# Patient Record
Sex: MALE | Race: WHITE | NOT HISPANIC OR LATINO | URBAN - METROPOLITAN AREA
[De-identification: names, ages, dates, MRNs, and addresses within clinical notes are randomized per-mention and may not be internally consistent; named-entity substitution may affect disease eponyms.]

---

## 2024-01-18 ENCOUNTER — INPATIENT (INPATIENT)
Facility: HOSPITAL | Age: 59
LOS: 12 days | Discharge: HOME | DRG: 163 | End: 2024-01-31
Attending: THORACIC SURGERY (CARDIOTHORACIC VASCULAR SURGERY) | Admitting: STUDENT IN AN ORGANIZED HEALTH CARE EDUCATION/TRAINING PROGRAM
Payer: COMMERCIAL

## 2024-01-18 VITALS
OXYGEN SATURATION: 96 % | HEIGHT: 71 IN | HEART RATE: 110 BPM | WEIGHT: 177.69 LBS | DIASTOLIC BLOOD PRESSURE: 83 MMHG | TEMPERATURE: 99 F | RESPIRATION RATE: 18 BRPM | SYSTOLIC BLOOD PRESSURE: 128 MMHG

## 2024-01-18 DIAGNOSIS — J90 PLEURAL EFFUSION, NOT ELSEWHERE CLASSIFIED: ICD-10-CM

## 2024-01-18 LAB
ALBUMIN SERPL ELPH-MCNC: 3.4 G/DL — SIGNIFICANT CHANGE UP (ref 3.3–5.2)
ALP SERPL-CCNC: 239 U/L — HIGH (ref 40–120)
ALT FLD-CCNC: 35 U/L — SIGNIFICANT CHANGE UP
ANION GAP SERPL CALC-SCNC: 23 MMOL/L — HIGH (ref 5–17)
APTT BLD: 33.1 SEC — SIGNIFICANT CHANGE UP (ref 24.5–35.6)
AST SERPL-CCNC: 17 U/L — SIGNIFICANT CHANGE UP
B PERT DNA SPEC QL NAA+PROBE: SIGNIFICANT CHANGE UP
BASE EXCESS BLDV CALC-SCNC: 0.6 MMOL/L — SIGNIFICANT CHANGE UP (ref -2–3)
BASOPHILS # BLD AUTO: 0.03 K/UL — SIGNIFICANT CHANGE UP (ref 0–0.2)
BASOPHILS NFR BLD AUTO: 0.3 % — SIGNIFICANT CHANGE UP (ref 0–2)
BILIRUB SERPL-MCNC: 0.3 MG/DL — LOW (ref 0.4–2)
BLD GP AB SCN SERPL QL: SIGNIFICANT CHANGE UP
BUN SERPL-MCNC: 12.1 MG/DL — SIGNIFICANT CHANGE UP (ref 8–20)
C PNEUM DNA SPEC QL NAA+PROBE: SIGNIFICANT CHANGE UP
CA-I SERPL-SCNC: 1.19 MMOL/L — SIGNIFICANT CHANGE UP (ref 1.15–1.33)
CALCIUM SERPL-MCNC: 9.6 MG/DL — SIGNIFICANT CHANGE UP (ref 8.4–10.5)
CHLORIDE BLDV-SCNC: 90 MMOL/L — LOW (ref 96–108)
CHLORIDE SERPL-SCNC: 84 MMOL/L — LOW (ref 96–108)
CO2 SERPL-SCNC: 22 MMOL/L — SIGNIFICANT CHANGE UP (ref 22–29)
CREAT SERPL-MCNC: 0.81 MG/DL — SIGNIFICANT CHANGE UP (ref 0.5–1.3)
EGFR: 102 ML/MIN/1.73M2 — SIGNIFICANT CHANGE UP
EOSINOPHIL # BLD AUTO: 0.04 K/UL — SIGNIFICANT CHANGE UP (ref 0–0.5)
EOSINOPHIL NFR BLD AUTO: 0.3 % — SIGNIFICANT CHANGE UP (ref 0–6)
FLUAV H1 2009 PAND RNA SPEC QL NAA+PROBE: SIGNIFICANT CHANGE UP
FLUAV H1 RNA SPEC QL NAA+PROBE: SIGNIFICANT CHANGE UP
FLUAV H3 RNA SPEC QL NAA+PROBE: SIGNIFICANT CHANGE UP
FLUAV SUBTYP SPEC NAA+PROBE: SIGNIFICANT CHANGE UP
FLUBV RNA SPEC QL NAA+PROBE: SIGNIFICANT CHANGE UP
GAS PNL BLDV: 128 MMOL/L — LOW (ref 136–145)
GAS PNL BLDV: SIGNIFICANT CHANGE UP
GLUCOSE BLDV-MCNC: 428 MG/DL — HIGH (ref 70–99)
GLUCOSE SERPL-MCNC: 360 MG/DL — HIGH (ref 70–99)
HADV DNA SPEC QL NAA+PROBE: SIGNIFICANT CHANGE UP
HCO3 BLDV-SCNC: 26 MMOL/L — SIGNIFICANT CHANGE UP (ref 22–29)
HCOV PNL SPEC NAA+PROBE: SIGNIFICANT CHANGE UP
HCT VFR BLD CALC: 45.1 % — SIGNIFICANT CHANGE UP (ref 39–50)
HCT VFR BLDA CALC: 48 % — SIGNIFICANT CHANGE UP
HGB BLD CALC-MCNC: 16 G/DL — SIGNIFICANT CHANGE UP (ref 12.6–17.4)
HGB BLD-MCNC: 15 G/DL — SIGNIFICANT CHANGE UP (ref 13–17)
HMPV RNA SPEC QL NAA+PROBE: SIGNIFICANT CHANGE UP
HPIV1 RNA SPEC QL NAA+PROBE: SIGNIFICANT CHANGE UP
HPIV2 RNA SPEC QL NAA+PROBE: SIGNIFICANT CHANGE UP
HPIV3 RNA SPEC QL NAA+PROBE: SIGNIFICANT CHANGE UP
HPIV4 RNA SPEC QL NAA+PROBE: SIGNIFICANT CHANGE UP
IMM GRANULOCYTES NFR BLD AUTO: 1 % — HIGH (ref 0–0.9)
INR BLD: 1.12 RATIO — SIGNIFICANT CHANGE UP (ref 0.85–1.18)
LACTATE BLDV-MCNC: 2.4 MMOL/L — HIGH (ref 0.5–2)
LIDOCAIN IGE QN: 30 U/L — SIGNIFICANT CHANGE UP (ref 22–51)
LYMPHOCYTES # BLD AUTO: 1.29 K/UL — SIGNIFICANT CHANGE UP (ref 1–3.3)
LYMPHOCYTES # BLD AUTO: 10.8 % — LOW (ref 13–44)
MAGNESIUM SERPL-MCNC: 1.6 MG/DL — SIGNIFICANT CHANGE UP (ref 1.6–2.6)
MCHC RBC-ENTMCNC: 30.6 PG — SIGNIFICANT CHANGE UP (ref 27–34)
MCHC RBC-ENTMCNC: 33.3 GM/DL — SIGNIFICANT CHANGE UP (ref 32–36)
MCV RBC AUTO: 92 FL — SIGNIFICANT CHANGE UP (ref 80–100)
MONOCYTES # BLD AUTO: 0.68 K/UL — SIGNIFICANT CHANGE UP (ref 0–0.9)
MONOCYTES NFR BLD AUTO: 5.7 % — SIGNIFICANT CHANGE UP (ref 2–14)
NEUTROPHILS # BLD AUTO: 9.83 K/UL — HIGH (ref 1.8–7.4)
NEUTROPHILS NFR BLD AUTO: 81.9 % — HIGH (ref 43–77)
NT-PROBNP SERPL-SCNC: 112 PG/ML — SIGNIFICANT CHANGE UP (ref 0–300)
PCO2 BLDV: 46 MMHG — SIGNIFICANT CHANGE UP (ref 42–55)
PH BLDV: 7.36 — SIGNIFICANT CHANGE UP (ref 7.32–7.43)
PLATELET # BLD AUTO: 498 K/UL — HIGH (ref 150–400)
PO2 BLDV: 47 MMHG — HIGH (ref 25–45)
POTASSIUM BLDV-SCNC: 5.4 MMOL/L — HIGH (ref 3.5–5.1)
POTASSIUM SERPL-MCNC: 5 MMOL/L — SIGNIFICANT CHANGE UP (ref 3.5–5.3)
POTASSIUM SERPL-SCNC: 5 MMOL/L — SIGNIFICANT CHANGE UP (ref 3.5–5.3)
PROT SERPL-MCNC: 8.5 G/DL — SIGNIFICANT CHANGE UP (ref 6.6–8.7)
PROTHROM AB SERPL-ACNC: 12.4 SEC — SIGNIFICANT CHANGE UP (ref 9.5–13)
RAPID RVP RESULT: DETECTED
RBC # BLD: 4.9 M/UL — SIGNIFICANT CHANGE UP (ref 4.2–5.8)
RBC # FLD: 11.7 % — SIGNIFICANT CHANGE UP (ref 10.3–14.5)
RV+EV RNA SPEC QL NAA+PROBE: SIGNIFICANT CHANGE UP
SAO2 % BLDV: 68.6 % — SIGNIFICANT CHANGE UP
SARS-COV-2 RNA SPEC QL NAA+PROBE: DETECTED
SODIUM SERPL-SCNC: 129 MMOL/L — LOW (ref 135–145)
TROPONIN T, HIGH SENSITIVITY RESULT: 13 NG/L — SIGNIFICANT CHANGE UP (ref 0–51)
TROPONIN T, HIGH SENSITIVITY RESULT: 22 NG/L — SIGNIFICANT CHANGE UP (ref 0–51)
WBC # BLD: 11.99 K/UL — HIGH (ref 3.8–10.5)
WBC # FLD AUTO: 11.99 K/UL — HIGH (ref 3.8–10.5)

## 2024-01-18 PROCEDURE — 99285 EMERGENCY DEPT VISIT HI MDM: CPT

## 2024-01-18 PROCEDURE — G1004: CPT

## 2024-01-18 PROCEDURE — 71046 X-RAY EXAM CHEST 2 VIEWS: CPT | Mod: 26

## 2024-01-18 PROCEDURE — 99223 1ST HOSP IP/OBS HIGH 75: CPT

## 2024-01-18 PROCEDURE — 71275 CT ANGIOGRAPHY CHEST: CPT | Mod: 26,ME

## 2024-01-18 RX ORDER — ACETAMINOPHEN 500 MG
1000 TABLET ORAL ONCE
Refills: 0 | Status: COMPLETED | OUTPATIENT
Start: 2024-01-18 | End: 2024-01-18

## 2024-01-18 RX ORDER — LANOLIN ALCOHOL/MO/W.PET/CERES
3 CREAM (GRAM) TOPICAL AT BEDTIME
Refills: 0 | Status: ACTIVE | OUTPATIENT
Start: 2024-01-18 | End: 2024-12-16

## 2024-01-18 RX ORDER — ACETAMINOPHEN 500 MG
650 TABLET ORAL EVERY 6 HOURS
Refills: 0 | Status: ACTIVE | OUTPATIENT
Start: 2024-01-18 | End: 2024-12-16

## 2024-01-18 RX ORDER — SODIUM CHLORIDE 9 MG/ML
1000 INJECTION INTRAMUSCULAR; INTRAVENOUS; SUBCUTANEOUS ONCE
Refills: 0 | Status: DISCONTINUED | OUTPATIENT
Start: 2024-01-18 | End: 2024-01-18

## 2024-01-18 RX ORDER — ONDANSETRON 8 MG/1
4 TABLET, FILM COATED ORAL EVERY 8 HOURS
Refills: 0 | Status: ACTIVE | OUTPATIENT
Start: 2024-01-18 | End: 2024-12-16

## 2024-01-18 RX ADMIN — Medication 400 MILLIGRAM(S): at 20:01

## 2024-01-18 NOTE — ED PROVIDER NOTE - NS ED ROS FT
General: Denies fever, chills  HEENT: Denies sore throat  Neck: Denies neck pain  Resp: cough, SOB  Cardiovascular: Denies palpitations, LE edema. chest pain  GI: Denies nausea, vomiting, abdominal pain, diarrhea, constipation, blood in stool  : Denies dysuria, hematuria  MSK: Denies back pain  Neuro: Denies HA, dizziness, numbness, weakness  Skin: Denies rashes.

## 2024-01-18 NOTE — H&P ADULT - TIME BILLING
chart, labs and imaging reviewed. Physical examination, medication reconciliation and documentation. Discussion with patient, ER nurse and CT surgery.

## 2024-01-18 NOTE — ED ADULT NURSE REASSESSMENT NOTE - NS ED NURSE REASSESS COMMENT FT1
Assumed care of patient at this time.  Pt is A&Ox4, Polish speaking, but able to make needs known.  Pt afebrile in NAD, resting on stretcher with family at bedside.  Pending imaging.  Safety maintained.

## 2024-01-18 NOTE — ED ADULT TRIAGE NOTE - CHIEF COMPLAINT QUOTE
pt c/o left side chest pain, started earlier today  A&Ox3 resp + KRUEGER, has pleural effusion , 3 nodes on the right lung

## 2024-01-18 NOTE — ED ADULT NURSE NOTE - NSFALLUNIVINTERV_ED_ALL_ED
Bed/Stretcher in lowest position, wheels locked, appropriate side rails in place/Call bell, personal items and telephone in reach/Instruct patient to call for assistance before getting out of bed/chair/stretcher/Non-slip footwear applied when patient is off stretcher/[place] to call system/Physically safe environment - no spills, clutter or unnecessary equipment/Purposeful proactive rounding/Room/bathroom lighting operational, light cord in reach

## 2024-01-18 NOTE — ED PROVIDER NOTE - PHYSICAL EXAMINATION
General: Awake, alert, lying in bed in NAD  HEENT: Normocephalic, atraumatic. No scleral icterus or conjunctival injection. EOMI. Moist mucous membranes. Oropharynx clear.   Neck:. Soft and supple.  Cardiac: RRR, Peripheral pulses 2+ and symmetric. No LE edema.  Resp: decreased breath sounds on the left. No accessory muscle use  Abd: Soft, non-tender, non-distended. No guarding, rebound, or rigidity.  Back: Spine midline and non-tender.   Skin: No rashes, abrasions, or lacerations.  Neuro: AO x 4. Moves all extremities symmetrically. Motor strength and sensation grossly intact.  Psych: Appropriate mood and affect

## 2024-01-18 NOTE — ED PROVIDER NOTE - CLINICAL SUMMARY MEDICAL DECISION MAKING FREE TEXT BOX
58y male w/ pmh of HTN, DM, active tobacco use sent in for abnormal chest CT results done today. Patient has been having left lower chest pain for the past 3 days. Pain radiates superiorly, is not exertional, is not pleuritic. He has also had a dry cough and mild SOB. denies fever, palpitations, abd pain, N/V/D, urinary symptoms, black or bloody stool, leg pain or swelling. CT scan don't today showed large left sided pleural effusion and right sided lung nodules. 58y male w/ pmh of HTN, DM, active tobacco use sent in for left chest pain and large pleural effusion seen on chest CT done earlier today. Patient well appearing on exam, nontoxic, normal work of breathing, decreased breath sounds on the left, stable vitals. 58y male w/ pmh of HTN, DM, active tobacco use sent in for left chest pain and large pleural effusion seen on chest CT done earlier today. Patient well appearing on exam, nontoxic, normal work of breathing, decreased breath sounds on the left, stable vitals. CT report from winsome garcia shows large loculated left sided pleural effusion, hilar changes, and pulmonary nodules and cavitations. Impression included infectious, neoplastic, or septic emboli sources. Will check labs, cultures, and CTA. Will likely need admission for thoracentesis and further testing

## 2024-01-18 NOTE — H&P ADULT - NSHPPHYSICALEXAM_GEN_ALL_CORE
Vital Signs Last 24 Hrs  T(C): 37 (18 Jan 2024 16:22), Max: 37 (18 Jan 2024 16:22)  T(F): 98.6 (18 Jan 2024 16:22), Max: 98.6 (18 Jan 2024 16:22)  HR: 110 (18 Jan 2024 16:22) (110 - 110)  BP: 128/83 (18 Jan 2024 16:22) (128/83 - 128/83)  BP(mean): --  RR: 18 (18 Jan 2024 16:22) (18 - 18)  SpO2: 96% (18 Jan 2024 16:22) (96% - 96%)    Parameters below as of 18 Jan 2024 16:22  Patient On (Oxygen Delivery Method): room air

## 2024-01-18 NOTE — ED PROVIDER NOTE - ATTENDING CONTRIBUTION TO CARE
I, Bayron Hatch, personally saw the patient with the resident, and completed the key components of the history and physical exam. I then discussed the management plan with the resident.    58-year-old male history of hypertension, diabetes, chronic smoker sent in by PMD for abnormal CT scan.  Patient had reported chest pain left  lower rib associated with shortness of breath for about 4 days.  Patient had x-ray that was done that showed pleural effusion.  Patient then had CT chest done at [facility] today.  report showed cavitary nodule with loculated left-sided pleural effusion and hilar adenopathy concern for infectious, neoplastic, or possible infected PE.  Patient had decreased lung sounds on the left, 94% on room air with no respiratory distress.  Family report born in [place] and had a BCG vaccine, and no prior history of TB.  No night sweats or fever.  Patient went on a cruise a few months ago into the [place].  No other travels.  As interpreted by ED physician, ECG is SR at 104 with  no ST/T changes. Will get blood work, blood culture, CTA chest to assess for PE.  Will need thoracentesis for pleural effusion pending on CT read.  Will need admission for additional workup.

## 2024-01-18 NOTE — ED ADULT NURSE NOTE - OBJECTIVE STATEMENT
Pt to ED c/o L sided cp, KRUEGER, & cough. Pt reportedly had a CT today that showed pleural effusion. Pt denies fever. Pt A&Ox4 in NAD @ this time. RR even & unlabored. Pt NSR on cardiac monitor.

## 2024-01-18 NOTE — H&P ADULT - ASSESSMENT
59 y/o male with PMH of HTN, DM-2, active tobacco use was sent to the ED for abnormal outpatient CT chest. Patient reported pain on his left side x 4-5days associated with dry cough and shortness of breath. Sent for CT chest which showed pleural effusion and right sided lung nodules. Patient reported decrease appetite and weight loss (unintentional). In the ED, CTA chest: 2 loculated pleural fluid collections on the left; mildly prominent bilateral hilar and mediastinal lymphadenopathy. 1cm irregular nodular consolidation either bronchiolar dilatation or cavitation likely infection vs cavitary neoplasm.     Loculated pleural effusion   Admit to medical floor with    CT chest as noted above   CT surgery consulted, plan for possible pigtail placement in AM   Oxygen therapy as needed   Pulmonology consulted     Irregular nodular consolidation   As noted on CT above   WBC: 11.99 with left shift   Infection vs cancerous given extensive cigarette use  Will start antibiotic in AM after pigtail   Pulmonology consulted     HTN   Lisinopril 10mg     Hyperglycemia with DM-2   Serum glucose: 360  Patient on Metformin ER 750mg daily will hold  HbA1C with AM lab   Insulin sliding scale     Hyponatremia   Na: 129  Corrected for glucose: 135  Monitor BMP     Supportive   DVT prophylaxis: Lovenox 40mg (to start after pigtail placement)     Plan of care discussed with patient, ER nurse and CT surgery.    57 y/o male with PMH of HTN, DM-2, active tobacco use was sent to the ED for abnormal outpatient CT chest. Patient reported pain on his left side x 4-5days associated with dry cough and shortness of breath. Sent for CT chest which showed pleural effusion and right sided lung nodules. Patient reported decrease appetite and weight loss (unintentional). In the ED, CTA chest: 2 loculated pleural fluid collections on the left; mildly prominent bilateral hilar and mediastinal lymphadenopathy. 1cm irregular nodular consolidation either bronchiolar dilatation or cavitation likely infection vs cavitary neoplasm.     Loculated pleural effusion   Admit to medical floor with    CT chest as noted above   CT surgery consulted, plan for possible pigtail placement in AM   Oxygen therapy as needed   Pulmonology consulted     Irregular nodular consolidation   As noted on CT above   WBC: 11.99 with left shift   Infection vs cancerous given extensive cigarette use  Will start antibiotic in AM after pigtail   Pulmonology consulted     HTN   Lisinopril 10mg     Hyperglycemia with DM-2   Serum glucose: 360  Patient on Metformin ER 750mg daily will hold  HbA1C with AM lab   Insulin sliding scale     Hyponatremia   Na: 129  Corrected for glucose: 135  Monitor BMP     COVID   Remdesivir as per protocol   Will hold off on Decadron as patient is not hypoxic   Isolation precaution     Nicotine dependence   Nicotine patch   Cessation advised     Supportive   DVT prophylaxis: Lovenox 40mg (to start after pigtail placement)     Plan of care discussed with patient, ER nurse and CT surgery.

## 2024-01-19 ENCOUNTER — RESULT REVIEW (OUTPATIENT)
Age: 59
End: 2024-01-19

## 2024-01-19 DIAGNOSIS — R05.9 COUGH, UNSPECIFIED: ICD-10-CM

## 2024-01-19 DIAGNOSIS — J90 PLEURAL EFFUSION, NOT ELSEWHERE CLASSIFIED: ICD-10-CM

## 2024-01-19 DIAGNOSIS — R91.1 SOLITARY PULMONARY NODULE: ICD-10-CM

## 2024-01-19 DIAGNOSIS — U07.1 COVID-19: ICD-10-CM

## 2024-01-19 DIAGNOSIS — R91.8 OTHER NONSPECIFIC ABNORMAL FINDING OF LUNG FIELD: ICD-10-CM

## 2024-01-19 DIAGNOSIS — R59.0 LOCALIZED ENLARGED LYMPH NODES: ICD-10-CM

## 2024-01-19 DIAGNOSIS — R06.02 SHORTNESS OF BREATH: ICD-10-CM

## 2024-01-19 DIAGNOSIS — F17.200 NICOTINE DEPENDENCE, UNSPECIFIED, UNCOMPLICATED: ICD-10-CM

## 2024-01-19 LAB
A1C WITH ESTIMATED AVERAGE GLUCOSE RESULT: 14.4 % — HIGH (ref 4–5.6)
ALBUMIN FLD-MCNC: 2.7 G/DL — SIGNIFICANT CHANGE UP
ANION GAP SERPL CALC-SCNC: 16 MMOL/L — SIGNIFICANT CHANGE UP (ref 5–17)
APPEARANCE UR: CLEAR — SIGNIFICANT CHANGE UP
B PERT IGG+IGM PNL SER: ABNORMAL
BACTERIA # UR AUTO: ABNORMAL /HPF
BILIRUB UR-MCNC: NEGATIVE — SIGNIFICANT CHANGE UP
BUN SERPL-MCNC: 10.5 MG/DL — SIGNIFICANT CHANGE UP (ref 8–20)
CALCIUM SERPL-MCNC: 8.9 MG/DL — SIGNIFICANT CHANGE UP (ref 8.4–10.5)
CAST: 2 /LPF — SIGNIFICANT CHANGE UP (ref 0–4)
CHLORIDE SERPL-SCNC: 89 MMOL/L — LOW (ref 96–108)
CO2 SERPL-SCNC: 24 MMOL/L — SIGNIFICANT CHANGE UP (ref 22–29)
COLOR FLD: YELLOW
COLOR SPEC: YELLOW — SIGNIFICANT CHANGE UP
CREAT SERPL-MCNC: 0.71 MG/DL — SIGNIFICANT CHANGE UP (ref 0.5–1.3)
DIFF PNL FLD: ABNORMAL
EGFR: 106 ML/MIN/1.73M2 — SIGNIFICANT CHANGE UP
EOSINOPHIL # FLD: 2 % — SIGNIFICANT CHANGE UP
ESTIMATED AVERAGE GLUCOSE: 367 MG/DL — HIGH (ref 68–114)
FLUID INTAKE SUBSTANCE CLASS: SIGNIFICANT CHANGE UP
GLUCOSE BLDC GLUCOMTR-MCNC: 306 MG/DL — HIGH (ref 70–99)
GLUCOSE BLDC GLUCOMTR-MCNC: 347 MG/DL — HIGH (ref 70–99)
GLUCOSE BLDC GLUCOMTR-MCNC: 366 MG/DL — HIGH (ref 70–99)
GLUCOSE BLDC GLUCOMTR-MCNC: 394 MG/DL — HIGH (ref 70–99)
GLUCOSE FLD-MCNC: 40 MG/DL — SIGNIFICANT CHANGE UP
GLUCOSE SERPL-MCNC: 314 MG/DL — HIGH (ref 70–99)
GLUCOSE UR QL: >=1000 MG/DL
HCT VFR BLD CALC: 40 % — SIGNIFICANT CHANGE UP (ref 39–50)
HCV AB S/CO SERPL IA: 0.2 S/CO — SIGNIFICANT CHANGE UP (ref 0–0.99)
HCV AB SERPL-IMP: SIGNIFICANT CHANGE UP
HGB BLD-MCNC: 13.4 G/DL — SIGNIFICANT CHANGE UP (ref 13–17)
KETONES UR-MCNC: 80 MG/DL
LEUKOCYTE ESTERASE UR-ACNC: ABNORMAL
LYMPHOCYTES # FLD: 54 % — SIGNIFICANT CHANGE UP
MCHC RBC-ENTMCNC: 31.1 PG — SIGNIFICANT CHANGE UP (ref 27–34)
MCHC RBC-ENTMCNC: 33.5 GM/DL — SIGNIFICANT CHANGE UP (ref 32–36)
MCV RBC AUTO: 92.8 FL — SIGNIFICANT CHANGE UP (ref 80–100)
MONOS+MACROS # FLD: 11 % — SIGNIFICANT CHANGE UP
NEUTROPHILS-BODY FLUID: 33 % — SIGNIFICANT CHANGE UP
NITRITE UR-MCNC: NEGATIVE — SIGNIFICANT CHANGE UP
OSMOLALITY UR: 384 MOSM/KG — SIGNIFICANT CHANGE UP (ref 300–1000)
PH FLD: 6 — SIGNIFICANT CHANGE UP
PH UR: 5.5 — SIGNIFICANT CHANGE UP (ref 5–8)
PLATELET # BLD AUTO: 407 K/UL — HIGH (ref 150–400)
POTASSIUM SERPL-MCNC: 4.5 MMOL/L — SIGNIFICANT CHANGE UP (ref 3.5–5.3)
POTASSIUM SERPL-SCNC: 4.5 MMOL/L — SIGNIFICANT CHANGE UP (ref 3.5–5.3)
PROT FLD-MCNC: 5.6 G/DL — SIGNIFICANT CHANGE UP
PROT UR-MCNC: NEGATIVE MG/DL — SIGNIFICANT CHANGE UP
RBC # BLD: 4.31 M/UL — SIGNIFICANT CHANGE UP (ref 4.2–5.8)
RBC # FLD: 11.7 % — SIGNIFICANT CHANGE UP (ref 10.3–14.5)
RBC CASTS # UR COMP ASSIST: 1 /HPF — SIGNIFICANT CHANGE UP (ref 0–4)
RCV VOL RI: HIGH /UL (ref 0–0)
SODIUM SERPL-SCNC: 129 MMOL/L — LOW (ref 135–145)
SODIUM UR-SCNC: 38 MMOL/L — SIGNIFICANT CHANGE UP
SP GR SPEC: 1.02 — SIGNIFICANT CHANGE UP (ref 1–1.03)
SQUAMOUS # UR AUTO: 4 /HPF — SIGNIFICANT CHANGE UP (ref 0–5)
TOTAL NUCLEATED CELL COUNT, BODY FLUID: SIGNIFICANT CHANGE UP /UL
TUBE TYPE: SIGNIFICANT CHANGE UP
UROBILINOGEN FLD QL: 0.2 MG/DL — SIGNIFICANT CHANGE UP (ref 0.2–1)
WBC # BLD: 11.71 K/UL — HIGH (ref 3.8–10.5)
WBC # FLD AUTO: 11.71 K/UL — HIGH (ref 3.8–10.5)
WBC UR QL: 30 /HPF — HIGH (ref 0–5)
YEAST-LIKE CELLS: PRESENT

## 2024-01-19 PROCEDURE — 99233 SBSQ HOSP IP/OBS HIGH 50: CPT

## 2024-01-19 PROCEDURE — 88305 TISSUE EXAM BY PATHOLOGIST: CPT | Mod: 26

## 2024-01-19 PROCEDURE — 99221 1ST HOSP IP/OBS SF/LOW 40: CPT

## 2024-01-19 PROCEDURE — 88112 CYTOPATH CELL ENHANCE TECH: CPT | Mod: 26

## 2024-01-19 PROCEDURE — 99222 1ST HOSP IP/OBS MODERATE 55: CPT

## 2024-01-19 PROCEDURE — 71045 X-RAY EXAM CHEST 1 VIEW: CPT | Mod: 26

## 2024-01-19 RX ORDER — NICOTINE POLACRILEX 2 MG
1 GUM BUCCAL DAILY
Refills: 0 | Status: ACTIVE | OUTPATIENT
Start: 2024-01-19 | End: 2024-12-17

## 2024-01-19 RX ORDER — ENOXAPARIN SODIUM 100 MG/ML
40 INJECTION SUBCUTANEOUS EVERY 24 HOURS
Refills: 0 | Status: COMPLETED | OUTPATIENT
Start: 2024-01-19 | End: 2024-01-24

## 2024-01-19 RX ORDER — INSULIN LISPRO 100/ML
VIAL (ML) SUBCUTANEOUS
Refills: 0 | Status: DISCONTINUED | OUTPATIENT
Start: 2024-01-19 | End: 2024-01-23

## 2024-01-19 RX ORDER — REMDESIVIR 5 MG/ML
100 INJECTION INTRAVENOUS EVERY 24 HOURS
Refills: 0 | Status: COMPLETED | OUTPATIENT
Start: 2024-01-20 | End: 2024-01-23

## 2024-01-19 RX ORDER — PIPERACILLIN AND TAZOBACTAM 4; .5 G/20ML; G/20ML
3.38 INJECTION, POWDER, LYOPHILIZED, FOR SOLUTION INTRAVENOUS EVERY 8 HOURS
Refills: 0 | Status: DISCONTINUED | OUTPATIENT
Start: 2024-01-19 | End: 2024-01-24

## 2024-01-19 RX ORDER — DEXTROSE 50 % IN WATER 50 %
25 SYRINGE (ML) INTRAVENOUS ONCE
Refills: 0 | Status: ACTIVE | OUTPATIENT
Start: 2024-01-19

## 2024-01-19 RX ORDER — GLUCAGON INJECTION, SOLUTION 0.5 MG/.1ML
1 INJECTION, SOLUTION SUBCUTANEOUS ONCE
Refills: 0 | Status: ACTIVE | OUTPATIENT
Start: 2024-01-19 | End: 2024-12-17

## 2024-01-19 RX ORDER — DEXTROSE 50 % IN WATER 50 %
12.5 SYRINGE (ML) INTRAVENOUS ONCE
Refills: 0 | Status: ACTIVE | OUTPATIENT
Start: 2024-01-19

## 2024-01-19 RX ORDER — SODIUM CHLORIDE 9 MG/ML
1000 INJECTION, SOLUTION INTRAVENOUS
Refills: 0 | Status: ACTIVE | OUTPATIENT
Start: 2024-01-19 | End: 2024-12-17

## 2024-01-19 RX ORDER — INSULIN LISPRO 100/ML
5 VIAL (ML) SUBCUTANEOUS
Refills: 0 | Status: DISCONTINUED | OUTPATIENT
Start: 2024-01-19 | End: 2024-01-20

## 2024-01-19 RX ORDER — INSULIN LISPRO 100/ML
VIAL (ML) SUBCUTANEOUS AT BEDTIME
Refills: 0 | Status: DISCONTINUED | OUTPATIENT
Start: 2024-01-19 | End: 2024-01-23

## 2024-01-19 RX ORDER — DEXTROSE 50 % IN WATER 50 %
15 SYRINGE (ML) INTRAVENOUS ONCE
Refills: 0 | Status: ACTIVE | OUTPATIENT
Start: 2024-01-19 | End: 2024-12-17

## 2024-01-19 RX ORDER — REMDESIVIR 5 MG/ML
INJECTION INTRAVENOUS
Refills: 0 | Status: COMPLETED | OUTPATIENT
Start: 2024-01-19 | End: 2024-01-23

## 2024-01-19 RX ORDER — INSULIN GLARGINE 100 [IU]/ML
15 INJECTION, SOLUTION SUBCUTANEOUS AT BEDTIME
Refills: 0 | Status: DISCONTINUED | OUTPATIENT
Start: 2024-01-19 | End: 2024-01-20

## 2024-01-19 RX ORDER — REMDESIVIR 5 MG/ML
200 INJECTION INTRAVENOUS EVERY 24 HOURS
Refills: 0 | Status: COMPLETED | OUTPATIENT
Start: 2024-01-19 | End: 2024-01-19

## 2024-01-19 RX ORDER — LISINOPRIL 2.5 MG/1
10 TABLET ORAL DAILY
Refills: 0 | Status: ACTIVE | OUTPATIENT
Start: 2024-01-19 | End: 2024-12-17

## 2024-01-19 RX ADMIN — INSULIN GLARGINE 15 UNIT(S): 100 INJECTION, SOLUTION SUBCUTANEOUS at 22:40

## 2024-01-19 RX ADMIN — Medication 5: at 12:55

## 2024-01-19 RX ADMIN — ENOXAPARIN SODIUM 40 MILLIGRAM(S): 100 INJECTION SUBCUTANEOUS at 22:40

## 2024-01-19 RX ADMIN — REMDESIVIR 200 MILLIGRAM(S): 5 INJECTION INTRAVENOUS at 05:53

## 2024-01-19 RX ADMIN — LISINOPRIL 10 MILLIGRAM(S): 2.5 TABLET ORAL at 06:04

## 2024-01-19 RX ADMIN — Medication 2: at 22:40

## 2024-01-19 RX ADMIN — Medication 5 UNIT(S): at 12:56

## 2024-01-19 RX ADMIN — PIPERACILLIN AND TAZOBACTAM 25 GRAM(S): 4; .5 INJECTION, POWDER, LYOPHILIZED, FOR SOLUTION INTRAVENOUS at 17:18

## 2024-01-19 RX ADMIN — Medication 5 UNIT(S): at 17:19

## 2024-01-19 RX ADMIN — Medication 650 MILLIGRAM(S): at 22:00

## 2024-01-19 RX ADMIN — Medication 4: at 08:40

## 2024-01-19 RX ADMIN — Medication 650 MILLIGRAM(S): at 22:54

## 2024-01-19 RX ADMIN — Medication 5: at 17:18

## 2024-01-19 RX ADMIN — PIPERACILLIN AND TAZOBACTAM 25 GRAM(S): 4; .5 INJECTION, POWDER, LYOPHILIZED, FOR SOLUTION INTRAVENOUS at 05:53

## 2024-01-19 NOTE — CONSULT NOTE ADULT - ASSESSMENT
ASSESSMENT:   58 year old male with a PMHx of HTN, HLD, type 2 DM (on oral agents), active tobacco smoker (3/4PPD), with complaint of recent unintentional weight loss, dry cough, SOB, left sided pleuritic pain X 4-5 days, found with a loculated left sided pleural effusion, bilateral hilar and mediastinal lymphadenopathy, and a 1cm RLL irregular nodular consolidation with central round gas lucency on CTA chest.  Thoracic Surgery called for consult.   Patient also with RVP +COVID-19.     PLAN:  Admitted to Medicine.  Remdesivir ordered for COVID-19 infection per primary team.   Continuous SpO2 monitoring.  Patient currently stable with SpO2 >92% on room air while sitting/talking.  Maintain SpO2 >92%. Supplement with O2 therapy PRN.   POCUS to be performed at bedside later today to further evaluate the Left sided pleural effusion / evaluation for a chest tube.   Will consider placing a chest tube sooner if patient experiences increased work of breathing, SpO2 starts to trend down, O2 requirements start to trend up.   Further plan as per primary team.   Will continue to follow along.  Plan to be discussed / reviewed with Thoracic Surgery attending / team during AM rounds.

## 2024-01-19 NOTE — PROGRESS NOTE ADULT - SUBJECTIVE AND OBJECTIVE BOX
Patient is a 58y old  Male admitted for abnormal CT of chest , CP     He is seen in am   resting in the bed   no respiratory distress , denies any pain             ALLERGIES:  No Known Allergies    MEDICATIONS  (STANDING):  dextrose 5%. 1000 milliLiter(s) (100 mL/Hr) IV Continuous <Continuous>  dextrose 5%. 1000 milliLiter(s) (50 mL/Hr) IV Continuous <Continuous>  dextrose 50% Injectable 25 Gram(s) IV Push once  dextrose 50% Injectable 12.5 Gram(s) IV Push once  dextrose 50% Injectable 25 Gram(s) IV Push once  enoxaparin Injectable 40 milliGRAM(s) SubCutaneous every 24 hours  glucagon  Injectable 1 milliGRAM(s) IntraMuscular once  insulin glargine Injectable (LANTUS) 15 Unit(s) SubCutaneous at bedtime  insulin lispro (ADMELOG) corrective regimen sliding scale   SubCutaneous three times a day before meals  insulin lispro (ADMELOG) corrective regimen sliding scale   SubCutaneous at bedtime  insulin lispro Injectable (ADMELOG) 5 Unit(s) SubCutaneous three times a day before meals  lisinopril 10 milliGRAM(s) Oral daily  nicotine -  14 mG/24Hr(s) Patch 1 Patch Transdermal daily  piperacillin/tazobactam IVPB.. 3.375 Gram(s) IV Intermittent every 8 hours  remdesivir  IVPB   IV Intermittent     MEDICATIONS  (PRN):  acetaminophen     Tablet .. 650 milliGRAM(s) Oral every 6 hours PRN Temp greater or equal to 38C (100.4F), Mild Pain (1 - 3)  aluminum hydroxide/magnesium hydroxide/simethicone Suspension 30 milliLiter(s) Oral every 4 hours PRN Dyspepsia  benzonatate 100 milliGRAM(s) Oral three times a day PRN Cough  dextrose Oral Gel 15 Gram(s) Oral once PRN Blood Glucose LESS THAN 70 milliGRAM(s)/deciliter  melatonin 3 milliGRAM(s) Oral at bedtime PRN Insomnia  ondansetron Injectable 4 milliGRAM(s) IV Push every 8 hours PRN Nausea and/or Vomiting    Vital Signs Last 24 Hrs  T(F): 97.5 (2024 11:23), Max: 98.8 (2024 05:52)  HR: 89 (2024 11:23) (87 - 110)  BP: 116/76 (2024 11:23) (116/75 - 128/83)  RR: 18 (2024 11:23) (18 - 20)  SpO2: 92% (2024 11:23) (92% - 96%)  I&O's Summary      PHYSICAL EXAM:  General: awake alert   Neck: supple , no JVD   Lungs: diminished BS on the left   Cardio: RRR, S1/S2, No murmur  Abdomen: Soft, Nontender, Nondistended; Bowel sounds present  Extremities: No calf tenderness, No pitting edema    LABS:                        13.4   11.71 )-----------( 407      ( 2024 02:37 )             40.0         129  |  89  |  10.5  ----------------------------<  314  4.5   |  24.0  |  0.71    Ca    8.9      2024 02:37  Mg     1.6         TPro  8.5  /  Alb  3.4  /  TBili  0.3  /  DBili  x   /  AST  17  /  ALT  35  /  AlkPhos  239        Lipase: 30 U/L (24 @ 18:15)      PT/INR - ( 2024 18:15 )   PT: 12.4 sec;   INR: 1.12 ratio         PTT - ( 2024 18:15 )  PTT:33.1 sec              18:15 - VBG - pH: 7.360 | pCO2: 46    | pO2: 47    | Lactate: 2.40             POCT Blood Glucose.: 306 mg/dL (2024 08:39)      Urinalysis Basic - ( 2024 07:50 )    Color: Yellow / Appearance: Clear / S.021 / pH: x  Gluc: x / Ketone: 80 mg/dL  / Bili: Negative / Urobili: 0.2 mg/dL   Blood: x / Protein: Negative mg/dL / Nitrite: Negative   Leuk Esterase: Small / RBC: 1 /HPF / WBC 30 /HPF   Sq Epi: x / Non Sq Epi: 4 /HPF / Bacteria: Few /HPF          RADIOLOGY & ADDITIONAL TESTS:       Patient is a 58y old  Male admitted for abnormal CT of chest , CP     He is seen in am   resting in the bed   no respiratory distress , denies any pain             ALLERGIES:  No Known Allergies    MEDICATIONS  (STANDING):  dextrose 5%. 1000 milliLiter(s) (100 mL/Hr) IV Continuous <Continuous>  dextrose 5%. 1000 milliLiter(s) (50 mL/Hr) IV Continuous <Continuous>  dextrose 50% Injectable 25 Gram(s) IV Push once  dextrose 50% Injectable 12.5 Gram(s) IV Push once  dextrose 50% Injectable 25 Gram(s) IV Push once  enoxaparin Injectable 40 milliGRAM(s) SubCutaneous every 24 hours  glucagon  Injectable 1 milliGRAM(s) IntraMuscular once  insulin glargine Injectable (LANTUS) 15 Unit(s) SubCutaneous at bedtime  insulin lispro (ADMELOG) corrective regimen sliding scale   SubCutaneous three times a day before meals  insulin lispro (ADMELOG) corrective regimen sliding scale   SubCutaneous at bedtime  insulin lispro Injectable (ADMELOG) 5 Unit(s) SubCutaneous three times a day before meals  lisinopril 10 milliGRAM(s) Oral daily  nicotine -  14 mG/24Hr(s) Patch 1 Patch Transdermal daily  piperacillin/tazobactam IVPB.. 3.375 Gram(s) IV Intermittent every 8 hours  remdesivir  IVPB   IV Intermittent     MEDICATIONS  (PRN):  acetaminophen     Tablet .. 650 milliGRAM(s) Oral every 6 hours PRN Temp greater or equal to 38C (100.4F), Mild Pain (1 - 3)  aluminum hydroxide/magnesium hydroxide/simethicone Suspension 30 milliLiter(s) Oral every 4 hours PRN Dyspepsia  benzonatate 100 milliGRAM(s) Oral three times a day PRN Cough  dextrose Oral Gel 15 Gram(s) Oral once PRN Blood Glucose LESS THAN 70 milliGRAM(s)/deciliter  melatonin 3 milliGRAM(s) Oral at bedtime PRN Insomnia  ondansetron Injectable 4 milliGRAM(s) IV Push every 8 hours PRN Nausea and/or Vomiting    Vital Signs Last 24 Hrs  T(F): 97.5 (2024 11:23), Max: 98.8 (2024 05:52)  HR: 89 (2024 11:23) (87 - 110)  BP: 116/76 (2024 11:23) (116/75 - 128/83)  RR: 18 (2024 11:23) (18 - 20)  SpO2: 92% (2024 11:23) (92% - 96%)  I&O's Summary      PHYSICAL EXAM:  General: awake alert   Neck: supple , no JVD   Lungs: diminished BS on the left   Cardio: RRR, S1/S2, No murmur  Abdomen: Soft, Nontender, Nondistended; Bowel sounds present  Extremities: No calf tenderness, No pitting edema    LABS:                        13.4   11.71 )-----------( 407      ( 2024 02:37 )             40.0         129  |  89  |  10.5  ----------------------------<  314  4.5   |  24.0  |  0.71    Ca    8.9      2024 02:37  Mg     1.6         TPro  8.5  /  Alb  3.4  /  TBili  0.3  /  DBili  x   /  AST  17  /  ALT  35  /  AlkPhos  239        Lipase: 30 U/L (24 @ 18:15)      PT/INR - ( 2024 18:15 )   PT: 12.4 sec;   INR: 1.12 ratio         PTT - ( 2024 18:15 )  PTT:33.1 sec              18:15 - VBG - pH: 7.360 | pCO2: 46    | pO2: 47    | Lactate: 2.40             POCT Blood Glucose.: 306 mg/dL (2024 08:39)      Urinalysis Basic - ( 2024 07:50 )    Color: Yellow / Appearance: Clear / S.021 / pH: x  Gluc: x / Ketone: 80 mg/dL  / Bili: Negative / Urobili: 0.2 mg/dL   Blood: x / Protein: Negative mg/dL / Nitrite: Negative   Leuk Esterase: Small / RBC: 1 /HPF / WBC 30 /HPF   Sq Epi: x / Non Sq Epi: 4 /HPF / Bacteria: Few /HPF          RADIOLOGY & ADDITIONAL TESTS:      ccc< from: CT Angio Chest PE Protocol w/ IV Cont (24 @ 21:07) >  IMPRESSION: No pulmonary embolus is noted.    Two nodules demonstrating central lucency/cavitation are noted in the   right upper and right lower lobes. Exact etiology is unclear.    Small to moderate-sized loculated left pleural effusion.    --- End of Report ---            < end of copied text >

## 2024-01-19 NOTE — ED ADULT NURSE REASSESSMENT NOTE - NS ED NURSE REASSESS COMMENT FT1
Patient admitted to medicine, report given to ED holding, awaiting room placement.  Pt free from respiratory distress with no adverse effects noted from medication.

## 2024-01-19 NOTE — CONSULT NOTE ADULT - ASSESSMENT
-Loculated pl effusion with nodules; r/o infectious vs malignant  -Smoker, ?copd  -Cough  -COVID +  -Possible asbestos exposure    RECC:  Agree with remdesivir, abx. ID eval. T-surg for POCUS and assess feasibility of thoracentesis. Sputum culture. Nebs prn. Titrate O2. Smoking cessation a must.     D/W dtr on phone.  -Loculated pl effusion with nodules; r/o infectious vs malignant  -Smoker, ?copd  -Cough  -COVID +  -Possible asbestos exposure    RECC:  Agree with remdesivir, abx. ID eval. T-surg for POCUS and assess feasibility of thoracentesis. If unable to drain fluid, will have to follow radiographically. With regards to nodules, if we are unable to get fluid and cyto, will have to follow closely after abx. May need PET and/or bx procedure as outpt. Sputum culture. Nebs prn. Titrate O2. Smoking cessation a must.     D/W dtr on phone.

## 2024-01-19 NOTE — PROGRESS NOTE ADULT - ASSESSMENT
59 y/o male with PMH of HTN, DM-2, active tobacco use was sent to the ED for abnormal outpatient CT chest. Patient reported pain on his left side x 4-5days associated with dry cough and shortness of breath. Sent for CT chest which showed pleural effusion and right sided lung nodules. Patient reported decrease appetite and weight loss (unintentional). In the ED, CTA chest: 2 loculated pleural fluid collections on the left; mildly prominent bilateral hilar and mediastinal lymphadenopathy. 1cm irregular nodular consolidation either bronchiolar dilatation or cavitation likely infection vs cavitary neoplasm.     1-Loculated pleural effusion   CT chest as noted above   CT surgery consulted, plan for possible pigtail placement    , tele   pulmonary input appreciated       2-Irregular nodular consolidation   WBC: 11.99 with left shift   start empirical iv abx   TS on board for thoracentesis fluid studies . cytology   pulm input noted     3-HTN   Lisinopril 10mg     4-Hyperglycemia with DM-2   add lantus , admelog premeals   ISS   diabetic diet     5- Hypomagnesemia   replace IV mg   add daily mg supplement     d/w pt and nurse    57 y/o male with PMH of HTN, DM-2, active tobacco use was sent to the ED for abnormal outpatient CT chest. Patient reported pain on his left side x 4-5days associated with dry cough and shortness of breath. Sent for CT chest which showed pleural effusion and right sided lung nodules. Patient reported decrease appetite and weight loss (unintentional). In the ED, CTA chest: 2 loculated pleural fluid collections on the left; mildly prominent bilateral hilar and mediastinal lymphadenopathy. 1cm irregular nodular consolidation either bronchiolar dilatation or cavitation likely infection vs cavitary neoplasm.     1-Loculated pleural effusion   CT chest as noted above   CT surgery consulted, plan for possible pigtail placement    , tele   pulmonary input appreciated       2-Irregular nodular consolidation   WBC: 11.99 with left shift   cont iv zosyn ;empirical iv abx   TS on board for thoracentesis fluid studies . cytology   pulm input noted     3-HTN   Lisinopril 10mg     4-Hyperglycemia with DM-2   add lantus , admelog premeals   ISS   diabetic diet     5- Hypomagnesemia / hyponatremia   replace IV mg   add daily mg supplement   na levels daily   check osmalality , urine lytes     6- COvid 19 infection   no hypoxia   remdesevir   if hypoxic will start decadron   supportive treatment   add vit c , vit d , trend lfts , markers           d/w pt and nurse   DVt prophyalxis   acute due to infection , lung opacites and effusion

## 2024-01-19 NOTE — ED ADULT NURSE REASSESSMENT NOTE - NS ED NURSE REASSESS COMMENT FT1
Patient A&Ox4, on cardiac monitor/ with no events reported.  Pt admitted with COVID-19, pending CT surgery follow up.  Safety maintained with bed locked, in lowest position.

## 2024-01-20 DIAGNOSIS — J90 PLEURAL EFFUSION, NOT ELSEWHERE CLASSIFIED: ICD-10-CM

## 2024-01-20 DIAGNOSIS — J86.9 PYOTHORAX WITHOUT FISTULA: ICD-10-CM

## 2024-01-20 LAB
CULTURE RESULTS: SIGNIFICANT CHANGE UP
GLUCOSE BLDC GLUCOMTR-MCNC: 303 MG/DL — HIGH (ref 70–99)
GLUCOSE BLDC GLUCOMTR-MCNC: 339 MG/DL — HIGH (ref 70–99)
GLUCOSE BLDC GLUCOMTR-MCNC: 392 MG/DL — HIGH (ref 70–99)
GLUCOSE BLDC GLUCOMTR-MCNC: 473 MG/DL — CRITICAL HIGH (ref 70–99)
GRAM STN FLD: ABNORMAL
LDH SERPL L TO P-CCNC: 127 U/L — SIGNIFICANT CHANGE UP (ref 98–192)
LDH SERPL L TO P-CCNC: 7304 U/L — SIGNIFICANT CHANGE UP
PROT SERPL-MCNC: 6.9 G/DL — SIGNIFICANT CHANGE UP (ref 6.6–8.7)
SPECIMEN SOURCE: SIGNIFICANT CHANGE UP
SPECIMEN SOURCE: SIGNIFICANT CHANGE UP

## 2024-01-20 PROCEDURE — 99232 SBSQ HOSP IP/OBS MODERATE 35: CPT

## 2024-01-20 PROCEDURE — 99231 SBSQ HOSP IP/OBS SF/LOW 25: CPT

## 2024-01-20 PROCEDURE — 99233 SBSQ HOSP IP/OBS HIGH 50: CPT

## 2024-01-20 PROCEDURE — 71045 X-RAY EXAM CHEST 1 VIEW: CPT | Mod: 26

## 2024-01-20 RX ORDER — KETOROLAC TROMETHAMINE 30 MG/ML
15 SYRINGE (ML) INJECTION ONCE
Refills: 0 | Status: DISCONTINUED | OUTPATIENT
Start: 2024-01-20 | End: 2024-01-20

## 2024-01-20 RX ORDER — VANCOMYCIN HCL 1 G
1000 VIAL (EA) INTRAVENOUS ONCE
Refills: 0 | Status: COMPLETED | OUTPATIENT
Start: 2024-01-20 | End: 2024-01-20

## 2024-01-20 RX ORDER — INSULIN LISPRO 100/ML
8 VIAL (ML) SUBCUTANEOUS
Refills: 0 | Status: DISCONTINUED | OUTPATIENT
Start: 2024-01-20 | End: 2024-01-21

## 2024-01-20 RX ORDER — INSULIN GLARGINE 100 [IU]/ML
20 INJECTION, SOLUTION SUBCUTANEOUS AT BEDTIME
Refills: 0 | Status: DISCONTINUED | OUTPATIENT
Start: 2024-01-20 | End: 2024-01-21

## 2024-01-20 RX ADMIN — PIPERACILLIN AND TAZOBACTAM 25 GRAM(S): 4; .5 INJECTION, POWDER, LYOPHILIZED, FOR SOLUTION INTRAVENOUS at 01:08

## 2024-01-20 RX ADMIN — Medication 250 MILLIGRAM(S): at 03:40

## 2024-01-20 RX ADMIN — LISINOPRIL 10 MILLIGRAM(S): 2.5 TABLET ORAL at 05:27

## 2024-01-20 RX ADMIN — Medication 8 UNIT(S): at 12:24

## 2024-01-20 RX ADMIN — PIPERACILLIN AND TAZOBACTAM 25 GRAM(S): 4; .5 INJECTION, POWDER, LYOPHILIZED, FOR SOLUTION INTRAVENOUS at 08:49

## 2024-01-20 RX ADMIN — Medication 2: at 23:43

## 2024-01-20 RX ADMIN — Medication 4: at 17:40

## 2024-01-20 RX ADMIN — Medication 15 MILLIGRAM(S): at 01:30

## 2024-01-20 RX ADMIN — REMDESIVIR 200 MILLIGRAM(S): 5 INJECTION INTRAVENOUS at 05:25

## 2024-01-20 RX ADMIN — PIPERACILLIN AND TAZOBACTAM 25 GRAM(S): 4; .5 INJECTION, POWDER, LYOPHILIZED, FOR SOLUTION INTRAVENOUS at 17:42

## 2024-01-20 RX ADMIN — Medication 5: at 08:50

## 2024-01-20 RX ADMIN — Medication 650 MILLIGRAM(S): at 20:15

## 2024-01-20 RX ADMIN — Medication 650 MILLIGRAM(S): at 21:15

## 2024-01-20 RX ADMIN — Medication 8 UNIT(S): at 17:40

## 2024-01-20 RX ADMIN — INSULIN GLARGINE 20 UNIT(S): 100 INJECTION, SOLUTION SUBCUTANEOUS at 23:51

## 2024-01-20 RX ADMIN — ENOXAPARIN SODIUM 40 MILLIGRAM(S): 100 INJECTION SUBCUTANEOUS at 23:44

## 2024-01-20 RX ADMIN — Medication 6: at 12:24

## 2024-01-20 RX ADMIN — Medication 15 MILLIGRAM(S): at 01:08

## 2024-01-20 RX ADMIN — Medication 1 PATCH: at 11:38

## 2024-01-20 RX ADMIN — Medication 5 UNIT(S): at 08:55

## 2024-01-20 NOTE — PROGRESS NOTE ADULT - ASSESSMENT
57 y/o male with PMH of HTN, DM-2, active tobacco use was sent to the ED for abnormal outpatient CT chest. Patient reported pain on his left side x 4-5days associated with dry cough and shortness of breath. Sent for CT chest which showed pleural effusion and right sided lung nodules. Patient reported decrease appetite and weight loss (unintentional). In the ED, CTA chest: 2 loculated pleural fluid collections on the left; mildly prominent bilateral hilar and mediastinal lymphadenopathy. 1cm irregular nodular consolidation either bronchiolar dilatation or cavitation likely infection vs cavitary neoplasm.     1-Loculated pleural effusion   CT chest as noted above   CT surgery consulted, plan for possible pigtail placement    , tele   pulmonary input appreciated       2-Irregular nodular consolidation   WBC: 11.99 with left shift   cont iv zosyn ;empirical iv abx   TS on board for thoracentesis fluid studies . cytology   pulm input noted     3-HTN   Lisinopril 10mg     4-Hyperglycemia with DM-2   add lantus , admelog premeals   ISS   diabetic diet     5- Hypomagnesemia / hyponatremia   replace IV mg   add daily mg supplement   na levels daily   check osmalality , urine lytes     6- COvid 19 infection   no hypoxia   remdesevir   if hypoxic will start decadron   supportive treatment   add vit c , vit d , trend lfts , markers           d/w pt and nurse   DVt prophyalxis   acute due to infection , lung opacites and effusion  59 y/o male with PMH of HTN, DM-2, active tobacco use was sent to the ED for abnormal outpatient CT chest. Patient reported pain on his left side x 4-5days associated with dry cough and shortness of breath. Sent for CT chest which showed pleural effusion and right sided lung nodules. Patient reported decrease appetite and weight loss (unintentional). In the ED, CTA chest: 2 loculated pleural fluid collections on the left; mildly prominent bilateral hilar and mediastinal lymphadenopathy. 1cm irregular nodular consolidation either bronchiolar dilatation or cavitation likely infection vs cavitary neoplasm.     #Loculated pleural effusion   s/p chest tube 1/19  CT chest as noted above   F/u CTS recs   , tele   F/u Pulm recs    #Irregular nodular consolidation   WBC: 11.99 with left shift   cont iv zosyn ;empirical iv abx   TS on board for thoracentesis fluid studies . cytology   pulm input noted     #HTN   Lisinopril 10mg     #Hyperglycemia with DM-2   A1c >14  Lantus increased to 20u qhs  Admelog increased to 8u qac  ISS, FSG qac/qhs  diabetic diet   Pt counseled on diet/lifestyle modifications     #Hypomagnesemia / hyponatremia   replace IV mg   add daily mg supplement   na levels daily   check osmalality , urine lytes     #COVID-19 infection   no hypoxia   C/w remdesevir   if hypoxic will start decadron   supportive treatment   add vit c , vit d , trend lfts , markers           d/w pt and nurse   DVt prophyalxis   acute due to infection , lung opacites and effusion

## 2024-01-20 NOTE — PROGRESS NOTE ADULT - ASSESSMENT
58 year old male with a PMHx of HTN, HLD, type 2 DM (on oral agents), active tobacco smoker (3/4PPD), with complaint of recent unintentional weight loss, dry cough, SOB, left sided pleuritic pain X 4-5 days, found with a loculated left sided pleural effusion, bilateral hilar and mediastinal lymphadenopathy, and a 1cm RLL irregular nodular consolidation with central round gas lucency on CTA chest.  Thoracic Surgery called for consult.   Patient also with RVP +COVID-19.

## 2024-01-20 NOTE — PROGRESS NOTE ADULT - SUBJECTIVE AND OBJECTIVE BOX
PULMONARY PROGRESS NOTE      [id number]    Patient is a 58y old  Male who presents with a chief complaint of     INTERVAL HPI/OVERNIGHT EVENTS: Feeling improved. Chest tube placed by CTS yesterday; still draining. NAD.     MEDICATIONS  (STANDING):  dextrose 5%. 1000 milliLiter(s) (50 mL/Hr) IV Continuous <Continuous>  dextrose 5%. 1000 milliLiter(s) (100 mL/Hr) IV Continuous <Continuous>  dextrose 50% Injectable 25 Gram(s) IV Push once  dextrose 50% Injectable 12.5 Gram(s) IV Push once  dextrose 50% Injectable 25 Gram(s) IV Push once  enoxaparin Injectable 40 milliGRAM(s) SubCutaneous every 24 hours  glucagon  Injectable 1 milliGRAM(s) IntraMuscular once  insulin glargine Injectable (LANTUS) 20 Unit(s) SubCutaneous at bedtime  insulin lispro (ADMELOG) corrective regimen sliding scale   SubCutaneous three times a day before meals  insulin lispro (ADMELOG) corrective regimen sliding scale   SubCutaneous at bedtime  insulin lispro Injectable (ADMELOG) 8 Unit(s) SubCutaneous three times a day before meals  lisinopril 10 milliGRAM(s) Oral daily  nicotine -  14 mG/24Hr(s) Patch 1 Patch Transdermal daily  piperacillin/tazobactam IVPB.. 3.375 Gram(s) IV Intermittent every 8 hours  remdesivir  IVPB 100 milliGRAM(s) IV Intermittent every 24 hours  remdesivir  IVPB   IV Intermittent       MEDICATIONS  (PRN):  acetaminophen     Tablet .. 650 milliGRAM(s) Oral every 6 hours PRN Temp greater or equal to 38C (100.4F), Mild Pain (1 - 3)  aluminum hydroxide/magnesium hydroxide/simethicone Suspension 30 milliLiter(s) Oral every 4 hours PRN Dyspepsia  benzonatate 100 milliGRAM(s) Oral three times a day PRN Cough  dextrose Oral Gel 15 Gram(s) Oral once PRN Blood Glucose LESS THAN 70 milliGRAM(s)/deciliter  melatonin 3 milliGRAM(s) Oral at bedtime PRN Insomnia  ondansetron Injectable 4 milliGRAM(s) IV Push every 8 hours PRN Nausea and/or Vomiting      Allergies    No Known Allergies    Intolerances        PAST MEDICAL & SURGICAL HISTORY:  Hypertension      DM (diabetes mellitus)      No significant past surgical history          SOCIAL HISTORY  Smoking History: smoker               Vital Signs Last 24 Hrs  T(C): 36.6 (20 Jan 2024 07:45), Max: 37.2 (19 Jan 2024 20:28)  T(F): 97.9 (20 Jan 2024 07:45), Max: 98.9 (19 Jan 2024 20:28)  HR: 91 (20 Jan 2024 07:45) (80 - 103)  BP: 121/76 (20 Jan 2024 07:45) (118/73 - 133/71)  BP(mean): --  RR: 18 (20 Jan 2024 07:45) (16 - 18)  SpO2: 96% (20 Jan 2024 07:45) (94% - 96%)    Parameters below as of 20 Jan 2024 07:45  Patient On (Oxygen Delivery Method): room air        PHYSICAL EXAMINATION:    GENERAL: The patient is awake and alert in no apparent distress.     HEENT: Head is normocephalic and atraumatic. Mucous membranes are moist.    NECK: Supple.    LUNGS: rhonchi, decreased BS L, chest tube to drainage, respirations unlabored    HEART: Regular rate and rhythm       ABDOMEN: Soft, nontender, and nondistended.      EXTREMITIES: Without any cyanosis, clubbing, rash, lesions or edema.    NEUROLOGIC: Grossly intact.    LABS:                        13.4   11.71 )-----------( 407      ( 19 Jan 2024 02:37 )             40.0     01-19    129<L>  |  89<L>  |  10.5  ----------------------------<  314<H>  4.5   |  24.0  |  0.71    Ca    8.9      19 Jan 2024 02:37  Mg     1.6     01-18    TPro  6.9  /  Alb  x   /  TBili  x   /  DBili  x   /  AST  x   /  ALT  x   /  AlkPhos  x   01-20    PT/INR - ( 18 Jan 2024 18:15 )   PT: 12.4 sec;   INR: 1.12 ratio         PTT - ( 18 Jan 2024 18:15 )  PTT:33.1 sec            MICROBIOLOGY:  Culture - Fungal, Body Fluid (01.19.24 @ 16:23)    Specimen Source: Pleural Fl Pleural Fluid   Culture Results:   Testing in progress    Culture - Body Fluid with Gram Stain (01.19.24 @ 16:23)    Gram Stain:   polymorphonuclear leukocytes seen  Gram positive cocci in pairs seen  by cytocentrifuge   Specimen Source: Pleural Fl Pleural Fluid    Culture - Blood (01.18.24 @ 18:15)    Specimen Source: .Blood Blood   Culture Results:   No growth at 24 hours      Culture - Blood (01.18.24 @ 18:00)    Specimen Source: .Blood Blood   Culture Results:   No growth at 24 hours      RADIOLOGY & ADDITIONAL STUDIES:  < from: CT Angio Chest PE Protocol w/ IV Cont (01.18.24 @ 21:07) >  ACC: [id number] EXAM:  CT ANGIO CHEST PULM ART [facility]I   ORDERED BY: SCOTTY DAVIS     PROCEDURE DATE:  01/18/2024          INTERPRETATION:  Clinical information: Chest pain. Evaluate for pulmonary   embolus.    CT angiogram of the chest was obtained following administration of   intravenous contrast. Approximately 70 cc of Omnipaque 300 was   administered. Coronal, sagittal and MIP images were submitted for review.    Few small lymph nodes are present in the right paratracheal space, AP   window and thesubcarinal region.    Heart is normal in size. Calcification of the aortic valve and coronary   arteries is noted. No pericardial effusion is noted. Main pulmonary   artery measures 3.4 cm. No filling defects are noted.    No endobronchial lesions are noted. Two nodules demonstrating central   lucency/cavitation are noted in the right upper and right lower lobes.   The larger one is noted in the right lower lobe and measures 1 cm.   Compressive atelectasis is noted involving portion of the left lower   lobe. This is secondary to small to moderate-sized loculated left pleural   effusion.    Below the diaphragm, visualized portions of the abdomen demonstrate   cholelithiasis.    Degenerative changes of the spine are noted.    IMPRESSION: No pulmonary embolus is noted.    Two nodules demonstrating central lucency/cavitation are noted in the   right upper and right lower lobes. Exact etiology is unclear.    Small to moderate-sized loculated left pleural effusion.    --- End of Report ---            JYOTI QUINTANA MD; Attending Radiologist  This document has been electronically signed. Jan 19 2024  7:45AM    < end of copied text >

## 2024-01-20 NOTE — PROGRESS NOTE ADULT - PROBLEM SELECTOR PLAN 1
Maintain left pigtail to suction for increased chest tube output  Repeat chest xray in am  Continue care as per primary team  Thoracic surgery to follow

## 2024-01-20 NOTE — PROGRESS NOTE ADULT - ASSESSMENT
-Emphyema; loculated, chest tube in place    -Smoker, ?copd  -Cough  -COVID +  -Possible asbestos exposure  -R/O malignancy    RECC:  Agree with remdesivir, abx. Recc ID eval. Chest tube mgmt with T-surg. Continue to suction now. F/U pl fl cyto and final cultures.  Follow radiographically with regards to nodules, pl eff.  Sputum culture. Nebs prn. Titrate O2. Smoking cessation a must.      Awaiting dtr to come.

## 2024-01-20 NOTE — PROGRESS NOTE ADULT - SUBJECTIVE AND OBJECTIVE BOX
[facility] Division of Hospital Medicine    Chief Complaint:      SUBJECTIVE / OVERNIGHT EVENTS:    Patient denies chest pain, SOB, abd pain, N/V, fever, chills, dysuria or any other complaints. All remainder ROS negative.     MEDICATIONS  (STANDING):  dextrose 5%. 1000 milliLiter(s) (100 mL/Hr) IV Continuous <Continuous>  dextrose 5%. 1000 milliLiter(s) (50 mL/Hr) IV Continuous <Continuous>  dextrose 50% Injectable 25 Gram(s) IV Push once  dextrose 50% Injectable 12.5 Gram(s) IV Push once  dextrose 50% Injectable 25 Gram(s) IV Push once  enoxaparin Injectable 40 milliGRAM(s) SubCutaneous every 24 hours  glucagon  Injectable 1 milliGRAM(s) IntraMuscular once  insulin glargine Injectable (LANTUS) 20 Unit(s) SubCutaneous at bedtime  insulin lispro (ADMELOG) corrective regimen sliding scale   SubCutaneous three times a day before meals  insulin lispro (ADMELOG) corrective regimen sliding scale   SubCutaneous at bedtime  insulin lispro Injectable (ADMELOG) 8 Unit(s) SubCutaneous three times a day before meals  lisinopril 10 milliGRAM(s) Oral daily  nicotine -  14 mG/24Hr(s) Patch 1 Patch Transdermal daily  piperacillin/tazobactam IVPB.. 3.375 Gram(s) IV Intermittent every 8 hours  remdesivir  IVPB 100 milliGRAM(s) IV Intermittent every 24 hours  remdesivir  IVPB   IV Intermittent     MEDICATIONS  (PRN):  acetaminophen     Tablet .. 650 milliGRAM(s) Oral every 6 hours PRN Temp greater or equal to 38C (100.4F), Mild Pain (1 - 3)  aluminum hydroxide/magnesium hydroxide/simethicone Suspension 30 milliLiter(s) Oral every 4 hours PRN Dyspepsia  benzonatate 100 milliGRAM(s) Oral three times a day PRN Cough  dextrose Oral Gel 15 Gram(s) Oral once PRN Blood Glucose LESS THAN 70 milliGRAM(s)/deciliter  melatonin 3 milliGRAM(s) Oral at bedtime PRN Insomnia  ondansetron Injectable 4 milliGRAM(s) IV Push every 8 hours PRN Nausea and/or Vomiting        I&O's Summary    2024 07:01  -  2024 07:00  --------------------------------------------------------  IN: 0 mL / OUT: 910 mL / NET: -910 mL        PHYSICAL EXAM:  Vital Signs Last 24 Hrs  T(C): 36.6 (2024 07:45), Max: 37.2 (2024 20:28)  T(F): 97.9 (2024 07:45), Max: 98.9 (2024 20:28)  HR: 91 (2024 07:45) (80 - 103)  BP: 121/76 (2024 07:45) (116/76 - 133/71)  BP(mean): --  RR: 18 (2024 07:45) (16 - 18)  SpO2: 96% (2024 07:45) (92% - 96%)    Parameters below as of 2024 07:45  Patient On (Oxygen Delivery Method): room air            CONSTITUTIONAL: NAD, well-developed, well-groomed  ENMT: Moist oral mucosa, no pharyngeal injection or exudates; normal dentition  RESPIRATORY: Normal respiratory effort; lungs are clear to auscultation bilaterally  CARDIOVASCULAR: Regular rate and rhythm, normal S1 and S2, no murmur/rub/gallop; No lower extremity edema; Peripheral pulses are 2+ bilaterally  ABDOMEN: Nontender to palpation, normoactive bowel sounds, no rebound/guarding; No hepatosplenomegaly  MUSCLOSKELETAL:  Normal gait; no clubbing or cyanosis of digits; no joint swelling or tenderness to palpation  PSYCH: A+O to person, place, and time; affect appropriate  NEUROLOGY: CN 2-12 are intact and symmetric; no gross sensory deficits;   SKIN: No rashes; no palpable lesions    LABS:                        13.4   11.71 )-----------( 407      ( 2024 02:37 )             40.0     01-19    129<L>  |  89<L>  |  10.5  ----------------------------<  314<H>  4.5   |  24.0  |  0.71    Ca    8.9      2024 02:37  Mg     1.6         TPro  6.9  /  Alb  x   /  TBili  x   /  DBili  x   /  AST  x   /  ALT  x   /  AlkPhos  x       PT/INR - ( 2024 18:15 )   PT: 12.4 sec;   INR: 1.12 ratio         PTT - ( 2024 18:15 )  PTT:33.1 sec      Urinalysis Basic - ( 2024 07:50 )    Color: Yellow / Appearance: Clear / S.021 / pH: x  Gluc: x / Ketone: 80 mg/dL  / Bili: Negative / Urobili: 0.2 mg/dL   Blood: x / Protein: Negative mg/dL / Nitrite: Negative   Leuk Esterase: Small / RBC: 1 /HPF / WBC 30 /HPF   Sq Epi: x / Non Sq Epi: 4 /HPF / Bacteria: Few /HPF        Culture - Body Fluid with Gram Stain (collected 2024 16:23)  Source: Pleural Fl Pleural Fluid  Gram Stain (2024 01:43):    polymorphonuclear leukocytes seen    Gram positive cocci in pairs seen    by cytocentrifuge    Culture - Fungal, Body Fluid (collected 2024 16:23)  Source: Pleural Fl Pleural Fluid  Preliminary Report (2024 07:56):    Testing in progress    Culture - Urine (collected 2024 07:50)  Source: Clean Catch Clean Catch (Midstream)  Final Report (2024 09:01):    <10,000 CFU/mL Normal Urogenital Louise    Culture - Blood (collected 2024 18:15)  Source: .Blood Blood  Preliminary Report (2024 01:03):    No growth at 24 hours    Culture - Blood (collected 2024 18:00)  Source: .Blood Blood  Preliminary Report (2024 01:03):    No growth at 24 hours      CAPILLARY BLOOD GLUCOSE      POCT Blood Glucose.: 392 mg/dL (2024 07:52)  POCT Blood Glucose.: 347 mg/dL (2024 22:10)  POCT Blood Glucose.: 394 mg/dL (2024 17:03)  POCT Blood Glucose.: 366 mg/dL (2024 12:52)        RADIOLOGY & ADDITIONAL TESTS:  Results Reviewed:   Imaging Personally Reviewed:  Electrocardiogram Personally Reviewed:                                           [facility] Division of Hospital Medicine    Chief Complaint:  sob    SUBJECTIVE / OVERNIGHT EVENTS:    Patient denies chest pain, SOB, abd pain, N/V, fever, chills, dysuria or any other complaints. All remainder ROS negative.     MEDICATIONS  (STANDING):  dextrose 5%. 1000 milliLiter(s) (100 mL/Hr) IV Continuous <Continuous>  dextrose 5%. 1000 milliLiter(s) (50 mL/Hr) IV Continuous <Continuous>  dextrose 50% Injectable 25 Gram(s) IV Push once  dextrose 50% Injectable 12.5 Gram(s) IV Push once  dextrose 50% Injectable 25 Gram(s) IV Push once  enoxaparin Injectable 40 milliGRAM(s) SubCutaneous every 24 hours  glucagon  Injectable 1 milliGRAM(s) IntraMuscular once  insulin glargine Injectable (LANTUS) 20 Unit(s) SubCutaneous at bedtime  insulin lispro (ADMELOG) corrective regimen sliding scale   SubCutaneous three times a day before meals  insulin lispro (ADMELOG) corrective regimen sliding scale   SubCutaneous at bedtime  insulin lispro Injectable (ADMELOG) 8 Unit(s) SubCutaneous three times a day before meals  lisinopril 10 milliGRAM(s) Oral daily  nicotine -  14 mG/24Hr(s) Patch 1 Patch Transdermal daily  piperacillin/tazobactam IVPB.. 3.375 Gram(s) IV Intermittent every 8 hours  remdesivir  IVPB 100 milliGRAM(s) IV Intermittent every 24 hours  remdesivir  IVPB   IV Intermittent     MEDICATIONS  (PRN):  acetaminophen     Tablet .. 650 milliGRAM(s) Oral every 6 hours PRN Temp greater or equal to 38C (100.4F), Mild Pain (1 - 3)  aluminum hydroxide/magnesium hydroxide/simethicone Suspension 30 milliLiter(s) Oral every 4 hours PRN Dyspepsia  benzonatate 100 milliGRAM(s) Oral three times a day PRN Cough  dextrose Oral Gel 15 Gram(s) Oral once PRN Blood Glucose LESS THAN 70 milliGRAM(s)/deciliter  melatonin 3 milliGRAM(s) Oral at bedtime PRN Insomnia  ondansetron Injectable 4 milliGRAM(s) IV Push every 8 hours PRN Nausea and/or Vomiting        I&O's Summary    2024 07:01  -  2024 07:00  --------------------------------------------------------  IN: 0 mL / OUT: 910 mL / NET: -910 mL        PHYSICAL EXAM:  Vital Signs Last 24 Hrs  T(C): 36.6 (2024 07:45), Max: 37.2 (2024 20:28)  T(F): 97.9 (2024 07:45), Max: 98.9 (2024 20:28)  HR: 91 (2024 07:45) (80 - 103)  BP: 121/76 (2024 07:45) (116/76 - 133/71)  BP(mean): --  RR: 18 (2024 07:45) (16 - 18)  SpO2: 96% (2024 07:45) (92% - 96%)    Parameters below as of 2024 07:45  Patient On (Oxygen Delivery Method): room air          General: Age-appearing, in no acute distress  Head: Normocephalic, atraumatic  ENMT: EOMI, no scleral icterus, neck supple, no JVD  Cardiovascular: +S1, S2; Regular rate and rhythm, no murmurs, rubs, gallops  Respiratory: decreased left base  Gastrointestinal: soft, ND, NT, (+) BS, (-) rebound  Extremities: No clubbing, cyanosis  Vascular: 2+ pulses, cap refill < 2 seconds  Neuro: AAOx3, CN2-12 intact, no FND  Musculoskeletal: Normal tone, no deformities  Skin: left chest tube in place, no blood noted   Psych: Calm, cooperative     LABS:                        13.4   11.71 )-----------( 407      ( 2024 02:37 )             40.0     01-19    129<L>  |  89<L>  |  10.5  ----------------------------<  314<H>  4.5   |  24.0  |  0.71    Ca    8.9      2024 02:37  Mg     1.6     01-18    TPro  6.9  /  Alb  x   /  TBili  x   /  DBili  x   /  AST  x   /  ALT  x   /  AlkPhos  x   -    PT/INR - ( 2024 18:15 )   PT: 12.4 sec;   INR: 1.12 ratio         PTT - ( 2024 18:15 )  PTT:33.1 sec      Urinalysis Basic - ( 2024 07:50 )    Color: Yellow / Appearance: Clear / S.021 / pH: x  Gluc: x / Ketone: 80 mg/dL  / Bili: Negative / Urobili: 0.2 mg/dL   Blood: x / Protein: Negative mg/dL / Nitrite: Negative   Leuk Esterase: Small / RBC: 1 /HPF / WBC 30 /HPF   Sq Epi: x / Non Sq Epi: 4 /HPF / Bacteria: Few /HPF        Culture - Body Fluid with Gram Stain (collected 2024 16:23)  Source: Pleural Fl Pleural Fluid  Gram Stain (2024 01:43):    polymorphonuclear leukocytes seen    Gram positive cocci in pairs seen    by cytocentrifuge    Culture - Fungal, Body Fluid (collected 2024 16:23)  Source: Pleural Fl Pleural Fluid  Preliminary Report (2024 07:56):    Testing in progress    Culture - Urine (collected 2024 07:50)  Source: Clean Catch Clean Catch (Midstream)  Final Report (2024 09:01):    <10,000 CFU/mL Normal Urogenital Louise    Culture - Blood (collected 2024 18:15)  Source: .Blood Blood  Preliminary Report (2024 01:03):    No growth at 24 hours    Culture - Blood (collected 2024 18:00)  Source: .Blood Blood  Preliminary Report (2024 01:03):    No growth at 24 hours      CAPILLARY BLOOD GLUCOSE      POCT Blood Glucose.: 392 mg/dL (2024 07:52)  POCT Blood Glucose.: 347 mg/dL (2024 22:10)  POCT Blood Glucose.: 394 mg/dL (2024 17:03)  POCT Blood Glucose.: 366 mg/dL (2024 12:52)        RADIOLOGY & ADDITIONAL TESTS:  Results Reviewed:   Imaging Personally Reviewed:  Electrocardiogram Personally Reviewed:

## 2024-01-20 NOTE — PROGRESS NOTE ADULT - SUBJECTIVE AND OBJECTIVE BOX
Subjective:    VITAL SIGNS  Vital Signs Last 24 Hrs  T(C): 36.3 (01-20-24 @ 05:00), Max: 37.2 (01-19-24 @ 20:28)  T(F): 97.4 (01-20-24 @ 05:00), Max: 98.9 (01-19-24 @ 20:28)  HR: 84 (01-20-24 @ 05:00) (80 - 103)  BP: 121/74 (01-20-24 @ 05:00) (116/76 - 133/71)  RR: 18 (01-20-24 @ 05:00) (16 - 18)  SpO2: 95% (01-20-24 @ 05:00) (92% - 96%)  on (O2)              Telemetry:    LVEF:     MEDICATIONS  acetaminophen     Tablet .. 650 milliGRAM(s) Oral every 6 hours PRN  aluminum hydroxide/magnesium hydroxide/simethicone Suspension 30 milliLiter(s) Oral every 4 hours PRN  benzonatate 100 milliGRAM(s) Oral three times a day PRN  dextrose 5%. 1000 milliLiter(s) IV Continuous <Continuous>  dextrose 5%. 1000 milliLiter(s) IV Continuous <Continuous>  dextrose 50% Injectable 25 Gram(s) IV Push once  dextrose 50% Injectable 12.5 Gram(s) IV Push once  dextrose 50% Injectable 25 Gram(s) IV Push once  dextrose Oral Gel 15 Gram(s) Oral once PRN  enoxaparin Injectable 40 milliGRAM(s) SubCutaneous every 24 hours  glucagon  Injectable 1 milliGRAM(s) IntraMuscular once  insulin glargine Injectable (LANTUS) 15 Unit(s) SubCutaneous at bedtime  insulin lispro (ADMELOG) corrective regimen sliding scale   SubCutaneous three times a day before meals  insulin lispro (ADMELOG) corrective regimen sliding scale   SubCutaneous at bedtime  insulin lispro Injectable (ADMELOG) 5 Unit(s) SubCutaneous three times a day before meals  lisinopril 10 milliGRAM(s) Oral daily  melatonin 3 milliGRAM(s) Oral at bedtime PRN  nicotine -  14 mG/24Hr(s) Patch 1 Patch Transdermal daily  ondansetron Injectable 4 milliGRAM(s) IV Push every 8 hours PRN  piperacillin/tazobactam IVPB.. 3.375 Gram(s) IV Intermittent every 8 hours  remdesivir  IVPB   IV Intermittent   remdesivir  IVPB 100 milliGRAM(s) IV Intermittent every 24 hours      PHYSICAL EXAM  General: well nourished, well developed, no acute distress  Neurology: alert and oriented x 3, nonfocal, no gross deficits  Respiratory: clear to auscultation bilaterally  CV: regular rate and rhythm, normal S1, S2  Abdomen: soft, nontender, nondistended, positive bowel sounds, last bowel movement   Extremities: warm, well perfused. no edema. + DP pulses  Incisions: midline sternal incision, + mepilex, c/d/i. sternum stable.  Chest tubes:   Epicardial Wires:    > EPM (settings) / isolated    I&O's Detail    19 Jan 2024 07:01  -  20 Jan 2024 07:00  --------------------------------------------------------  IN:  Total IN: 0 mL    OUT:    Chest Tube (mL): 910 mL  Total OUT: 910 mL    Total NET: -910 mL          Weights:  Daily     Daily   Admit Wt: Drug Dosing Weight  Height (cm): 180.3 (18 Jan 2024 16:22)  Weight (kg): 80.6 (18 Jan 2024 16:22)  BMI (kg/m2): 24.8 (18 Jan 2024 16:22)  BSA (m2): 2.01 (18 Jan 2024 16:22)    LABS  01-19    129<L>  |  89<L>  |  10.5  ----------------------------<  314<H>  4.5   |  24.0  |  0.71    Ca    8.9      19 Jan 2024 02:37  Mg     1.6     01-18    TPro  6.9  /  Alb  x   /  TBili  x   /  DBili  x   /  AST  x   /  ALT  x   /  AlkPhos  x   01-20                                 13.4   11.71 )-----------( 407      ( 19 Jan 2024 02:37 )             40.0          PT/INR - ( 18 Jan 2024 18:15 )   PT: 12.4 sec;   INR: 1.12 ratio         PTT - ( 18 Jan 2024 18:15 )  PTT:33.1 sec        CAPILLARY BLOOD GLUCOSE      POCT Blood Glucose.: 392 mg/dL (20 Jan 2024 07:52)  POCT Blood Glucose.: 347 mg/dL (19 Jan 2024 22:10)  POCT Blood Glucose.: 394 mg/dL (19 Jan 2024 17:03)  POCT Blood Glucose.: 366 mg/dL (19 Jan 2024 12:52)  POCT Blood Glucose.: 306 mg/dL (19 Jan 2024 08:39)           Today's CXR:    Today's EKG:    PAST MEDICAL & SURGICAL HISTORY:  Hypertension      DM (diabetes mellitus)      No significant past surgical history          Subjective:  Pt. seen & examined, states breathing has improved    VITAL SIGNS  Vital Signs Last 24 Hrs  T(C): 36.3 (01-20-24 @ 05:00), Max: 37.2 (01-19-24 @ 20:28)  T(F): 97.4 (01-20-24 @ 05:00), Max: 98.9 (01-19-24 @ 20:28)  HR: 84 (01-20-24 @ 05:00) (80 - 103)  BP: 121/74 (01-20-24 @ 05:00) (116/76 - 133/71)  RR: 18 (01-20-24 @ 05:00) (16 - 18)  SpO2: 95% (01-20-24 @ 05:00) (92% - 96%)  on (O2)                  MEDICATIONS  acetaminophen     Tablet .. 650 milliGRAM(s) Oral every 6 hours PRN  aluminum hydroxide/magnesium hydroxide/simethicone Suspension 30 milliLiter(s) Oral every 4 hours PRN  benzonatate 100 milliGRAM(s) Oral three times a day PRN  dextrose 5%. 1000 milliLiter(s) IV Continuous <Continuous>  dextrose 5%. 1000 milliLiter(s) IV Continuous <Continuous>  dextrose 50% Injectable 25 Gram(s) IV Push once  dextrose 50% Injectable 12.5 Gram(s) IV Push once  dextrose 50% Injectable 25 Gram(s) IV Push once  dextrose Oral Gel 15 Gram(s) Oral once PRN  enoxaparin Injectable 40 milliGRAM(s) SubCutaneous every 24 hours  glucagon  Injectable 1 milliGRAM(s) IntraMuscular once  insulin glargine Injectable (LANTUS) 15 Unit(s) SubCutaneous at bedtime  insulin lispro (ADMELOG) corrective regimen sliding scale   SubCutaneous three times a day before meals  insulin lispro (ADMELOG) corrective regimen sliding scale   SubCutaneous at bedtime  insulin lispro Injectable (ADMELOG) 5 Unit(s) SubCutaneous three times a day before meals  lisinopril 10 milliGRAM(s) Oral daily  melatonin 3 milliGRAM(s) Oral at bedtime PRN  nicotine -  14 mG/24Hr(s) Patch 1 Patch Transdermal daily  ondansetron Injectable 4 milliGRAM(s) IV Push every 8 hours PRN  piperacillin/tazobactam IVPB.. 3.375 Gram(s) IV Intermittent every 8 hours  remdesivir  IVPB   IV Intermittent   remdesivir  IVPB 100 milliGRAM(s) IV Intermittent every 24 hours      PHYSICAL EXAM  General:  no acute distress  Neurology: alert and oriented x 3, nonfocal, no gross deficits  Respiratory: Diminished on left base, rhonchi on right    CV: regular rate and rhythm, normal S1, S2  Abdomen: soft, nontender, nondistended, positive bowel sounds  Extremities: warm, well perfused. no edema. + DP pulses  Chest tubes: Left pigtail to suction      I&O's Detail    19 Jan 2024 07:01  -  20 Jan 2024 07:00  --------------------------------------------------------  IN:  Total IN: 0 mL    OUT:    Chest Tube (mL): 910 mL  Total OUT: 910 mL    Total NET: -910 mL          Weights:  Daily     Daily   Admit Wt: Drug Dosing Weight  Height (cm): 180.3 (18 Jan 2024 16:22)  Weight (kg): 80.6 (18 Jan 2024 16:22)  BMI (kg/m2): 24.8 (18 Jan 2024 16:22)  BSA (m2): 2.01 (18 Jan 2024 16:22)    LABS  01-19    129<L>  |  89<L>  |  10.5  ----------------------------<  314<H>  4.5   |  24.0  |  0.71    Ca    8.9      19 Jan 2024 02:37  Mg     1.6     01-18    TPro  6.9  /  Alb  x   /  TBili  x   /  DBili  x   /  AST  x   /  ALT  x   /  AlkPhos  x   01-20                                 13.4   11.71 )-----------( 407      ( 19 Jan 2024 02:37 )             40.0          PT/INR - ( 18 Jan 2024 18:15 )   PT: 12.4 sec;   INR: 1.12 ratio         PTT - ( 18 Jan 2024 18:15 )  PTT:33.1 sec      Culture - Body Fluid with Gram Stain (01.19.24 @ 16:23)    Gram Stain:   polymorphonuclear leukocytes seen  Gram positive cocci in pairs seen  by cytocentrifuge   Specimen Source: Pleural Fl Pleural Fluid    Culture - Urine (01.19.24 @ 07:50)    Specimen Source: Clean Catch Clean Catch (Midstream)   Culture Results:   <10,000 CFU/mL Normal Urogenital Louise    Culture - Blood (01.18.24 @ 18:15)    Specimen Source: .Blood Blood   Culture Results:   No growth at 24 hours            CAPILLARY BLOOD GLUCOSE      POCT Blood Glucose.: 392 mg/dL (20 Jan 2024 07:52)  POCT Blood Glucose.: 347 mg/dL (19 Jan 2024 22:10)  POCT Blood Glucose.: 394 mg/dL (19 Jan 2024 17:03)  POCT Blood Glucose.: 366 mg/dL (19 Jan 2024 12:52)  POCT Blood Glucose.: 306 mg/dL (19 Jan 2024 08:39)          CXR:  < from: Xray Chest 2 Views PA/Lat (01.18.24 @ 17:41) >  ACC: [id number] EXAM:  XR CHEST PA LAT 2V   ORDERED BY: LEEROY CHAMBERS     PROCEDURE DATE:  01/18/2024          INTERPRETATION:  TECHNIQUE: Single portable view of the chest.    COMPARISON:  None    CLINICAL HISTORY: cough, sob    FINDINGS:    Single frontal view of the chest demonstrates moderate layering   left-sided pleural effusion. The cardiomediastinal silhouette is normal.   No acute osseous abnormalities. Please refer to the subsequent chest CT   for further details.    IMPRESSION: Moderate layering left-sided pleural effusion.    --- End of Report ---            JEFF GARZA MD; Attending Radiologist  This document has been electronically signed. Jan 19 2024  8:30AM    < end of copied text >    Today's EKG:    PAST MEDICAL & SURGICAL HISTORY:  Hypertension      DM (diabetes mellitus)      No significant past surgical history

## 2024-01-21 LAB
ANION GAP SERPL CALC-SCNC: 11 MMOL/L — SIGNIFICANT CHANGE UP (ref 5–17)
BUN SERPL-MCNC: 16.3 MG/DL — SIGNIFICANT CHANGE UP (ref 8–20)
CALCIUM SERPL-MCNC: 8.4 MG/DL — SIGNIFICANT CHANGE UP (ref 8.4–10.5)
CHLORIDE SERPL-SCNC: 89 MMOL/L — LOW (ref 96–108)
CO2 SERPL-SCNC: 28 MMOL/L — SIGNIFICANT CHANGE UP (ref 22–29)
CREAT SERPL-MCNC: 0.67 MG/DL — SIGNIFICANT CHANGE UP (ref 0.5–1.3)
EGFR: 108 ML/MIN/1.73M2 — SIGNIFICANT CHANGE UP
GLUCOSE BLDC GLUCOMTR-MCNC: 310 MG/DL — HIGH (ref 70–99)
GLUCOSE BLDC GLUCOMTR-MCNC: 330 MG/DL — HIGH (ref 70–99)
GLUCOSE BLDC GLUCOMTR-MCNC: 348 MG/DL — HIGH (ref 70–99)
GLUCOSE BLDC GLUCOMTR-MCNC: 352 MG/DL — HIGH (ref 70–99)
GLUCOSE BLDC GLUCOMTR-MCNC: 361 MG/DL — HIGH (ref 70–99)
GLUCOSE SERPL-MCNC: 357 MG/DL — HIGH (ref 70–99)
HCT VFR BLD CALC: 36.9 % — LOW (ref 39–50)
HGB BLD-MCNC: 12.8 G/DL — LOW (ref 13–17)
MCHC RBC-ENTMCNC: 32.2 PG — SIGNIFICANT CHANGE UP (ref 27–34)
MCHC RBC-ENTMCNC: 34.7 GM/DL — SIGNIFICANT CHANGE UP (ref 32–36)
MCV RBC AUTO: 92.9 FL — SIGNIFICANT CHANGE UP (ref 80–100)
MRSA PCR RESULT.: SIGNIFICANT CHANGE UP
PLATELET # BLD AUTO: 394 K/UL — SIGNIFICANT CHANGE UP (ref 150–400)
POTASSIUM SERPL-MCNC: 4.3 MMOL/L — SIGNIFICANT CHANGE UP (ref 3.5–5.3)
POTASSIUM SERPL-SCNC: 4.3 MMOL/L — SIGNIFICANT CHANGE UP (ref 3.5–5.3)
RBC # BLD: 3.97 M/UL — LOW (ref 4.2–5.8)
RBC # FLD: 11.8 % — SIGNIFICANT CHANGE UP (ref 10.3–14.5)
S AUREUS DNA NOSE QL NAA+PROBE: DETECTED
SODIUM SERPL-SCNC: 128 MMOL/L — LOW (ref 135–145)
WBC # BLD: 12.76 K/UL — HIGH (ref 3.8–10.5)
WBC # FLD AUTO: 12.76 K/UL — HIGH (ref 3.8–10.5)

## 2024-01-21 PROCEDURE — 99232 SBSQ HOSP IP/OBS MODERATE 35: CPT

## 2024-01-21 PROCEDURE — 71045 X-RAY EXAM CHEST 1 VIEW: CPT | Mod: 26

## 2024-01-21 PROCEDURE — 99233 SBSQ HOSP IP/OBS HIGH 50: CPT

## 2024-01-21 PROCEDURE — 71250 CT THORAX DX C-: CPT | Mod: 26

## 2024-01-21 RX ORDER — OXYCODONE HYDROCHLORIDE 5 MG/1
5 TABLET ORAL EVERY 6 HOURS
Refills: 0 | Status: ACTIVE | OUTPATIENT
Start: 2024-01-21 | End: 2024-01-28

## 2024-01-21 RX ORDER — VANCOMYCIN HCL 1 G
1250 VIAL (EA) INTRAVENOUS EVERY 12 HOURS
Refills: 0 | Status: DISCONTINUED | OUTPATIENT
Start: 2024-01-21 | End: 2024-01-22

## 2024-01-21 RX ORDER — VANCOMYCIN HCL 1 G
VIAL (EA) INTRAVENOUS
Refills: 0 | Status: DISCONTINUED | OUTPATIENT
Start: 2024-01-21 | End: 2024-01-21

## 2024-01-21 RX ORDER — INSULIN LISPRO 100/ML
10 VIAL (ML) SUBCUTANEOUS
Refills: 0 | Status: DISCONTINUED | OUTPATIENT
Start: 2024-01-21 | End: 2024-01-22

## 2024-01-21 RX ORDER — INSULIN GLARGINE 100 [IU]/ML
25 INJECTION, SOLUTION SUBCUTANEOUS AT BEDTIME
Refills: 0 | Status: DISCONTINUED | OUTPATIENT
Start: 2024-01-21 | End: 2024-01-22

## 2024-01-21 RX ADMIN — Medication 8 UNIT(S): at 08:53

## 2024-01-21 RX ADMIN — PIPERACILLIN AND TAZOBACTAM 25 GRAM(S): 4; .5 INJECTION, POWDER, LYOPHILIZED, FOR SOLUTION INTRAVENOUS at 17:26

## 2024-01-21 RX ADMIN — Medication 4: at 08:53

## 2024-01-21 RX ADMIN — Medication 5: at 11:40

## 2024-01-21 RX ADMIN — Medication 2: at 23:06

## 2024-01-21 RX ADMIN — Medication 166.67 MILLIGRAM(S): at 14:26

## 2024-01-21 RX ADMIN — PIPERACILLIN AND TAZOBACTAM 25 GRAM(S): 4; .5 INJECTION, POWDER, LYOPHILIZED, FOR SOLUTION INTRAVENOUS at 01:29

## 2024-01-21 RX ADMIN — ENOXAPARIN SODIUM 40 MILLIGRAM(S): 100 INJECTION SUBCUTANEOUS at 23:08

## 2024-01-21 RX ADMIN — Medication 1 PATCH: at 11:30

## 2024-01-21 RX ADMIN — LISINOPRIL 10 MILLIGRAM(S): 2.5 TABLET ORAL at 06:54

## 2024-01-21 RX ADMIN — PIPERACILLIN AND TAZOBACTAM 25 GRAM(S): 4; .5 INJECTION, POWDER, LYOPHILIZED, FOR SOLUTION INTRAVENOUS at 08:45

## 2024-01-21 RX ADMIN — Medication 1 PATCH: at 18:13

## 2024-01-21 RX ADMIN — OXYCODONE HYDROCHLORIDE 5 MILLIGRAM(S): 5 TABLET ORAL at 11:30

## 2024-01-21 RX ADMIN — Medication 10 UNIT(S): at 16:39

## 2024-01-21 RX ADMIN — Medication 5: at 16:38

## 2024-01-21 RX ADMIN — REMDESIVIR 200 MILLIGRAM(S): 5 INJECTION INTRAVENOUS at 06:56

## 2024-01-21 RX ADMIN — Medication 8 UNIT(S): at 11:40

## 2024-01-21 RX ADMIN — Medication 1 PATCH: at 07:52

## 2024-01-21 RX ADMIN — INSULIN GLARGINE 25 UNIT(S): 100 INJECTION, SOLUTION SUBCUTANEOUS at 23:06

## 2024-01-21 NOTE — PROGRESS NOTE ADULT - ASSESSMENT
58 year old male with a PMHx of HTN, HLD, type 2 DM (on oral agents), active tobacco smoker (3/4PPD), with complaint of recent unintentional weight loss, dry cough, SOB, left sided pleuritic pain X 4-5 days, found with a loculated left sided pleural effusion, bilateral hilar and mediastinal lymphadenopathy, and a 1cm RLL irregular nodular consolidation with central round gas lucency on CTA chest.  Thoracic Surgery called for consult. Patient also with + COVID 58 year old male with a PMHx of HTN, HLD, type 2 DM (on oral agents), active tobacco smoker (3/4PPD), with complaint of recent unintentional weight loss, dry cough, SOB, left sided pleuritic pain X 4-5 days, found with a loculated left sided pleural effusion, bilateral hilar and mediastinal lymphadenopathy, and a 1cm RLL irregular nodular consolidation with central round gas lucency on CTA chest. Thoracic Surgery called for consult. Patient also with + COVID. S/p insertion left PTC with purulent drainage. Pleural fluid culture + gram cocci.

## 2024-01-21 NOTE — PROGRESS NOTE ADULT - SUBJECTIVE AND OBJECTIVE BOX
PULMONARY PROGRESS NOTE      [id number]    Patient is a 58y old  Male who presents with a chief complaint of COVID, loculated effusion (20 Jan 2024 10:28)      INTERVAL HPI/OVERNIGHT EVENTS: Main complaint today if pain at CT site. Still draining.     MEDICATIONS  (STANDING):  dextrose 5%. 1000 milliLiter(s) (50 mL/Hr) IV Continuous <Continuous>  dextrose 5%. 1000 milliLiter(s) (100 mL/Hr) IV Continuous <Continuous>  dextrose 50% Injectable 25 Gram(s) IV Push once  dextrose 50% Injectable 12.5 Gram(s) IV Push once  dextrose 50% Injectable 25 Gram(s) IV Push once  enoxaparin Injectable 40 milliGRAM(s) SubCutaneous every 24 hours  glucagon  Injectable 1 milliGRAM(s) IntraMuscular once  insulin glargine Injectable (LANTUS) 20 Unit(s) SubCutaneous at bedtime  insulin lispro (ADMELOG) corrective regimen sliding scale   SubCutaneous three times a day before meals  insulin lispro (ADMELOG) corrective regimen sliding scale   SubCutaneous at bedtime  insulin lispro Injectable (ADMELOG) 8 Unit(s) SubCutaneous three times a day before meals  lisinopril 10 milliGRAM(s) Oral daily  nicotine -  14 mG/24Hr(s) Patch 1 Patch Transdermal daily  piperacillin/tazobactam IVPB.. 3.375 Gram(s) IV Intermittent every 8 hours  remdesivir  IVPB 100 milliGRAM(s) IV Intermittent every 24 hours  remdesivir  IVPB   IV Intermittent       MEDICATIONS  (PRN):  acetaminophen     Tablet .. 650 milliGRAM(s) Oral every 6 hours PRN Temp greater or equal to 38C (100.4F), Mild Pain (1 - 3)  aluminum hydroxide/magnesium hydroxide/simethicone Suspension 30 milliLiter(s) Oral every 4 hours PRN Dyspepsia  benzonatate 100 milliGRAM(s) Oral three times a day PRN Cough  dextrose Oral Gel 15 Gram(s) Oral once PRN Blood Glucose LESS THAN 70 milliGRAM(s)/deciliter  melatonin 3 milliGRAM(s) Oral at bedtime PRN Insomnia  ondansetron Injectable 4 milliGRAM(s) IV Push every 8 hours PRN Nausea and/or Vomiting  oxyCODONE    IR 5 milliGRAM(s) Oral every 6 hours PRN Severe Pain (7 - 10)      Allergies    No Known Allergies    Intolerances        PAST MEDICAL & SURGICAL HISTORY:  Hypertension      DM (diabetes mellitus)      No significant past surgical history          SOCIAL HISTORY  Smoking History: smoker      REVIEW OF SYSTEMS:    CONSTITUTIONAL:  No distress    HEENT:  Eyes:  No diplopia or blurred vision. ENT:  No earache, sore throat or runny nose.    CARDIOVASCULAR:  No pressure, squeezing, tightness, heaviness or aching about the chest; no palpitations.    RESPIRATORY:  per HPI     GASTROINTESTINAL:  No nausea, vomiting or diarrhea.    GENITOURINARY:  No dysuria, frequency or urgency.    NEUROLOGIC:  No paresthesias, fasciculations, seizures or weakness.    PSYCHIATRIC:  No disorder of thought or mood.    Vital Signs Last 24 Hrs  T(C): 36.7 (21 Jan 2024 08:00), Max: 37.2 (20 Jan 2024 17:05)  T(F): 98 (21 Jan 2024 08:00), Max: 98.9 (20 Jan 2024 17:05)  HR: 78 (21 Jan 2024 08:00) (73 - 90)  BP: 100/60 (21 Jan 2024 08:00) (100/60 - 115/71)  BP(mean): 80 (20 Jan 2024 17:05) (80 - 80)  RR: 16 (21 Jan 2024 08:00) (16 - 18)  SpO2: 97% (21 Jan 2024 08:00) (93% - 97%)    Parameters below as of 21 Jan 2024 08:00  Patient On (Oxygen Delivery Method): room air        PHYSICAL EXAMINATION:    GENERAL: The patient is awake and alert in no apparent distress.     HEENT: Head is normocephalic and atraumatic.  Mucous membranes are moist.    NECK: Supple.    LUNGS: decreased on L, CT site looks ok, respirations unlabored    HEART: Regular rate and rhythm     ABDOMEN: Soft, nontender, and nondistended.      EXTREMITIES: Without any cyanosis, clubbing, rash, lesions or edema.    NEUROLOGIC: Grossly intact.    LABS:        TPro  6.9  /  Alb  x   /  TBili  x   /  DBili  x   /  AST  x   /  ALT  x   /  AlkPhos  x   01-20         MICROBIOLOGY:  Culture - Body Fluid with Gram Stain (01.19.24 @ 16:23)    Gram Stain:   polymorphonuclear leukocytes seen  Gram positive cocci in pairs seen  by cytocentrifuge   Specimen Source: Pleural Fl Pleural Fluid        RADIOLOGY & ADDITIONAL STUDIES:  no change in cxr today 1/21/24

## 2024-01-21 NOTE — PROGRESS NOTE ADULT - SUBJECTIVE AND OBJECTIVE BOX
[facility] Division of Hospital Medicine    Chief Complaint:      SUBJECTIVE / OVERNIGHT EVENTS:    Patient denies chest pain, SOB, abd pain, N/V, fever, chills, dysuria or any other complaints. All remainder ROS negative.     MEDICATIONS  (STANDING):  dextrose 5%. 1000 milliLiter(s) (50 mL/Hr) IV Continuous <Continuous>  dextrose 5%. 1000 milliLiter(s) (100 mL/Hr) IV Continuous <Continuous>  dextrose 50% Injectable 25 Gram(s) IV Push once  dextrose 50% Injectable 12.5 Gram(s) IV Push once  dextrose 50% Injectable 25 Gram(s) IV Push once  enoxaparin Injectable 40 milliGRAM(s) SubCutaneous every 24 hours  glucagon  Injectable 1 milliGRAM(s) IntraMuscular once  insulin glargine Injectable (LANTUS) 25 Unit(s) SubCutaneous at bedtime  insulin lispro (ADMELOG) corrective regimen sliding scale   SubCutaneous three times a day before meals  insulin lispro (ADMELOG) corrective regimen sliding scale   SubCutaneous at bedtime  insulin lispro Injectable (ADMELOG) 10 Unit(s) SubCutaneous three times a day before meals  lisinopril 10 milliGRAM(s) Oral daily  nicotine -  14 mG/24Hr(s) Patch 1 Patch Transdermal daily  piperacillin/tazobactam IVPB.. 3.375 Gram(s) IV Intermittent every 8 hours  remdesivir  IVPB   IV Intermittent   remdesivir  IVPB 100 milliGRAM(s) IV Intermittent every 24 hours  vancomycin  IVPB        MEDICATIONS  (PRN):  acetaminophen     Tablet .. 650 milliGRAM(s) Oral every 6 hours PRN Temp greater or equal to 38C (100.4F), Mild Pain (1 - 3)  aluminum hydroxide/magnesium hydroxide/simethicone Suspension 30 milliLiter(s) Oral every 4 hours PRN Dyspepsia  benzonatate 100 milliGRAM(s) Oral three times a day PRN Cough  dextrose Oral Gel 15 Gram(s) Oral once PRN Blood Glucose LESS THAN 70 milliGRAM(s)/deciliter  melatonin 3 milliGRAM(s) Oral at bedtime PRN Insomnia  ondansetron Injectable 4 milliGRAM(s) IV Push every 8 hours PRN Nausea and/or Vomiting  oxyCODONE    IR 5 milliGRAM(s) Oral every 6 hours PRN Severe Pain (7 - 10)        I&O's Summary    20 Jan 2024 07:01  -  21 Jan 2024 07:00  --------------------------------------------------------  IN: 0 mL / OUT: 80 mL / NET: -80 mL        PHYSICAL EXAM:  Vital Signs Last 24 Hrs  T(C): 36.7 (21 Jan 2024 08:00), Max: 37.2 (20 Jan 2024 17:05)  T(F): 98 (21 Jan 2024 08:00), Max: 98.9 (20 Jan 2024 17:05)  HR: 78 (21 Jan 2024 08:00) (73 - 90)  BP: 100/60 (21 Jan 2024 08:00) (100/60 - 115/71)  BP(mean): 80 (20 Jan 2024 17:05) (80 - 80)  RR: 16 (21 Jan 2024 08:00) (16 - 18)  SpO2: 97% (21 Jan 2024 08:00) (93% - 97%)    Parameters below as of 21 Jan 2024 08:00  Patient On (Oxygen Delivery Method): room air            CONSTITUTIONAL: NAD, well-developed, well-groomed  ENMT: Moist oral mucosa, no pharyngeal injection or exudates; normal dentition  RESPIRATORY: Normal respiratory effort; lungs are clear to auscultation bilaterally  CARDIOVASCULAR: Regular rate and rhythm, normal S1 and S2, no murmur/rub/gallop; No lower extremity edema; Peripheral pulses are 2+ bilaterally  ABDOMEN: Nontender to palpation, normoactive bowel sounds, no rebound/guarding; No hepatosplenomegaly  MUSCLOSKELETAL:  Normal gait; no clubbing or cyanosis of digits; no joint swelling or tenderness to palpation  PSYCH: A+O to person, place, and time; affect appropriate  NEUROLOGY: CN 2-12 are intact and symmetric; no gross sensory deficits;   SKIN: No rashes; no palpable lesions    LABS:        TPro  6.9  /  Alb  x   /  TBili  x   /  DBili  x   /  AST  x   /  ALT  x   /  AlkPhos  x   01-20              Culture - Fungal, Body Fluid (collected 19 Jan 2024 16:23)  Source: Pleural Fl Pleural Fluid  Preliminary Report (20 Jan 2024 07:56):    Testing in progress    Culture - Body Fluid with Gram Stain (collected 19 Jan 2024 16:23)  Source: Pleural Fl Pleural Fluid  Gram Stain (20 Jan 2024 01:43):    polymorphonuclear leukocytes seen    Gram positive cocci in pairs seen    by cytocentrifuge    Culture - Urine (collected 19 Jan 2024 07:50)  Source: Clean Catch Clean Catch (Midstream)  Final Report (20 Jan 2024 09:01):    <10,000 CFU/mL Normal Urogenital Louise    Culture - Blood (collected 18 Jan 2024 18:15)  Source: .Blood Blood  Preliminary Report (21 Jan 2024 01:02):    No growth at 48 Hours    Culture - Blood (collected 18 Jan 2024 18:00)  Source: .Blood Blood  Preliminary Report (21 Jan 2024 01:02):    No growth at 48 Hours      CAPILLARY BLOOD GLUCOSE      POCT Blood Glucose.: 361 mg/dL (21 Jan 2024 11:36)  POCT Blood Glucose.: 330 mg/dL (21 Jan 2024 08:50)  POCT Blood Glucose.: 303 mg/dL (20 Jan 2024 23:39)  POCT Blood Glucose.: 339 mg/dL (20 Jan 2024 17:30)  POCT Blood Glucose.: 473 mg/dL (20 Jan 2024 12:24)        RADIOLOGY & ADDITIONAL TESTS:  Results Reviewed:   Imaging Personally Reviewed:  Electrocardiogram Personally Reviewed:                                           [facility] Division of Hospital Medicine    Chief Complaint:  sob    SUBJECTIVE / OVERNIGHT EVENTS:    Patient denies chest pain, SOB, abd pain, N/V, fever, chills, dysuria or any other complaints. All remainder ROS negative.     MEDICATIONS  (STANDING):  dextrose 5%. 1000 milliLiter(s) (50 mL/Hr) IV Continuous <Continuous>  dextrose 5%. 1000 milliLiter(s) (100 mL/Hr) IV Continuous <Continuous>  dextrose 50% Injectable 25 Gram(s) IV Push once  dextrose 50% Injectable 12.5 Gram(s) IV Push once  dextrose 50% Injectable 25 Gram(s) IV Push once  enoxaparin Injectable 40 milliGRAM(s) SubCutaneous every 24 hours  glucagon  Injectable 1 milliGRAM(s) IntraMuscular once  insulin glargine Injectable (LANTUS) 25 Unit(s) SubCutaneous at bedtime  insulin lispro (ADMELOG) corrective regimen sliding scale   SubCutaneous three times a day before meals  insulin lispro (ADMELOG) corrective regimen sliding scale   SubCutaneous at bedtime  insulin lispro Injectable (ADMELOG) 10 Unit(s) SubCutaneous three times a day before meals  lisinopril 10 milliGRAM(s) Oral daily  nicotine -  14 mG/24Hr(s) Patch 1 Patch Transdermal daily  piperacillin/tazobactam IVPB.. 3.375 Gram(s) IV Intermittent every 8 hours  remdesivir  IVPB   IV Intermittent   remdesivir  IVPB 100 milliGRAM(s) IV Intermittent every 24 hours  vancomycin  IVPB        MEDICATIONS  (PRN):  acetaminophen     Tablet .. 650 milliGRAM(s) Oral every 6 hours PRN Temp greater or equal to 38C (100.4F), Mild Pain (1 - 3)  aluminum hydroxide/magnesium hydroxide/simethicone Suspension 30 milliLiter(s) Oral every 4 hours PRN Dyspepsia  benzonatate 100 milliGRAM(s) Oral three times a day PRN Cough  dextrose Oral Gel 15 Gram(s) Oral once PRN Blood Glucose LESS THAN 70 milliGRAM(s)/deciliter  melatonin 3 milliGRAM(s) Oral at bedtime PRN Insomnia  ondansetron Injectable 4 milliGRAM(s) IV Push every 8 hours PRN Nausea and/or Vomiting  oxyCODONE    IR 5 milliGRAM(s) Oral every 6 hours PRN Severe Pain (7 - 10)        I&O's Summary    20 Jan 2024 07:01  -  21 Jan 2024 07:00  --------------------------------------------------------  IN: 0 mL / OUT: 80 mL / NET: -80 mL        PHYSICAL EXAM:  Vital Signs Last 24 Hrs  T(C): 36.7 (21 Jan 2024 08:00), Max: 37.2 (20 Jan 2024 17:05)  T(F): 98 (21 Jan 2024 08:00), Max: 98.9 (20 Jan 2024 17:05)  HR: 78 (21 Jan 2024 08:00) (73 - 90)  BP: 100/60 (21 Jan 2024 08:00) (100/60 - 115/71)  BP(mean): 80 (20 Jan 2024 17:05) (80 - 80)  RR: 16 (21 Jan 2024 08:00) (16 - 18)  SpO2: 97% (21 Jan 2024 08:00) (93% - 97%)    Parameters below as of 21 Jan 2024 08:00  Patient On (Oxygen Delivery Method): room air          General: Age-appearing, in no acute distress  Head: Normocephalic, atraumatic  ENMT: EOMI, no scleral icterus, neck supple, no JVD  Cardiovascular: +S1, S2; Regular rate and rhythm, no murmurs, rubs, gallops  Respiratory: decreased left base  Gastrointestinal: soft, ND, NT, (+) BS, (-) rebound  Extremities: No clubbing, cyanosis  Vascular: 2+ pulses, cap refill < 2 seconds  Neuro: AAOx3, CN2-12 intact, no FND  Musculoskeletal: Normal tone, no deformities  Skin: left chest tube in place, no blood noted   Psych: Calm, cooperative     LABS:        TPro  6.9  /  Alb  x   /  TBili  x   /  DBili  x   /  AST  x   /  ALT  x   /  AlkPhos  x   01-20              Culture - Fungal, Body Fluid (collected 19 Jan 2024 16:23)  Source: Pleural Fl Pleural Fluid  Preliminary Report (20 Jan 2024 07:56):    Testing in progress    Culture - Body Fluid with Gram Stain (collected 19 Jan 2024 16:23)  Source: Pleural Fl Pleural Fluid  Gram Stain (20 Jan 2024 01:43):    polymorphonuclear leukocytes seen    Gram positive cocci in pairs seen    by cytocentrifuge    Culture - Urine (collected 19 Jan 2024 07:50)  Source: Clean Catch Clean Catch (Midstream)  Final Report (20 Jan 2024 09:01):    <10,000 CFU/mL Normal Urogenital Louise    Culture - Blood (collected 18 Jan 2024 18:15)  Source: .Blood Blood  Preliminary Report (21 Jan 2024 01:02):    No growth at 48 Hours    Culture - Blood (collected 18 Jan 2024 18:00)  Source: .Blood Blood  Preliminary Report (21 Jan 2024 01:02):    No growth at 48 Hours      CAPILLARY BLOOD GLUCOSE      POCT Blood Glucose.: 361 mg/dL (21 Jan 2024 11:36)  POCT Blood Glucose.: 330 mg/dL (21 Jan 2024 08:50)  POCT Blood Glucose.: 303 mg/dL (20 Jan 2024 23:39)  POCT Blood Glucose.: 339 mg/dL (20 Jan 2024 17:30)  POCT Blood Glucose.: 473 mg/dL (20 Jan 2024 12:24)        RADIOLOGY & ADDITIONAL TESTS:  Results Reviewed:   Imaging Personally Reviewed:  Electrocardiogram Personally Reviewed:

## 2024-01-21 NOTE — PROGRESS NOTE ADULT - ASSESSMENT
59 y/o male with PMH of HTN, DM-2, active tobacco use was sent to the ED for abnormal outpatient CT chest. Patient reported pain on his left side x 4-5days associated with dry cough and shortness of breath. Sent for CT chest which showed pleural effusion and right sided lung nodules. Patient reported decrease appetite and weight loss (unintentional). In the ED, CTA chest: 2 loculated pleural fluid collections on the left; mildly prominent bilateral hilar and mediastinal lymphadenopathy. 1cm irregular nodular consolidation either bronchiolar dilatation or cavitation likely infection vs cavitary neoplasm.     #Empyema   s/p chest tube 1/19  Fluid growing gram (+) cocci  Start Vancomycin 1g q12, adjust by trough  CT chest as noted above   F/u CTS recs,    , tele   F/u Pulm recs  ID consult appreciated     #HTN   Lisinopril 10mg   Pt counseled on diet/lifestyle modifications     #Hyperglycemia with DM-2   A1c >14  Lantus increased to 25u qhs  Admelog increased to 10u qac  ISS, FSG qac/qhs  diabetic diet   Pt counseled on diet/lifestyle modifications     #Hypomagnesemia / hyponatremia   replace IV mg   add daily mg supplement   na levels daily   check osmalality , urine lytes     #COVID-19 infection   no hypoxia   C/w remdesevir   if hypoxic will start decadron   supportive treatment   add vit c , vit d , trend lfts , markers       d/w pt and nurse   DVt prophyalxis   acute due to infection

## 2024-01-21 NOTE — PROGRESS NOTE ADULT - ASSESSMENT
-Emphyema; loculated, chest tube in place; GPC in fluid, identification pending  -Smoker, ?copd  -Cough  -COVID +  -Possible asbestos exposure  -R/O malignancy    RECC:  Agree with remdesivir, abx. Recc ID eval. Dose of vanco until we know ID of organism. Chest tube mgmt with T-surg. Continue to suction now. F/U pl fl cyto and final cultures.  Follow radiographically with regards to nodules, pl eff.  Sputum culture. Nebs prn. Titrate O2. Smoking cessation a must.      D/W Dr Mena.

## 2024-01-21 NOTE — PROGRESS NOTE ADULT - PROBLEM SELECTOR PLAN 1
Maintain left pigtail to suction given empyema  +Covid with superimposed bacterial PNA  Fluid gram + cocci in pairs, continue to follow cultures  Blood cx NGTD  Consider ID consult, Check MRSA swab  Daily CXR while tube in place  Continue care as per primary team  Thoracic surgery to follow Maintain left pigtail to suction given empyema  +Covid with superimposed bacterial PNA  Pleural fluid with gram + cocci in pairs, continue to follow cultures  On Zosyn currently  Blood cx NGTD  Trend WBC, monitor for fevers  Consider ID consult, Check MRSA swab  Daily CXR while tube in place  Continue care as per primary team  Thoracic surgery to follow CTA Chest with loculated left sided pleural effusion, bilateral hilar and mediastinal lymphadenopathy, two nodules demonstrating central lucency/cavitation are noted in the right upper and right lower lobes  Maintain left pigtail to suction given empyema  +Covid with superimposed bacterial PNA  Pleural fluid with gram + cocci in pairs, continue to follow cultures  On Zosyn currently, consider adding Vanco (discussed with Pulmonary)  Consider ID consult, Check MRSA swab  Blood cx NGTD  Trend WBC, monitor for fevers  Daily CXR while tube in place  History of factory work, active smoker, 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

## 2024-01-21 NOTE — PROGRESS NOTE ADULT - SUBJECTIVE AND OBJECTIVE BOX
BRIEF HOSPITAL COURSE  58 year old male with a PMHx of HTN, HLD, type 2 DM (on oral agents), active tobacco smoker (3/4PPD), with complaint of recent unintentional weight loss, dry cough, SOB, left sided pleuritic pain X 4-5 days, found with a loculated left sided pleural effusion, bilateral hilar and mediastinal lymphadenopathy, and a 1cm RLL irregular nodular consolidation with central round gas lucency on CTA chest. Thoracic Surgery called for consult. Patient also with + COVID. S/p insertion left PTC with purulent drainage. Pleural fluid culture + gram cocci.    SIGNIFICANT RECENT/PAST 24 HR EVENTS  No acute events reported overnight.     SUBJECTIVE  Patient seen and examined on follow up for left loculated effusion. Pt currently lying OOB to chair. Denies fevers, chills, HA, dizziness, CP, SOB, abd pain, N/V/D, numbness/tingling in extremities, or any other acute complaints. States improvement in symptoms.  +Ambulating during day  +Using incentive as instructed  ROS negative x 10 systems except as noted above.    PAST MEDICAL & SURGICAL HISTORY  Hypertension  DM (diabetes mellitus)  No significant past surgical history    DAILY REVIEW  Vitals   T(C): 36.7 (21 Jan 2024 08:00), Max: 37.2 (20 Jan 2024 17:05)  T(F): 98 (21 Jan 2024 08:00), Max: 98.9 (20 Jan 2024 17:05)  HR: 78 (21 Jan 2024 08:00) (73 - 90)  BP: 100/60 (21 Jan 2024 08:00) (100/60 - 115/71)  BP(mean): 80 (20 Jan 2024 17:05) (80 - 80)  RR: 16 (21 Jan 2024 08:00) (16 - 18)  SpO2: 97% (21 Jan 2024 08:00) (93% - 97%)    O2 Parameters below as of 21 Jan 2024 08:00  Patient On (Oxygen Delivery Method): room air    I&O's Detail    20 Jan 2024 07:01  -  21 Jan 2024 07:00  --------------------------------------------------------  IN:  Total IN: 0 mL    OUT:    Chest Tube (mL): 80 mL  Total OUT: 80 mL    Total NET: -80 mL    Admit Wt: Drug Dosing Weight  Height (cm): 180.3 (18 Jan 2024 16:22)  Weight (kg): 80.6 (18 Jan 2024 16:22)  BMI (kg/m2): 24.8 (18 Jan 2024 16:22)  BSA (m2): 2.01 (18 Jan 2024 16:22)    LABS    TPro  6.9  /  Alb  x   /  TBili  x   /  DBili  x   /  AST  x   /  ALT  x   /  AlkPhos  x   01-20    LIVER FUNCTIONS - ( 20 Jan 2024 05:50 )  Alb: x     / Pro: 6.9 g/dL / ALK PHOS: x     / ALT: x     / AST: x     / GGT: x           MEDICATIONS  MEDICATIONS  (STANDING):  dextrose 5%. 1000 milliLiter(s) (50 mL/Hr) IV Continuous <Continuous>  dextrose 5%. 1000 milliLiter(s) (100 mL/Hr) IV Continuous <Continuous>  dextrose 50% Injectable 25 Gram(s) IV Push once  dextrose 50% Injectable 12.5 Gram(s) IV Push once  dextrose 50% Injectable 25 Gram(s) IV Push once  enoxaparin Injectable 40 milliGRAM(s) SubCutaneous every 24 hours  glucagon  Injectable 1 milliGRAM(s) IntraMuscular once  insulin glargine Injectable (LANTUS) 25 Unit(s) SubCutaneous at bedtime  insulin lispro (ADMELOG) corrective regimen sliding scale   SubCutaneous three times a day before meals  insulin lispro (ADMELOG) corrective regimen sliding scale   SubCutaneous at bedtime  insulin lispro Injectable (ADMELOG) 10 Unit(s) SubCutaneous three times a day before meals  lisinopril 10 milliGRAM(s) Oral daily  nicotine -  14 mG/24Hr(s) Patch 1 Patch Transdermal daily  piperacillin/tazobactam IVPB.. 3.375 Gram(s) IV Intermittent every 8 hours  remdesivir  IVPB   IV Intermittent   remdesivir  IVPB 100 milliGRAM(s) IV Intermittent every 24 hours  vancomycin  IVPB        MEDICATIONS  (PRN):  acetaminophen     Tablet .. 650 milliGRAM(s) Oral every 6 hours PRN Temp greater or equal to 38C (100.4F), Mild Pain (1 - 3)  aluminum hydroxide/magnesium hydroxide/simethicone Suspension 30 milliLiter(s) Oral every 4 hours PRN Dyspepsia  benzonatate 100 milliGRAM(s) Oral three times a day PRN Cough  dextrose Oral Gel 15 Gram(s) Oral once PRN Blood Glucose LESS THAN 70 milliGRAM(s)/deciliter  melatonin 3 milliGRAM(s) Oral at bedtime PRN Insomnia  ondansetron Injectable 4 milliGRAM(s) IV Push every 8 hours PRN Nausea and/or Vomiting  oxyCODONE    IR 5 milliGRAM(s) Oral every 6 hours PRN Severe Pain (7 - 10)    ALLERGIES  No Known Allergies    DIAGNOSTICS  All relevant and available laboratory results, radiology and medications reviewed.  Xray Chest 1 View- PORTABLE-Routine (Xray Chest 1 View- PORTABLE-Routine in AM.) (01.20.24 @ 06:41)  Findings:  Impression: Status post left chest tube. The heart is unremarkable. Trace   left pleural effusion. No pneumothorax.    CT Angio Chest PE Protocol w/ IV Cont (01.18.24 @ 21:07)  CT angiogram of the chest was obtained following administration of   intravenous contrast. Approximately 70 cc of Omnipaque 300 was   administered. Coronal, sagittal and MIP images were submitted for review.    Few small lymph nodes are present in the right paratracheal space, AP   window and thesubcarinal region.    Heart is normal in size. Calcification of the aortic valve and coronary   arteries is noted. No pericardial effusion is noted. Main pulmonary   artery measures 3.4 cm. No filling defects are noted.    No endobronchial lesions are noted. Two nodules demonstrating central   lucency/cavitation are noted in the right upper and right lower lobes.   The larger one is noted in the right lower lobe and measures 1 cm.   Compressive atelectasis is noted involving portion of the left lower   lobe. This is secondary to small to moderate-sized loculated left pleural   effusion.    Below the diaphragm, visualized portions of the abdomen demonstrate   cholelithiasis.    Degenerative changes of the spine are noted.    IMPRESSION: No pulmonary embolus is noted.  Two nodules demonstrating central lucency/cavitation are noted in the   right upper and right lower lobes. Exact etiology is unclear.    Small to moderate-sized loculated left pleural effusion.    Xray Chest 2 Views PA/Lat (01.18.24 @ 17:41)  FINDINGS:  Single frontal view of the chest demonstrates moderate layering   left-sided pleural effusion. The cardiomediastinal silhouette is normal.   No acute osseous abnormalities. Please refer to the subsequent chest CT   for further details.    IMPRESSION: Moderate layering left-sided pleural effusion.    PHYSICAL EXAM  Constitutional: NAD, well developed  Neck: supple, trachea midline. No JVD   Respiratory: Breath sounds diminished left base, no accessory muscle use noted. No wheezing, rales, or rhonchi noted b/l   Cardiovascular: Regular rate, regular rhythm, normal S1, S2; no murmurs or rub   Gastrointestinal: Soft, non-tender, non-distended, + bowel sounds   Extremities: SINGH x 4, no peripheral edema, no cyanosis, no clubbing    Vascular: Equal and normal pulses: 2+ peripheral pulses throughout  Neurological: A+O x 3; speech clear and intact; no gross sensory/motor deficits  Psychiatric: calm, normal mood, normal affect   Skin: warm, dry, well perfused, no rashes   Tubes/Lines: Lt pleural PTC to sxn, purulent output, no obvious air leak noted

## 2024-01-22 LAB
-  AMPICILLIN/SULBACTAM: SIGNIFICANT CHANGE UP
-  CEFAZOLIN: SIGNIFICANT CHANGE UP
-  CLINDAMYCIN: SIGNIFICANT CHANGE UP
-  ERYTHROMYCIN: SIGNIFICANT CHANGE UP
-  GENTAMICIN: SIGNIFICANT CHANGE UP
-  OXACILLIN: SIGNIFICANT CHANGE UP
-  PENICILLIN: SIGNIFICANT CHANGE UP
-  RIFAMPIN: SIGNIFICANT CHANGE UP
-  TETRACYCLINE: SIGNIFICANT CHANGE UP
-  TRIMETHOPRIM/SULFAMETHOXAZOLE: SIGNIFICANT CHANGE UP
-  VANCOMYCIN: SIGNIFICANT CHANGE UP
ANION GAP SERPL CALC-SCNC: 11 MMOL/L — SIGNIFICANT CHANGE UP (ref 5–17)
BUN SERPL-MCNC: 11.6 MG/DL — SIGNIFICANT CHANGE UP (ref 8–20)
CALCIUM SERPL-MCNC: 8.5 MG/DL — SIGNIFICANT CHANGE UP (ref 8.4–10.5)
CHLORIDE SERPL-SCNC: 91 MMOL/L — LOW (ref 96–108)
CO2 SERPL-SCNC: 26 MMOL/L — SIGNIFICANT CHANGE UP (ref 22–29)
CREAT SERPL-MCNC: 0.78 MG/DL — SIGNIFICANT CHANGE UP (ref 0.5–1.3)
EGFR: 103 ML/MIN/1.73M2 — SIGNIFICANT CHANGE UP
GLUCOSE BLDC GLUCOMTR-MCNC: 298 MG/DL — HIGH (ref 70–99)
GLUCOSE BLDC GLUCOMTR-MCNC: 333 MG/DL — HIGH (ref 70–99)
GLUCOSE BLDC GLUCOMTR-MCNC: 368 MG/DL — HIGH (ref 70–99)
GLUCOSE BLDC GLUCOMTR-MCNC: 376 MG/DL — HIGH (ref 70–99)
GLUCOSE BLDC GLUCOMTR-MCNC: 404 MG/DL — HIGH (ref 70–99)
GLUCOSE BLDC GLUCOMTR-MCNC: 437 MG/DL — HIGH (ref 70–99)
GLUCOSE SERPL-MCNC: 289 MG/DL — HIGH (ref 70–99)
HCT VFR BLD CALC: 38.5 % — LOW (ref 39–50)
HGB BLD-MCNC: 13.2 G/DL — SIGNIFICANT CHANGE UP (ref 13–17)
MCHC RBC-ENTMCNC: 31.4 PG — SIGNIFICANT CHANGE UP (ref 27–34)
MCHC RBC-ENTMCNC: 34.3 GM/DL — SIGNIFICANT CHANGE UP (ref 32–36)
MCV RBC AUTO: 91.7 FL — SIGNIFICANT CHANGE UP (ref 80–100)
METHOD TYPE: SIGNIFICANT CHANGE UP
PLATELET # BLD AUTO: 393 K/UL — SIGNIFICANT CHANGE UP (ref 150–400)
POTASSIUM SERPL-MCNC: 4.7 MMOL/L — SIGNIFICANT CHANGE UP (ref 3.5–5.3)
POTASSIUM SERPL-SCNC: 4.7 MMOL/L — SIGNIFICANT CHANGE UP (ref 3.5–5.3)
RBC # BLD: 4.2 M/UL — SIGNIFICANT CHANGE UP (ref 4.2–5.8)
RBC # FLD: 11.7 % — SIGNIFICANT CHANGE UP (ref 10.3–14.5)
SODIUM SERPL-SCNC: 128 MMOL/L — LOW (ref 135–145)
WBC # BLD: 10.54 K/UL — HIGH (ref 3.8–10.5)
WBC # FLD AUTO: 10.54 K/UL — HIGH (ref 3.8–10.5)

## 2024-01-22 PROCEDURE — 71045 X-RAY EXAM CHEST 1 VIEW: CPT | Mod: 26

## 2024-01-22 PROCEDURE — 99233 SBSQ HOSP IP/OBS HIGH 50: CPT

## 2024-01-22 PROCEDURE — 99223 1ST HOSP IP/OBS HIGH 75: CPT

## 2024-01-22 PROCEDURE — 99222 1ST HOSP IP/OBS MODERATE 55: CPT

## 2024-01-22 PROCEDURE — 99232 SBSQ HOSP IP/OBS MODERATE 35: CPT

## 2024-01-22 RX ORDER — INSULIN LISPRO 100/ML
12 VIAL (ML) SUBCUTANEOUS ONCE
Refills: 0 | Status: COMPLETED | OUTPATIENT
Start: 2024-01-22 | End: 2024-01-22

## 2024-01-22 RX ORDER — INSULIN LISPRO 100/ML
12 VIAL (ML) SUBCUTANEOUS
Refills: 0 | Status: DISCONTINUED | OUTPATIENT
Start: 2024-01-22 | End: 2024-01-23

## 2024-01-22 RX ORDER — INSULIN GLARGINE 100 [IU]/ML
30 INJECTION, SOLUTION SUBCUTANEOUS AT BEDTIME
Refills: 0 | Status: DISCONTINUED | OUTPATIENT
Start: 2024-01-22 | End: 2024-01-23

## 2024-01-22 RX ADMIN — Medication 3: at 22:31

## 2024-01-22 RX ADMIN — Medication 1 PATCH: at 19:00

## 2024-01-22 RX ADMIN — Medication 12 UNIT(S): at 13:19

## 2024-01-22 RX ADMIN — INSULIN GLARGINE 30 UNIT(S): 100 INJECTION, SOLUTION SUBCUTANEOUS at 22:30

## 2024-01-22 RX ADMIN — PIPERACILLIN AND TAZOBACTAM 25 GRAM(S): 4; .5 INJECTION, POWDER, LYOPHILIZED, FOR SOLUTION INTRAVENOUS at 17:51

## 2024-01-22 RX ADMIN — LISINOPRIL 10 MILLIGRAM(S): 2.5 TABLET ORAL at 06:20

## 2024-01-22 RX ADMIN — Medication 10 UNIT(S): at 09:39

## 2024-01-22 RX ADMIN — REMDESIVIR 200 MILLIGRAM(S): 5 INJECTION INTRAVENOUS at 08:27

## 2024-01-22 RX ADMIN — PIPERACILLIN AND TAZOBACTAM 25 GRAM(S): 4; .5 INJECTION, POWDER, LYOPHILIZED, FOR SOLUTION INTRAVENOUS at 09:46

## 2024-01-22 RX ADMIN — ENOXAPARIN SODIUM 40 MILLIGRAM(S): 100 INJECTION SUBCUTANEOUS at 22:30

## 2024-01-22 RX ADMIN — Medication 1 PATCH: at 13:04

## 2024-01-22 RX ADMIN — PIPERACILLIN AND TAZOBACTAM 25 GRAM(S): 4; .5 INJECTION, POWDER, LYOPHILIZED, FOR SOLUTION INTRAVENOUS at 01:50

## 2024-01-22 RX ADMIN — Medication 12 UNIT(S): at 17:57

## 2024-01-22 RX ADMIN — Medication 166.67 MILLIGRAM(S): at 08:27

## 2024-01-22 RX ADMIN — Medication 3: at 08:52

## 2024-01-22 RX ADMIN — Medication 5: at 18:00

## 2024-01-22 RX ADMIN — Medication 6: at 13:03

## 2024-01-22 NOTE — DIETITIAN INITIAL EVALUATION ADULT - OTHER INFO
57 y/o male with PMH of HTN, DM-2, active tobacco use was sent to the ED for abnormal outpatient CT chest. Patient reported pain on his left side x 4-5days associated with dry cough and shortness of breath. Sent for CT chest which showed pleural effusion and right sided lung nodules.

## 2024-01-22 NOTE — DIETITIAN INITIAL EVALUATION ADULT - NUTRITIONGOAL OUTCOME1
Provide adequate nutrients to improve nutrition status. Patient will continue to consume >75% of meals.

## 2024-01-22 NOTE — DIETITIAN INITIAL EVALUATION ADULT - ORAL INTAKE PTA/DIET HISTORY
Patient reports having a good appetite and excellent PO intake. States his appetite was good prior to admission as well with no significant changes in PO intake. No c/o N/V/C/D. States his UBW is around 190 lbs. RD bed scale weight 188 lbs. No edema noted and pt with no apparent signs of muscle/fat wasting. Pt with hx of type 2 DM. Labs reviewed - Glucose elevated and HgbA1c 14.4%. Educated pt verbally on consistent carbohydrate diet. Pt with no questions at this time and with good understanding. Made pt aware RD remains available if needed.

## 2024-01-22 NOTE — DIETITIAN INITIAL EVALUATION ADULT - ADD RECOMMEND
1) Continue diet as tolerated.   2) Rx: MVI daily.   3) Continue to monitor blood sugars and correct as needed.  4) Obtain daily weights to monitor trends.

## 2024-01-22 NOTE — PROGRESS NOTE ADULT - PROBLEM SELECTOR PLAN 1
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

## 2024-01-22 NOTE — PROGRESS NOTE ADULT - ASSESSMENT
58y  Male with h/o HTN, HLD, type 2 DM (on oral agents), active tobacco smoker (3/4PPD) with empyema s/p chest tube insertion. COVID +lico   - MSSA empyema   -COVID-19  will likely need further intervention for loculated effusion. consider TPA/Dornase through existing tube vs second IR guided biopsy or VATS.   -abx for ID  -remdesivir to complete 5 days, no steroids on RA.  -smoking cessation

## 2024-01-22 NOTE — CONSULT NOTE ADULT - SUBJECTIVE AND OBJECTIVE BOX
[facility]  INFECTIOUS DISEASES at [place] / [place] / [place]  =======================================================                               Raymond Dickinson MD#   Nat Ashraf MD*                             Holli Barba MD*   Wendi Rob MD*                              Professor Emeritus:  Dr Ranjith Alicia MD^            Diplomates American Board of Internal Medicine & Infectious Diseases                # [place] Office - Appt - Tel  [phone] Fax [phone]                * [place] Office - Appt - Tel [phone] Fax [phone]               ^ [place] Office - Appt - Tel  [phone] Fax [phone]                                  Hospital Consult line:  [phone]  =======================================================      [id number]  NINFA BLANCHARD    CC: Patient is a 58y old  Male who presents with a chief complaint of COVID, loculated effusion (2024 10:28)      58y  Male with h/o HTN, HLD, type 2 DM (on oral agents), active tobacco smoker (3/4PPD), was sent to the ED for abnormal outpatient CT chest. Patient reported pain on his left side x 4-5 days associated with dry cough and shortness of breath. Here he has been afebrile with leukocytosis to 12k. CT chest which showed pleural effusion and right sided lung nodules. Patient reported decrease appetite and weight loss (unintentional). He has no fever, night sweat, nausea, vomiting, abdominal pain, fall, trauma to the side, change in bowel/urinary habit, recent travel, sick contact. He was round to be COVID 19 positive. Started on Vancomycin, Zosyn. Pleural fluid with MSSA. ID input requested.       Past Medical & Surgical Hx:  Hypertension  DM (diabetes mellitus)  No significant past surgical history      Social Hx:  Active cigarette smoker       FAMILY HISTORY:  FH: diabetes mellitus (Mother)      Allergies  No Known Allergies       REVIEW OF SYSTEMS:  CONSTITUTIONAL:  No Fever or chills  HEENT:  No diplopia or blurred vision.  No earache, sore throat or runny nose.  CARDIOVASCULAR:  No chest pain  RESPIRATORY:  No cough,. + shortness of breath  GASTROINTESTINAL:  No nausea, vomiting or diarrhea.  GENITOURINARY:  No dysuria, frequency or urgency. No Blood in urine  MUSCULOSKELETAL:  no joint aches, no muscle pain  SKIN:  No change in skin, hair or nails.  NEUROLOGIC:  No Headaches    Physical Exam:  GEN: NAD  HEENT: normocephalic and atraumatic. EOMI. PERRL.    NECK: Supple.   LUNGS: Decreased basal BS B/L   HEART: RRR  ABDOMEN: Soft, NT, ND.  +BS.    : No CVA tenderness  EXTREMITIES: Without  edema.  MSK: No joint swelling  NEUROLOGIC: No Focal Deficits   PSYCHIATRIC: Appropriate affect .  SKIN: No rash      Vitals:  T(F): 98.5 (2024 08:22), Max: 100.1 (2024 22:50)  HR: 82 (2024 08:22)  BP: 120/79 (2024 08:22)  RR: 19 (2024 08:22)  SpO2: 97% (2024 08:22) (95% - 97%)  temp max in last 48H T(F): , Max: 100.1 (24 @ 22:50)    Current Antibiotics:  piperacillin/tazobactam IVPB.. 3.375 Gram(s) IV Intermittent every 8 hours  remdesivir  IVPB   IV Intermittent   remdesivir  IVPB 100 milliGRAM(s) IV Intermittent every 24 hours  vancomycin  IVPB 1250 milliGRAM(s) IV Intermittent every 12 hours    Other medications:  dextrose 5%. 1000 milliLiter(s) IV Continuous <Continuous>  dextrose 5%. 1000 milliLiter(s) IV Continuous <Continuous>  dextrose 50% Injectable 25 Gram(s) IV Push once  dextrose 50% Injectable 12.5 Gram(s) IV Push once  dextrose 50% Injectable 25 Gram(s) IV Push once  enoxaparin Injectable 40 milliGRAM(s) SubCutaneous every 24 hours  glucagon  Injectable 1 milliGRAM(s) IntraMuscular once  insulin glargine Injectable (LANTUS) 25 Unit(s) SubCutaneous at bedtime  insulin lispro (ADMELOG) corrective regimen sliding scale   SubCutaneous three times a day before meals  insulin lispro (ADMELOG) corrective regimen sliding scale   SubCutaneous at bedtime  insulin lispro Injectable (ADMELOG) 10 Unit(s) SubCutaneous three times a day before meals  lisinopril 10 milliGRAM(s) Oral daily  nicotine -  14 mG/24Hr(s) Patch 1 Patch Transdermal daily                            13.2   10.54 )-----------( 393      ( 2024 06:56 )             38.5         128<L>  |  91<L>  |  11.6  ----------------------------<  289<H>  4.7   |  26.0  |  0.78    Ca    8.5      2024 06:56      RECENT CULTURES:   @ 16:23 Pleural Fl Pleural Fluid Staphylococcus aureus    Numerous Staphylococcus aureus  polymorphonuclear leukocytes seen  Gram positive cocci in pairs seen  by cytocentrifuge     @ 07:50 Clean Catch Clean Catch (Midstream)     <10,000 CFU/mL Normal Urogenital Louise     @ 18:15 .Blood Blood     No growth at 72 Hours     @ 18:00 .Blood Blood     No growth at 72 Hours     @ 17:50    RVP  Detected  SARS-CoV-2: Detected      WBC Count: 10.54 K/uL (24 @ 06:56)  WBC Count: 12.76 K/uL (24 @ 13:05)  WBC Count: 11.71 K/uL (24 @ 02:37)  WBC Count: 11.99 K/uL (24 @ 18:15)    Creatinine: 0.78 mg/dL (24 @ 06:56)  Creatinine: 0.67 mg/dL (24 @ 13:05)  Creatinine: 0.71 mg/dL (24 @ 02:37)  Creatinine: 0.81 mg/dL (24 @ 18:15)    SARS-CoV-2: Detected (24 @ 17:50)    Lactate Dehydrogenase, Fluid (24 @ 16:23)    Lactate Dehydrogenase, Fluid: 7304        Urinalysis (24 @ 07:50)    pH Urine: 5.5   Glucose Qualitative, Urine: >=1000 mg/dL   Blood, Urine: Trace   Color: Yellow   Urine Appearance: Clear   Bilirubin: Negative   Ketone - Urine: 80 mg/dL   Specific Gravity: 1.021   Protein, Urine: Negative mg/dL   Urobilinogen: 0.2 mg/dL   Nitrite: Negative   Leukocyte Esterase Concentration: Small  Urine Microscopic-Add On (NC) (24 @ 07:50)    Epithelial Cells: 4 /HPF   Cast: 2 /LPF   Yeast-like Cells: Present   Red Blood Cell - Urine: 1 /HPF   White Blood Cell - Urine: 30 /HPF   Bacteria: Few /HPF          < from: CT Chest No Cont (24 @ 16:59) >  ACC: [id number] EXAM:  CT CHEST   ORDERED BY: SOFYA GALEANO     PROCEDURE DATE:  2024      INTERPRETATION:  .  Patient: NINFA BLANCHARD  : 1965  MRN: [id number]  ACC: [id number]  Order Date: 2024 4:59 PM  Exam: CT CHEST    INDICATION: f/u b/l hilar lymphadenopathy s/p pleural fluid drainage  TECHNIQUE: Unenhanced CT of the chest. Coronal, sagittal, and MIP images   were reconstructed and reviewed.  COMPARISON: 2024 chest CT.    FINDINGS:    AIRWAYS, LUNGS, PLEURA: Saber-sheath trachea. Patent central airways.   Interval insertion of right basilar pigtail catheter and decrease of   loculated left pleural effusion. Residual trapped fluid within the   fissure. Left basilar pleural thickening.    Stable 0.7 and 1 cm cavitary nodules within right upper and right lower   lobe.    LYMPH NODES, MEDIASTINUM: Stable borderline/mildly enlarged mediastinal   and hilar lymph nodes.    HEART, VESSELS: Heart size is normal. No pericardial effusion. Thoracic   aorta normal in diameter. Coronary and aortic valve leaflet   calcifications.    VISUALIZED UPPER ABDOMEN: Within normal limits.    CHEST WALL, BONES: No aggressive osseous lesion.    LOWER NECK: Within normal limits.    IMPRESSION:    Interval insertion of right basilar pigtail catheter and decrease of   loculated left pleural effusion. Residual trapped fluid within the   fissure. Left basilar pleural thickening.    Stable 0.7 and 1 cm cavitary nodules within right upper and right lower   lobe.    Stable borderline/mildly enlarged mediastinal and hilar lymph nodes.    --- End of Report ---    < end of copied text >          < from: CT Angio Chest PE Protocol w/ IV Cont (24 @ 21:07) >  ACC: [id number] EXAM:  CT ANGIO CHEST PULM ART [facility]   ORDERED BY: SCOTTY DAVIS     PROCEDURE DATE:  2024      INTERPRETATION:  Clinical information: Chest pain. Evaluate for pulmonary   embolus.    CT angiogram of the chest was obtained following administration of   intravenous contrast. Approximately 70 cc of Omnipaque 300 was   administered. Coronal, sagittal and MIP images were submitted for review.    Few small lymph nodes are present in the right paratracheal space, AP   window and thesubcarinal region.    Heart is normal in size. Calcification of the aortic valve and coronary   arteries is noted. No pericardial effusion is noted. Main pulmonary   artery measures 3.4 cm. No filling defects are noted.    No endobronchial lesions are noted. Two nodules demonstrating central   lucency/cavitation are noted in the right upper and right lower lobes.   The larger one is noted in the right lower lobe and measures 1 cm.   Compressive atelectasis is noted involving portion of the left lower   lobe. This is secondary to small to moderate-sized loculated left pleural   effusion.    Below the diaphragm, visualized portions of the abdomen demonstrate   cholelithiasis.    Degenerative changes of the spine are noted.    IMPRESSION: No pulmonary embolus is noted.    Two nodules demonstrating central lucency/cavitation are noted in the   right upper and right lower lobes. Exact etiology is unclear.    Small to moderate-sized loculated left pleural effusion.    --- End of Report ---    < end of copied text >        
PULMONARY CONSULT NOTE      [id number]    Patient is a 58y old  Male who presents with a chief complaint of     HISTORY OF PRESENT ILLNESS: Hx from chart and his daughter on the phone. He rarely if ever goes to doctors; goes to walk-ins when sick. He has some baseline KRUEGER over the past year. Last week, noted cough and pain in L chest. Went to walk-in and CXR showed effusion. CT chest done and advised he come to the ER. CT with nodules b/l; loc pl eff on L. Seen by T-surg in the ER. COVID +. Dtr reports no fever, no hemoptysis.     Smoker; has been for over 30 years.    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    MEDICATIONS  (STANDING):  dextrose 5%. 1000 milliLiter(s) (50 mL/Hr) IV Continuous <Continuous>  dextrose 5%. 1000 milliLiter(s) (100 mL/Hr) IV Continuous <Continuous>  dextrose 50% Injectable 25 Gram(s) IV Push once  dextrose 50% Injectable 12.5 Gram(s) IV Push once  dextrose 50% Injectable 25 Gram(s) IV Push once  enoxaparin Injectable 40 milliGRAM(s) SubCutaneous every 24 hours  glucagon  Injectable 1 milliGRAM(s) IntraMuscular once  insulin glargine Injectable (LANTUS) 15 Unit(s) SubCutaneous at bedtime  insulin lispro (ADMELOG) corrective regimen sliding scale   SubCutaneous three times a day before meals  insulin lispro (ADMELOG) corrective regimen sliding scale   SubCutaneous at bedtime  insulin lispro Injectable (ADMELOG) 5 Unit(s) SubCutaneous three times a day before meals  lisinopril 10 milliGRAM(s) Oral daily  nicotine -  14 mG/24Hr(s) Patch 1 Patch Transdermal daily  piperacillin/tazobactam IVPB.. 3.375 Gram(s) IV Intermittent every 8 hours  remdesivir  IVPB   IV Intermittent       MEDICATIONS  (PRN):  acetaminophen     Tablet .. 650 milliGRAM(s) Oral every 6 hours PRN Temp greater or equal to 38C (100.4F), Mild Pain (1 - 3)  aluminum hydroxide/magnesium hydroxide/simethicone Suspension 30 milliLiter(s) Oral every 4 hours PRN Dyspepsia  benzonatate 100 milliGRAM(s) Oral three times a day PRN Cough  dextrose Oral Gel 15 Gram(s) Oral once PRN Blood Glucose LESS THAN 70 milliGRAM(s)/deciliter  melatonin 3 milliGRAM(s) Oral at bedtime PRN Insomnia  ondansetron Injectable 4 milliGRAM(s) IV Push every 8 hours PRN Nausea and/or Vomiting      Allergies    No Known Allergies    Intolerances        PAST MEDICAL & SURGICAL HISTORY:  Hypertension      DM (diabetes mellitus)      No significant past surgical history          FAMILY HISTORY:  FH: diabetes mellitus (Mother)        SOCIAL HISTORY  Smoking History: smoker    REVIEW OF SYSTEMS:      ROS limited; asked me to talk to his daughter    Vital Signs Last 24 Hrs  T(C): 36.9 (19 Jan 2024 09:16), Max: 37.1 (19 Jan 2024 05:52)  T(F): 98.5 (19 Jan 2024 09:16), Max: 98.8 (19 Jan 2024 05:52)  HR: 87 (19 Jan 2024 09:16) (87 - 110)  BP: 126/82 (19 Jan 2024 09:16) (116/75 - 128/83)  BP(mean): --  RR: 18 (19 Jan 2024 09:16) (18 - 20)  SpO2: 93% (19 Jan 2024 09:16) (93% - 96%)    Parameters below as of 19 Jan 2024 09:16  Patient On (Oxygen Delivery Method): room air        PHYSICAL EXAMINATION:    GENERAL: The patient is  in no apparent distress.     HEENT: Head is normocephalic and atraumatic. Mucous membranes are moist.     NECK: Supple.     LUNGS: decreased bs L bases 1/3 up; rales b/l, respirations unlabored    HEART: Regular rate and rhythm      ABDOMEN: Soft, nontender, and nondistended.       EXTREMITIES: Without any cyanosis, clubbing, rash, lesions or edema.    NEUROLOGIC: Grossly intact.      LABS:                        13.4   11.71 )-----------( 407      ( 19 Jan 2024 02:37 )             40.0     01-19    129<L>  |  89<L>  |  10.5  ----------------------------<  314<H>  4.5   |  24.0  |  0.71    Ca    8.9      19 Jan 2024 02:37  Mg     1.6     01-18    TPro  8.5  /  Alb  3.4  /  TBili  0.3<L>  /  DBili  x   /  AST  17  /  ALT  35  /  AlkPhos  239<H>  01-18    PT/INR - ( 18 Jan 2024 18:15 )   PT: 12.4 sec;   INR: 1.12 ratio         PTT - ( 18 Jan 2024 18:15 )  PTT:33.1 sec                       MICROBIOLOGY:  SARS-CoV-2: Detected: This Respiratory Panel uses polymerase chain reaction (PCR) to detect for  adenovirus; coronavirus (HKU1, NL63, 229E, OC43); human metapneumovirus  (hMPV); human enterovirus/rhinovirus (Entero/RV); influenza A; influenza  A/H1; influenza A/H3; influenza A/H1-2009; influenza B; parainfluenza  viruses 1, 2, 3, 4; respiratory syncytial virus; Mycoplasma pneumoniae;  Chlamydophila pneumoniae; and SARS-CoV-2. (01.18.24 @ 17:50)        RADIOLOGY & ADDITIONAL STUDIES:  < from: CT Angio Chest PE Protocol w/ IV Cont (01.18.24 @ 21:07) >  ACC: [id number] EXAM:  CT ANGIO CHEST PULM ART [facility]   ORDERED BY: SCOTTY DAVIS     PROCEDURE DATE:  01/18/2024          INTERPRETATION:  Clinical information: Chest pain. Evaluate for pulmonary   embolus.    CT angiogram of the chest was obtained following administration of   intravenous contrast. Approximately 70 cc of Omnipaque 300 was   administered. Coronal, sagittal and MIP images were submitted for review.    Few small lymph nodes are present in the right paratracheal space, AP   window and thesubcarinal region.    Heart is normal in size. Calcification of the aortic valve and coronary   arteries is noted. No pericardial effusion is noted. Main pulmonary   artery measures 3.4 cm. No filling defects are noted.    No endobronchial lesions are noted. Two nodules demonstrating central   lucency/cavitation are noted in the right upper and right lower lobes.   The larger one is noted in the right lower lobe and measures 1 cm.   Compressive atelectasis is noted involving portion of the left lower   lobe. This is secondary to small to moderate-sized loculated left pleural   effusion.    Below the diaphragm, visualized portions of the abdomen demonstrate   cholelithiasis.    Degenerative changes of the spine are noted.    IMPRESSION: No pulmonary embolus is noted.    Two nodules demonstrating central lucency/cavitation are noted in the   right upper and right lower lobes. Exact etiology is unclear.    Small to moderate-sized loculated left pleural effusion.    --- End of Report ---            JYOTI QUINTANA MD; Attending Radiologist  This document has been electronically signed. Jan 19 2024  7:45AM    < end of copied text >  
HPI:  58 y.o. Male active smoker with PMH of DM, HTN who was sent to the ED for abnormal outpatient CT chest. Patient reported left sided CP associated with dry cough and shortness of breath. CT chest showed L. sided pleural effusion and right sided lung nodules. Patient reported decrease appetite and weight loss (unintentional). Denies fever or night sweats. COVID positive. Endocrinology consult requested for uncontrolled DM. Diagnosed ~ 6 months ago. Started on oral medication which he can not recall the name (home med list shows MF  mg daily). Self stopped it 1 month after starting with no clear reason and resume it 2 weeks ago. A1C 14%. Denies keeping any . Does not check SMBG. Does not have much knowledge of diabetes. FHx - ?mother with DM.     REVIEW OF SYSTEMS:  CONSTITUTIONAL: No fever, weight loss, or fatigue  EYES: No eye pain, visual disturbances, or discharge  ENMT:  No difficulty hearing, tinnitus, vertigo; No sinus or throat pain  NECK: No pain or stiffness  RESPIRATORY: No cough, wheezing, chills or hemoptysis; No shortness of breath  CARDIOVASCULAR: No chest pain, palpitations, dizziness, or leg swelling  GASTROINTESTINAL: No abdominal or epigastric pain. No nausea, vomiting, or hematemesis; No diarrhea or constipation. No melena or hematochezia.  NEUROLOGICAL: No headaches, memory loss, loss of strength, numbness, or tremors  SKIN: No itching, burning, rashes, or lesions   MUSCULOSKELETAL: No joint pain or swelling; No muscle, back, or extremity pain  PSYCHIATRIC: No depression, anxiety, mood swings, or difficulty sleeping    No Known Allergies    MEDICATIONS  (STANDING):  dextrose 5%. 1000 milliLiter(s) (50 mL/Hr) IV Continuous <Continuous>  dextrose 5%. 1000 milliLiter(s) (100 mL/Hr) IV Continuous <Continuous>  dextrose 50% Injectable 25 Gram(s) IV Push once  dextrose 50% Injectable 12.5 Gram(s) IV Push once  dextrose 50% Injectable 25 Gram(s) IV Push once  enoxaparin Injectable 40 milliGRAM(s) SubCutaneous every 24 hours  glucagon  Injectable 1 milliGRAM(s) IntraMuscular once  insulin glargine Injectable (LANTUS) 30 Unit(s) SubCutaneous at bedtime  insulin lispro (ADMELOG) corrective regimen sliding scale   SubCutaneous three times a day before meals  insulin lispro (ADMELOG) corrective regimen sliding scale   SubCutaneous at bedtime  insulin lispro Injectable (ADMELOG) 12 Unit(s) SubCutaneous three times a day before meals  lisinopril 10 milliGRAM(s) Oral daily  nicotine -  14 mG/24Hr(s) Patch 1 Patch Transdermal daily  piperacillin/tazobactam IVPB.. 3.375 Gram(s) IV Intermittent every 8 hours  remdesivir  IVPB   IV Intermittent   remdesivir  IVPB 100 milliGRAM(s) IV Intermittent every 24 hours    MEDICATIONS  (PRN):  acetaminophen     Tablet .. 650 milliGRAM(s) Oral every 6 hours PRN Temp greater or equal to 38C (100.4F), Mild Pain (1 - 3)  aluminum hydroxide/magnesium hydroxide/simethicone Suspension 30 milliLiter(s) Oral every 4 hours PRN Dyspepsia  benzonatate 100 milliGRAM(s) Oral three times a day PRN Cough  dextrose Oral Gel 15 Gram(s) Oral once PRN Blood Glucose LESS THAN 70 milliGRAM(s)/deciliter  melatonin 3 milliGRAM(s) Oral at bedtime PRN Insomnia  ondansetron Injectable 4 milliGRAM(s) IV Push every 8 hours PRN Nausea and/or Vomiting  oxyCODONE    IR 5 milliGRAM(s) Oral every 6 hours PRN Severe Pain (7 - 10)      Vital Signs Last 24 Hrs  T(C): 36.6 (2024 14:59), Max: 37.8 (2024 22:50)  T(F): 97.9 (2024 14:59), Max: 100.1 (2024 22:50)  HR: 93 (2024 14:59) (70 - 93)  BP: 109/65 (2024 14:59) (103/65 - 120/81)  BP(mean): --  RR: 18 (2024 14:59) (18 - 19)  SpO2: 98% (2024 14:59) (95% - 98%)    Parameters below as of 2024 08:22  Patient On (Oxygen Delivery Method): room air      Weight (kg): 80.6 (24 @ 16:22)    Physical Exam:    Constitutional: NAD, well-developed  HEENT: EOMI, no exophalmos  Neck: trachea midline, no thyroid enlargement  Respiratory: B/l air entry. Left sided chest tube in place  Cardiovascular: S1 and S2, RRR  Gastrointestinal: BS+, soft, ntnd  Extremities: No peripheral edema  Neurological: AOx3, no focal deficits  Psychiatric: Normal mood and normal affect  Skin: warm and dry    LABS      128<L>  |  91<L>  |  11.6  ----------------------------<  289<H>  4.7   |  26.0  |  0.78    Ca    8.5      2024 06:56                            13.2   10.54 )-----------( 393      ( 2024 06:56 )             38.5       A1C with Estimated Average Glucose Result: 14.4 % (24 @ 02:37)    CAPILLARY BLOOD GLUCOSE    POCT Blood Glucose.: 437 mg/dL (2024 13:02)  POCT Blood Glucose.: 404 mg/dL (2024 11:54)  POCT Blood Glucose.: 298 mg/dL (2024 08:04)  POCT Blood Glucose.: 310 mg/dL (2024 22:59)  POCT Blood Glucose.: 348 mg/dL (2024 21:19)  
  Thoracic Surgery Consult called for Left Pleural Effusion    HPI:   58 year old male with a PMHx of HTN, HLD, type 2 DM (on oral agents), active tobacco smoker (3/4PPD), was sent to the ED for abnormal outpatient CT chest. Patient reported pain on his left side x 4-5 days associated with dry cough and shortness of breath. Sent for CT chest which showed pleural effusion and right sided lung nodules. Patient reported decrease appetite and weight loss (unintentional). He has no fever, night sweat, nausea, vomiting, abdominal pain, fall, trauma to the side, change in bowel/urinary habit, recent travel, sick contact.     PAST MEDICAL & SURGICAL HISTORY:  Hypertension  DM (diabetes mellitus)  No significant past surgical history    Subjective: Patient lying on stretcher in the ER in NAD. Appears comfortable. SpO2 remains stable on room air.     REVIEW OF SYSTEMS:  General: +Unintentional weight loss. No HA/ Dizziness elicited  Skin: No Rashes/ Lesions elicited  Ophthalmologic: No change in vision elicited  ENMT: No change in Hearing elicited  Respiratory and Thorax: +Pleuritic pain in Left chest on inhalation. +Dry cough and SOB. No Wheezing/ Hemoptysis/ Sputum production elicited  Cardiovascular: No Chest pain/ Palpitations/ Diaphoresis	elicited  Gastrointestinal: No Nausea/ Vomiting/ diarrhea elicited	  Genitourinary: No Hematuria/ Dysuria elicited  Musculoskeletal: No Pain/ Weakness elicited	  Neurological: No Seizures/ TIA/ CVA elicited  Psychiatric: No Dementia/ SI/ HI elicited  Hematology/Lymphatics: No hx of bleeding/ Edema elicited	  Allergic/Immunologic: No Anaphylaxis/ Intolerance/ Recent illnesses elicited    MEDICATIONS  (STANDING):  enoxaparin Injectable 40 milliGRAM(s) SubCutaneous every 24 hours  insulin lispro (ADMELOG) corrective regimen sliding scale   SubCutaneous three times a day before meals  insulin lispro (ADMELOG) corrective regimen sliding scale   SubCutaneous at bedtime  lisinopril 10 milliGRAM(s) Oral daily  nicotine -  14 mG/24Hr(s) Patch 1 Patch Transdermal daily  piperacillin/tazobactam IVPB.. 3.375 Gram(s) IV Intermittent every 8 hours  remdesivir  IVPB 200 milliGRAM(s) IV Intermittent every 24 hours    MEDICATIONS  (PRN):  acetaminophen     Tablet .. 650 milliGRAM(s) Oral every 6 hours PRN Temp greater or equal to 38C (100.4F), Mild Pain (1 - 3)  aluminum hydroxide/magnesium hydroxide/simethicone Suspension 30 milliLiter(s) Oral every 4 hours PRN Dyspepsia  benzonatate 100 milliGRAM(s) Oral three times a day PRN Cough  dextrose Oral Gel 15 Gram(s) Oral once PRN Blood Glucose LESS THAN 70 milliGRAM(s)/deciliter  melatonin 3 milliGRAM(s) Oral at bedtime PRN Insomnia  ondansetron Injectable 4 milliGRAM(s) IV Push every 8 hours PRN Nausea and/or Vomiting    Allergies: No Known Allergies    SOCIAL HISTORY:  Lives with wife and daughter.   Independent for ADLs. Can climb stairs in home without difficulty.   Drives himself.   Current every day smoker (3/4PPD).  Denies any alcohol or illicit drug use.   From [place]. Received BCG vaccine in past.     FAMILY HISTORY:  FH: diabetes mellitus (Mother)    Vital Signs Last 24 Hrs  T(C): 37 (18 Jan 2024 16:22), Max: 37 (18 Jan 2024 16:22)  T(F): 98.6 (18 Jan 2024 16:22), Max: 98.6 (18 Jan 2024 16:22)  HR: 110 (18 Jan 2024 16:22) (110 - 110)  BP: 128/83 (18 Jan 2024 16:22) (128/83 - 128/83)  RR: 18 (18 Jan 2024 16:22) (18 - 18)  SpO2: 96% (18 Jan 2024 16:22) (96% - 96%)  Parameters below as of 18 Jan 2024 16:22  Patient On (Oxygen Delivery Method): room air    PHYSICAL EXAMINATION:  General: Lying flat in bed in NAD, SpO2 stable on room air  Neuro: A+O x 3, non-focal, speech clear and intact  HEENT:  NCAT,  No conjuctival edema or icterus, no thrush.   Neck: Supple, trachea midline,  Pulm: +Diminished BSs on Left, no accessory muscle use noted  CV: regular rate, regular rhythm, +S1S2  Abd: soft, NT, ND, + BS  Ext: SINGH x 4, no edema, no cyanosis, distal motor/neuro/circ intact  Skin: warm, dry, perfused    LABS:                        15.0   11.99 )-----------( 498      ( 18 Jan 2024 18:15 )             45.1     01-18    129<L>  |  84<L>  |  12.1  ----------------------------<  360<H>  5.0   |  22.0  |  0.81    Ca    9.6      18 Jan 2024 18:15  Mg     1.6     01-18    TPro  8.5  /  Alb  3.4  /  TBili  0.3<L>  /  DBili  x   /  AST  17  /  ALT  35  /  AlkPhos  239<H>  01-18    PT/INR - ( 18 Jan 2024 18:15 )   PT: 12.4 sec;   INR: 1.12 ratio      PTT - ( 18 Jan 2024 18:15 )  PTT:33.1 sec    Urinalysis Basic - ( 18 Jan 2024 18:15 )  Color: x / Appearance: x / SG: x / pH: x  Gluc: 360 mg/dL / Ketone: x  / Bili: x / Urobili: x   Blood: x / Protein: x / Nitrite: x   Leuk Esterase: x / RBC: x / WBC x   Sq Epi: x / Non Sq Epi: x / Bacteria: x    Respiratory Viral Panel with COVID-19 by ALANNA (01.18.24 @ 17:50)    Rapid RVP Result: Detected   SARS-CoV-2: Detected: This Respiratory Panel uses polymerase chain reaction (PCR) to detect for  adenovirus; coronavirus (HKU1, NL63, 229E, OC43); human metapneumovirus  (hMPV); human enterovirus/rhinovirus (Entero/RV); influenza A; influenza  A/H1; influenza A/H3; influenza A/H1-2009; influenza B; parainfluenza  viruses 1, 2, 3, 4; respiratory syncytial virus; Mycoplasma pneumoniae;  Chlamydophila pneumoniae; and SARS-CoV-2.   Adenovirus (RapRVP): NotDetec   Influenza A (RapRVP): NotDetec   Influenza AH1 2009 (RapRVP): NotDetec   Influenza AH1 (RapRVP): NotDetec   Influenza AH3 (RapRVP): NotDetec   Influenza B (RapRVP): NotDetec   Parainfluenza 1 (RapRVP): NotDetec   Parainfluenza 2 (RapRVP): NotDetec   Parainfluenza 3 (RapRVP): NotDetec   Parainfluenza 4 (RapRVP): NotDetec   Chlamydia pneumoniae (RapRVP): NotDetec   Mycoplasma pneumoniae (RapRVP): NotDetec   Entero/Rhinovirus (RapRVP): NotDetec   hMPV (RapRVP): NotDetec   Coronavirus (229E,HKU1,NL63,OC43): NotDetec    RADIOLOGY & ADDITIONAL STUDIES:    CTA Chest:  < from: CT Angio Chest PE Protocol w/ IV Cont (01.18.24 @ 21:07) >  IMPRESSION:  1.   There are 2 loculated pleural fluid collections, the larger of which is positioned in the posterior mid to lower left hemithorax, measuring 12 cm transverse by 11cm anteroposterior by 17 cm craniocaudal. There is no associated pleural/rim enhancement or gas within though this could still be an empyema. The 2nd loculated pleural fluid collection is smaller and is associated with the superior major fissure, measuring 10 cm in transverse dimension by 6 cm anteroposterior by 4 cm craniocaudal. There is no associated rim /pleural enhancement or internal gas within though this could still be a component of empyema. No priors for comparison.  2.   Mildly prominent bilateral hilar and mediastinal lymphadenopathy measuring up to 1.2 cm in short axis.  3.   There is a 1 cm irregular nodular consolidation density opacity in the lateral right lower lobe with central irregular round gas lucency. This could be bronchiolar dilatation or cavitation and this lesion might signify atypical pulmonary infection or cavitary neoplasm.  < end of copied text >

## 2024-01-22 NOTE — CONSULT NOTE ADULT - ASSESSMENT
58 y.o. Male active smoker with PMH of DM, HTN who was sent to the ED for abnormal outpatient CT chest. Patient reported left sided CP associated with dry cough and shortness of breath. CT chest showed L. sided pleural effusion and right sided lung nodules. Patient reported decrease appetite and weight loss (unintentional). Denies fever or night sweats. COVID positive. Endocrinology consult requested for uncontrolled DM. Diagnosed ~ 6 months ago. Started on oral medication which he can not recall the name (home med list shows MF  mg daily). Self stopped it 1 month after starting with no clear reason and resume it 2 weeks ago. A1C 14%. Denies keeping any . Does not check SMBG. Does not have much knowledge of diabetes. FHx - ?mother with DM.     Current in hospital regimen:  Lantus 25U qhs, will be increased tonight to 30U qhs  Admelog 12U TIDac  Low dose ISS    # Severely uncontrolled DM exacerbated by acute infection.  In the setting of poor compliance.   Patient does not have Type 2 phenotype.   Obtain C-peptid, PHILL and Islet Cell Ab.   Agree with increasing dose of basal and pre-meal insulin  Increase ISS to moderate dose  Monitor POC glucose qac and qhs with goal 120 - 180  Patient has poor diabetic understanding and needs education and insulin training     # Empyema  S/p Chest tube   Continue IV antimicrobials  Needs better DM control to promote healing    # COVID-19 infection  On Remdesevir.   Management as per primary team.   If steroids are planned will need insulin dose adjustment.      Endocrinology will follow.

## 2024-01-22 NOTE — PROGRESS NOTE ADULT - SUBJECTIVE AND OBJECTIVE BOX
PULMONARY PROGRESS NOTE      [id number]    Patient is a 58y old  Male who presents with a chief complaint of COVID, loculated effusion (20 Jan 2024 10:28)      INTERVAL HPI/OVERNIGHT EVENTS:  no acute events.  got a CT chest  denies any pain    MEDICATIONS  (STANDING):  dextrose 5%. 1000 milliLiter(s) (50 mL/Hr) IV Continuous <Continuous>  dextrose 5%. 1000 milliLiter(s) (100 mL/Hr) IV Continuous <Continuous>  dextrose 50% Injectable 25 Gram(s) IV Push once  dextrose 50% Injectable 12.5 Gram(s) IV Push once  dextrose 50% Injectable 25 Gram(s) IV Push once  enoxaparin Injectable 40 milliGRAM(s) SubCutaneous every 24 hours  glucagon  Injectable 1 milliGRAM(s) IntraMuscular once  insulin glargine Injectable (LANTUS) 30 Unit(s) SubCutaneous at bedtime  insulin lispro (ADMELOG) corrective regimen sliding scale   SubCutaneous three times a day before meals  insulin lispro (ADMELOG) corrective regimen sliding scale   SubCutaneous at bedtime  insulin lispro Injectable (ADMELOG) 12 Unit(s) SubCutaneous three times a day before meals  lisinopril 10 milliGRAM(s) Oral daily  nicotine -  14 mG/24Hr(s) Patch 1 Patch Transdermal daily  piperacillin/tazobactam IVPB.. 3.375 Gram(s) IV Intermittent every 8 hours  remdesivir  IVPB   IV Intermittent   remdesivir  IVPB 100 milliGRAM(s) IV Intermittent every 24 hours      MEDICATIONS  (PRN):  acetaminophen     Tablet .. 650 milliGRAM(s) Oral every 6 hours PRN Temp greater or equal to 38C (100.4F), Mild Pain (1 - 3)  aluminum hydroxide/magnesium hydroxide/simethicone Suspension 30 milliLiter(s) Oral every 4 hours PRN Dyspepsia  benzonatate 100 milliGRAM(s) Oral three times a day PRN Cough  dextrose Oral Gel 15 Gram(s) Oral once PRN Blood Glucose LESS THAN 70 milliGRAM(s)/deciliter  melatonin 3 milliGRAM(s) Oral at bedtime PRN Insomnia  ondansetron Injectable 4 milliGRAM(s) IV Push every 8 hours PRN Nausea and/or Vomiting  oxyCODONE    IR 5 milliGRAM(s) Oral every 6 hours PRN Severe Pain (7 - 10)      Allergies    No Known Allergies    Intolerances        PAST MEDICAL & SURGICAL HISTORY:  Hypertension      DM (diabetes mellitus)      No significant past surgical history          SOCIAL HISTORY  Smoking History:       REVIEW OF SYSTEMS:    CONSTITUTIONAL:  No distress    HEENT:  Eyes:  No diplopia or blurred vision. ENT:  No earache, sore throat or runny nose.    CARDIOVASCULAR:  No pressure, squeezing, tightness, heaviness or aching about the chest; no palpitations.    RESPIRATORY:  per HPI     GASTROINTESTINAL:  No nausea, vomiting or diarrhea.    GENITOURINARY:  No dysuria, frequency or urgency.    NEUROLOGIC:  No paresthesias, fasciculations, seizures or weakness.    PSYCHIATRIC:  No disorder of thought or mood.    Vital Signs Last 24 Hrs  T(C): 36.9 (22 Jan 2024 08:22), Max: 37.8 (21 Jan 2024 22:50)  T(F): 98.5 (22 Jan 2024 08:22), Max: 100.1 (21 Jan 2024 22:50)  HR: 82 (22 Jan 2024 08:22) (70 - 99)  BP: 120/79 (22 Jan 2024 08:22) (103/65 - 120/81)  BP(mean): --  RR: 19 (22 Jan 2024 08:22) (18 - 19)  SpO2: 97% (22 Jan 2024 08:22) (95% - 97%)    Parameters below as of 22 Jan 2024 08:22  Patient On (Oxygen Delivery Method): room air        PHYSICAL EXAMINATION:    GENERAL: The patient is awake and alert in no apparent distress.     HEENT: Head is normocephalic and atraumatic. Extraocular muscles are intact. Mucous membranes are moist.    NECK: Supple.    LUNGS: Clear to auscultation without wheezing, rales or rhonchi; respirations unlabored chest tube in place    HEART: Regular rate and rhythm without murmur.    ABDOMEN: Soft, nontender, and nondistended.      EXTREMITIES: Without any cyanosis, clubbing, rash, lesions or edema.    NEUROLOGIC: Grossly intact.    LABS:                        13.2   10.54 )-----------( 393      ( 22 Jan 2024 06:56 )             38.5     01-22    128<L>  |  91<L>  |  11.6  ----------------------------<  289<H>  4.7   |  26.0  |  0.78    Ca    8.5      22 Jan 2024 06:56        Urinalysis Basic - ( 22 Jan 2024 06:56 )    Color: x / Appearance: x / SG: x / pH: x  Gluc: 289 mg/dL / Ketone: x  / Bili: x / Urobili: x   Blood: x / Protein: x / Nitrite: x   Leuk Esterase: x / RBC: x / WBC x   Sq Epi: x / Non Sq Epi: x / Bacteria: x                      MICROBIOLOGY:    RADIOLOGY & ADDITIONAL STUDIES:  IMPRESSION:    Interval insertion of right basilar pigtail catheter and decrease of loculated left pleural effusion. Residual trapped fluid within the fissure. Left basilar pleural thickening.    Stable 0.7 and 1 cm cavitary nodules within right upper and right lower lobe.    Stable borderline/mildly enlarged mediastinal and hilar lymph nodes.

## 2024-01-22 NOTE — PROGRESS NOTE ADULT - ASSESSMENT
58 year old male with a PMHx of HTN, HLD, type 2 DM (on oral agents), active tobacco smoker (3/4PPD), with complaint of recent unintentional weight loss, dry cough, SOB, left sided pleuritic pain X 4-5 days, found with a loculated left sided pleural effusion, bilateral hilar and mediastinal lymphadenopathy, and a 1cm RLL irregular nodular consolidation with central round gas lucency on CTA chest. Thoracic Surgery called for consult. Patient also with + COVID. S/p insertion left PTC with purulent drainage. Pleural fluid culture + staph aureus

## 2024-01-22 NOTE — PROGRESS NOTE ADULT - ASSESSMENT
57 y/o male with PMH of HTN, DM-2, active tobacco use was sent to the ED for abnormal outpatient CT chest. Patient reported pain on his left side x 4-5days associated with dry cough and shortness of breath. Sent for CT chest which showed pleural effusion and right sided lung nodules. Patient reported decrease appetite and weight loss (unintentional). In the ED, CTA chest: 2 loculated pleural fluid collections on the left; mildly prominent bilateral hilar and mediastinal lymphadenopathy. 1cm irregular nodular consolidation either bronchiolar dilatation or cavitation likely infection vs cavitary neoplasm.     #Empyema   s/p chest tube 1/19  Fluid growing gram (+) cocci, MSSA  CTS following and managing Chest tube  ID consulted, cont Zosyn   , tele   F/u Pulm recs    #HTN   Lisinopril 10mg   Pt counseled on diet/lifestyle modifications     #Hyperglycemia with DM-2   A1c >14  Lantus increased to 30u qhs  Admelog increased to 12u qac  ISS, FSG qac/qhs  diabetic diet   Pt counseled on diet/lifestyle modifications   Diabetic educator. Patient will need to go home on insulin.     #Hypomagnesemia / hyponatremia due to pseudohyponatremia from hyperglycemia   na levels daily   monitor lytes    #COVID-19 infection   no hypoxia   C/w remdesevir   if hypoxic will start decadron   supportive treatment     dvt ppx: Lovenox

## 2024-01-22 NOTE — DIETITIAN INITIAL EVALUATION ADULT - PERTINENT LABORATORY DATA
01-22 Na128 mmol/L<L> Glu 289 mg/dL<H> K+ 4.7 mmol/L Cr  0.78 mg/dL BUN 11.6 mg/dL     POCT Blood Glucose.: 437 mg/dL (01-22-24 @ 13:02)  A1C with Estimated Average Glucose Result: 14.4 % (01-19-24 @ 02:37)

## 2024-01-22 NOTE — DIETITIAN INITIAL EVALUATION ADULT - PERTINENT MEDS FT
MEDICATIONS  (STANDING):  dextrose 5%. 1000 milliLiter(s) (50 mL/Hr) IV Continuous <Continuous>  dextrose 50% Injectable 25 Gram(s) IV Push once  enoxaparin Injectable 40 milliGRAM(s) SubCutaneous every 24 hours  glucagon  Injectable 1 milliGRAM(s) IntraMuscular once  insulin glargine Injectable (LANTUS) 30 Unit(s) SubCutaneous at bedtime  insulin lispro (ADMELOG) corrective regimen sliding scale   SubCutaneous three times a day before meals  lisinopril 10 milliGRAM(s) Oral daily  piperacillin/tazobactam IVPB.. 3.375 Gram(s) IV Intermittent every 8 hours

## 2024-01-22 NOTE — CONSULT NOTE ADULT - ASSESSMENT
58y  Male with h/o HTN, HLD, type 2 DM (on oral agents), active tobacco smoker (3/4PPD), was sent to the ED for abnormal outpatient CT chest. Patient reported pain on his left side x 4-5 days associated with dry cough and shortness of breath. Here he has been afebrile with leukocytosis to 12k. CT chest which showed pleural effusion and right sided lung nodules. Patient reported decrease appetite and weight loss (unintentional). He has no fever, night sweat, nausea, vomiting, abdominal pain, fall, trauma to the side, change in bowel/urinary habit, recent travel, sick contact. He was round to be COVID 19 positive. Started on Vancomycin, Zosyn. Pleural fluid with MSSA.      Antibiotics Course:  piperacillin/tazobactam 1/19 - present   remdesivir 1/19 - present   vancomycin  1/20 - present       Staphylococcus aureus empyema   Leukocytosis   Acute COVID 19  Active smoker       - Blood cultures 1/18 no growth   - Pleural Fluid culture 1/19 reporting Staphylococcus aureus  (MSSA)  - RVP/COVID 19 PCR 1/18 + SARS-CoV-2  - Urine Cx 1/19 not significant   - CT Chest 1/21 reporting decreased loculated left pleural effusion. cavitary nodules within right upper and right lower   lobe  - CTA Chest 1/18 reporting no PE and loculated left pleural effusion  - UA 1/19 not concerning for UTI  - LDH pleural fluid 7304  - Procalcitonin level ordered  - Continue Remdesivir x 5 days till 1/23/24   - Continue Zosyn  - D/C Vancomycin   - Thoracic surgery following, may benefit from VATS   - Hold off on PICC/Midline for now unless needed for reasons other than infectious diseases  - Follow up cultures  - Trend Fever  - Trend WBC      Thank you for allowing me to participate in the care of your patient.   Will Follow    Discussed treatment plan with: Dr Traore

## 2024-01-22 NOTE — PROGRESS NOTE ADULT - SUBJECTIVE AND OBJECTIVE BOX
BRIEF HOSPITAL COURSE  58 year old male with a PMHx of HTN, HLD, type 2 DM (on oral agents), active tobacco smoker (3/4PPD), with complaint of recent unintentional weight loss, dry cough, SOB, left sided pleuritic pain X 4-5 days, found with a loculated left sided pleural effusion, bilateral hilar and mediastinal lymphadenopathy, and a 1cm RLL irregular nodular consolidation with central round gas lucency on CTA chest. Thoracic Surgery called for consult. Patient also with + COVID. S/p insertion left PTC with purulent drainage. Pleural fluid culture + staph aureus    SIGNIFICANT RECENT/PAST 24 HR EVENTS  No acute events reported overnight.     SUBJECTIVE    ROS negative x 10 systems except as noted above.    PAST MEDICAL & SURGICAL HISTORY  Hypertension  DM (diabetes mellitus)  No significant past surgical history    DAILY REVIEW  Vitals   T(C): 36.9 (22 Jan 2024 08:22), Max: 37.8 (21 Jan 2024 22:50)  T(F): 98.5 (22 Jan 2024 08:22), Max: 100.1 (21 Jan 2024 22:50)  HR: 82 (22 Jan 2024 08:22) (70 - 99)  BP: 120/79 (22 Jan 2024 08:22) (103/65 - 120/81)  RR: 19 (22 Jan 2024 08:22) (18 - 19)  SpO2: 97% (22 Jan 2024 08:22) (95% - 97%)    O2 Parameters below as of 22 Jan 2024 08:22  Patient On (Oxygen Delivery Method): room air    I&O's Detail    21 Jan 2024 07:01  -  22 Jan 2024 07:00  --------------------------------------------------------  IN:  Total IN: 0 mL    OUT:    Chest Tube (mL): 60 mL  Total OUT: 60 mL    Total NET: -60 mL    Admit Wt: Drug Dosing Weight  Height (cm): 180.3 (18 Jan 2024 16:22)  Weight (kg): 80.6 (18 Jan 2024 16:22)  BMI (kg/m2): 24.8 (18 Jan 2024 16:22)  BSA (m2): 2.01 (18 Jan 2024 16:22)    LABS                        13.2   10.54 )-----------( 393      ( 22 Jan 2024 06:56 )             38.5     01-22    128<L>  |  91<L>  |  11.6  ----------------------------<  289<H>  4.7   |  26.0  |  0.78    Ca    8.5      22 Jan 2024 06:56    MEDICATIONS  MEDICATIONS  (STANDING):  dextrose 5%. 1000 milliLiter(s) (100 mL/Hr) IV Continuous <Continuous>  dextrose 5%. 1000 milliLiter(s) (50 mL/Hr) IV Continuous <Continuous>  dextrose 50% Injectable 25 Gram(s) IV Push once  dextrose 50% Injectable 12.5 Gram(s) IV Push once  dextrose 50% Injectable 25 Gram(s) IV Push once  enoxaparin Injectable 40 milliGRAM(s) SubCutaneous every 24 hours  glucagon  Injectable 1 milliGRAM(s) IntraMuscular once  insulin glargine Injectable (LANTUS) 25 Unit(s) SubCutaneous at bedtime  insulin lispro (ADMELOG) corrective regimen sliding scale   SubCutaneous three times a day before meals  insulin lispro (ADMELOG) corrective regimen sliding scale   SubCutaneous at bedtime  insulin lispro Injectable (ADMELOG) 10 Unit(s) SubCutaneous three times a day before meals  lisinopril 10 milliGRAM(s) Oral daily  nicotine -  14 mG/24Hr(s) Patch 1 Patch Transdermal daily  piperacillin/tazobactam IVPB.. 3.375 Gram(s) IV Intermittent every 8 hours  remdesivir  IVPB 100 milliGRAM(s) IV Intermittent every 24 hours  remdesivir  IVPB   IV Intermittent   vancomycin  IVPB 1250 milliGRAM(s) IV Intermittent every 12 hours    MEDICATIONS  (PRN):  acetaminophen     Tablet .. 650 milliGRAM(s) Oral every 6 hours PRN Temp greater or equal to 38C (100.4F), Mild Pain (1 - 3)  aluminum hydroxide/magnesium hydroxide/simethicone Suspension 30 milliLiter(s) Oral every 4 hours PRN Dyspepsia  benzonatate 100 milliGRAM(s) Oral three times a day PRN Cough  dextrose Oral Gel 15 Gram(s) Oral once PRN Blood Glucose LESS THAN 70 milliGRAM(s)/deciliter  melatonin 3 milliGRAM(s) Oral at bedtime PRN Insomnia  ondansetron Injectable 4 milliGRAM(s) IV Push every 8 hours PRN Nausea and/or Vomiting  oxyCODONE    IR 5 milliGRAM(s) Oral every 6 hours PRN Severe Pain (7 - 10)    ALLERGIES  No Known Allergies    DIAGNOSTICS  All relevant and available laboratory results, radiology and medications reviewed.  Xray Chest 1 View- PORTABLE-Routine (Xray Chest 1 View- PORTABLE-Routine in AM.) (01.20.24 @ 06:41)  Findings:  Impression: Status post left chest tube. The heart is unremarkable. Trace   left pleural effusion. No pneumothorax.    CT Angio Chest PE Protocol w/ IV Cont (01.18.24 @ 21:07)  CT angiogram of the chest was obtained following administration of   intravenous contrast. Approximately 70 cc of Omnipaque 300 was   administered. Coronal, sagittal and MIP images were submitted for review.    Few small lymph nodes are present in the right paratracheal space, AP   window and thesubcarinal region.    Heart is normal in size. Calcification of the aortic valve and coronary   arteries is noted. No pericardial effusion is noted. Main pulmonary   artery measures 3.4 cm. No filling defects are noted.    No endobronchial lesions are noted. Two nodules demonstrating central   lucency/cavitation are noted in the right upper and right lower lobes.   The larger one is noted in the right lower lobe and measures 1 cm.   Compressive atelectasis is noted involving portion of the left lower   lobe. This is secondary to small to moderate-sized loculated left pleural   effusion.    Below the diaphragm, visualized portions of the abdomen demonstrate   cholelithiasis.    Degenerative changes of the spine are noted.    IMPRESSION: No pulmonary embolus is noted.  Two nodules demonstrating central lucency/cavitation are noted in the   right upper and right lower lobes. Exact etiology is unclear.    Small to moderate-sized loculated left pleural effusion.    Xray Chest 2 Views PA/Lat (01.18.24 @ 17:41)  FINDINGS:  Single frontal view of the chest demonstrates moderate layering   left-sided pleural effusion. The cardiomediastinal silhouette is normal.   No acute osseous abnormalities. Please refer to the subsequent chest CT   for further details.    IMPRESSION: Moderate layering left-sided pleural effusion.    PHYSICAL EXAM  Constitutional: NAD, well developed  Neck: supple, trachea midline. No JVD   Respiratory: Breath sounds diminished left base, no accessory muscle use noted. No wheezing, rales, or rhonchi noted b/l   Cardiovascular: Regular rate, regular rhythm, normal S1, S2; no murmurs or rub   Gastrointestinal: Soft, non-tender, non-distended, + bowel sounds   Extremities: SINGH x 4, no peripheral edema, no cyanosis, no clubbing    Vascular: Equal and normal pulses: 2+ peripheral pulses throughout  Neurological: A+O x 3; speech clear and intact; no gross sensory/motor deficits  Psychiatric: calm, normal mood, normal affect   Skin: warm, dry, well perfused, no rashes   Tubes/Lines: Lt pleural PTC to sxn, purulent output, no obvious air leak noted   BRIEF HOSPITAL COURSE  58 year old male with a PMHx of HTN, HLD, type 2 DM (on oral agents), active tobacco smoker (3/4PPD), with complaint of recent unintentional weight loss, dry cough, SOB, left sided pleuritic pain X 4-5 days, found with a loculated left sided pleural effusion, bilateral hilar and mediastinal lymphadenopathy, and a 1cm RLL irregular nodular consolidation with central round gas lucency on CTA chest. Thoracic Surgery called for consult. Patient also with + COVID. S/p insertion left PTC with purulent drainage. Pleural fluid culture + staph aureus    SIGNIFICANT RECENT/PAST 24 HR EVENTS  No acute events reported overnight.     SUBJECTIVE  Patient seen and examined on follow up for left loculated effusion. Pt currently lying in bed in NAD. Denies fevers, chills, HA, dizziness, CP, SOB, abd pain, N/V/D, numbness/tingling in extremities, or any other acute complaints. States improvement in symptoms.  +Ambulating during day  +Using incentive as instructed  ROS negative x 10 systems except as noted above.    PAST MEDICAL & SURGICAL HISTORY  Hypertension  DM (diabetes mellitus)  No significant past surgical history    DAILY REVIEW  Vitals   T(C): 36.9 (22 Jan 2024 08:22), Max: 37.8 (21 Jan 2024 22:50)  T(F): 98.5 (22 Jan 2024 08:22), Max: 100.1 (21 Jan 2024 22:50)  HR: 82 (22 Jan 2024 08:22) (70 - 99)  BP: 120/79 (22 Jan 2024 08:22) (103/65 - 120/81)  RR: 19 (22 Jan 2024 08:22) (18 - 19)  SpO2: 97% (22 Jan 2024 08:22) (95% - 97%)    O2 Parameters below as of 22 Jan 2024 08:22  Patient On (Oxygen Delivery Method): room air    I&O's Detail    21 Jan 2024 07:01  -  22 Jan 2024 07:00  --------------------------------------------------------  IN:  Total IN: 0 mL    OUT:    Chest Tube (mL): 60 mL  Total OUT: 60 mL    Total NET: -60 mL    Admit Wt: Drug Dosing Weight  Height (cm): 180.3 (18 Jan 2024 16:22)  Weight (kg): 80.6 (18 Jan 2024 16:22)  BMI (kg/m2): 24.8 (18 Jan 2024 16:22)  BSA (m2): 2.01 (18 Jan 2024 16:22)    LABS                        13.2   10.54 )-----------( 393      ( 22 Jan 2024 06:56 )             38.5     01-22    128<L>  |  91<L>  |  11.6  ----------------------------<  289<H>  4.7   |  26.0  |  0.78    Ca    8.5      22 Jan 2024 06:56    MEDICATIONS  MEDICATIONS  (STANDING):  dextrose 5%. 1000 milliLiter(s) (100 mL/Hr) IV Continuous <Continuous>  dextrose 5%. 1000 milliLiter(s) (50 mL/Hr) IV Continuous <Continuous>  dextrose 50% Injectable 25 Gram(s) IV Push once  dextrose 50% Injectable 12.5 Gram(s) IV Push once  dextrose 50% Injectable 25 Gram(s) IV Push once  enoxaparin Injectable 40 milliGRAM(s) SubCutaneous every 24 hours  glucagon  Injectable 1 milliGRAM(s) IntraMuscular once  insulin glargine Injectable (LANTUS) 25 Unit(s) SubCutaneous at bedtime  insulin lispro (ADMELOG) corrective regimen sliding scale   SubCutaneous three times a day before meals  insulin lispro (ADMELOG) corrective regimen sliding scale   SubCutaneous at bedtime  insulin lispro Injectable (ADMELOG) 10 Unit(s) SubCutaneous three times a day before meals  lisinopril 10 milliGRAM(s) Oral daily  nicotine -  14 mG/24Hr(s) Patch 1 Patch Transdermal daily  piperacillin/tazobactam IVPB.. 3.375 Gram(s) IV Intermittent every 8 hours  remdesivir  IVPB 100 milliGRAM(s) IV Intermittent every 24 hours  remdesivir  IVPB   IV Intermittent   vancomycin  IVPB 1250 milliGRAM(s) IV Intermittent every 12 hours    MEDICATIONS  (PRN):  acetaminophen     Tablet .. 650 milliGRAM(s) Oral every 6 hours PRN Temp greater or equal to 38C (100.4F), Mild Pain (1 - 3)  aluminum hydroxide/magnesium hydroxide/simethicone Suspension 30 milliLiter(s) Oral every 4 hours PRN Dyspepsia  benzonatate 100 milliGRAM(s) Oral three times a day PRN Cough  dextrose Oral Gel 15 Gram(s) Oral once PRN Blood Glucose LESS THAN 70 milliGRAM(s)/deciliter  melatonin 3 milliGRAM(s) Oral at bedtime PRN Insomnia  ondansetron Injectable 4 milliGRAM(s) IV Push every 8 hours PRN Nausea and/or Vomiting  oxyCODONE    IR 5 milliGRAM(s) Oral every 6 hours PRN Severe Pain (7 - 10)    ALLERGIES  No Known Allergies    DIAGNOSTICS  All relevant and available laboratory results, radiology and medications reviewed.  CT Chest No Cont (01.21.24 @ 16:59)  FINDINGS:  AIRWAYS, LUNGS, PLEURA: Saber-sheath trachea. Patent central airways.   Interval insertion of right basilar pigtail catheter and decrease of   loculated left pleural effusion. Residual trapped fluid within the   fissure. Left basilar pleural thickening.    Stable 0.7 and 1 cm cavitary nodules within right upper and right lower   lobe.    LYMPH NODES, MEDIASTINUM: Stable borderline/mildly enlarged mediastinal   and hilar lymph nodes.    HEART, VESSELS: Heart size is normal. No pericardial effusion. Thoracic   aorta normal in diameter. Coronary and aortic valve leaflet   calcifications.    VISUALIZED UPPER ABDOMEN: Within normal limits.    CHEST WALL, BONES: No aggressive osseous lesion.    LOWER NECK: Within normal limits.    IMPRESSION:    Interval insertion of right basilar pigtail catheter and decrease of   loculated left pleural effusion. Residual trapped fluid within the   fissure. Left basilar pleural thickening.    Stable 0.7 and 1 cm cavitary nodules within right upper and right lower   lobe.    Stable borderline/mildly enlarged mediastinal and hilar lymph nodes.    Xray Chest 1 View- PORTABLE-Routine (Xray Chest 1 View- PORTABLE-Routine in AM.) (01.20.24 @ 06:41)  Findings:  Impression: Status post left chest tube. The heart is unremarkable. Trace   left pleural effusion. No pneumothorax.    CT Angio Chest PE Protocol w/ IV Cont (01.18.24 @ 21:07)  CT angiogram of the chest was obtained following administration of   intravenous contrast. Approximately 70 cc of Omnipaque 300 was   administered. Coronal, sagittal and MIP images were submitted for review.    Few small lymph nodes are present in the right paratracheal space, AP   window and thesubcarinal region.    Heart is normal in size. Calcification of the aortic valve and coronary   arteries is noted. No pericardial effusion is noted. Main pulmonary   artery measures 3.4 cm. No filling defects are noted.    No endobronchial lesions are noted. Two nodules demonstrating central   lucency/cavitation are noted in the right upper and right lower lobes.   The larger one is noted in the right lower lobe and measures 1 cm.   Compressive atelectasis is noted involving portion of the left lower   lobe. This is secondary to small to moderate-sized loculated left pleural   effusion.    Below the diaphragm, visualized portions of the abdomen demonstrate   cholelithiasis.    Degenerative changes of the spine are noted.    IMPRESSION: No pulmonary embolus is noted.  Two nodules demonstrating central lucency/cavitation are noted in the   right upper and right lower lobes. Exact etiology is unclear.    Small to moderate-sized loculated left pleural effusion.    Xray Chest 2 Views PA/Lat (01.18.24 @ 17:41)  FINDINGS:  Single frontal view of the chest demonstrates moderate layering   left-sided pleural effusion. The cardiomediastinal silhouette is normal.   No acute osseous abnormalities. Please refer to the subsequent chest CT   for further details.    IMPRESSION: Moderate layering left-sided pleural effusion.    PHYSICAL EXAM  Constitutional: NAD, well developed  Neck: supple, trachea midline. No JVD   Respiratory: Breath sounds diminished left base, no accessory muscle use noted. No wheezing, rales, or rhonchi noted b/l   Cardiovascular: Regular rate, regular rhythm, normal S1, S2; no murmurs or rub   Gastrointestinal: Soft, non-tender, non-distended, + bowel sounds   Extremities: SINGH x 4, no peripheral edema, no cyanosis, no clubbing    Vascular: Equal and normal pulses: 2+ peripheral pulses throughout  Neurological: A+O x 3; speech clear and intact; no gross sensory/motor deficits  Psychiatric: calm, normal mood, normal affect   Skin: warm, dry, well perfused, no rashes   Tubes/Lines: Lt pleural PTC to sxn, purulent output, no obvious air leak noted

## 2024-01-22 NOTE — PROGRESS NOTE ADULT - SUBJECTIVE AND OBJECTIVE BOX
[facility] Division of Hospital Medicine    Chief Complaint:  SOB    SUBJECTIVE / OVERNIGHT EVENTS: Patient seen and examined. NO shortness of breath. Chest tube in place    Patient denies chest pain, SOB, abd pain, N/V, fever, chills, dysuria or any other complaints. All remainder ROS negative.     MEDICATIONS  (STANDING):  dextrose 5%. 1000 milliLiter(s) (50 mL/Hr) IV Continuous <Continuous>  dextrose 5%. 1000 milliLiter(s) (100 mL/Hr) IV Continuous <Continuous>  dextrose 50% Injectable 25 Gram(s) IV Push once  dextrose 50% Injectable 12.5 Gram(s) IV Push once  dextrose 50% Injectable 25 Gram(s) IV Push once  enoxaparin Injectable 40 milliGRAM(s) SubCutaneous every 24 hours  glucagon  Injectable 1 milliGRAM(s) IntraMuscular once  insulin glargine Injectable (LANTUS) 30 Unit(s) SubCutaneous at bedtime  insulin lispro (ADMELOG) corrective regimen sliding scale   SubCutaneous three times a day before meals  insulin lispro (ADMELOG) corrective regimen sliding scale   SubCutaneous at bedtime  insulin lispro Injectable (ADMELOG) 12 Unit(s) SubCutaneous three times a day before meals  lisinopril 10 milliGRAM(s) Oral daily  nicotine -  14 mG/24Hr(s) Patch 1 Patch Transdermal daily  piperacillin/tazobactam IVPB.. 3.375 Gram(s) IV Intermittent every 8 hours  remdesivir  IVPB 100 milliGRAM(s) IV Intermittent every 24 hours  remdesivir  IVPB   IV Intermittent     MEDICATIONS  (PRN):  acetaminophen     Tablet .. 650 milliGRAM(s) Oral every 6 hours PRN Temp greater or equal to 38C (100.4F), Mild Pain (1 - 3)  aluminum hydroxide/magnesium hydroxide/simethicone Suspension 30 milliLiter(s) Oral every 4 hours PRN Dyspepsia  benzonatate 100 milliGRAM(s) Oral three times a day PRN Cough  dextrose Oral Gel 15 Gram(s) Oral once PRN Blood Glucose LESS THAN 70 milliGRAM(s)/deciliter  melatonin 3 milliGRAM(s) Oral at bedtime PRN Insomnia  ondansetron Injectable 4 milliGRAM(s) IV Push every 8 hours PRN Nausea and/or Vomiting  oxyCODONE    IR 5 milliGRAM(s) Oral every 6 hours PRN Severe Pain (7 - 10)        I&O's Summary    21 Jan 2024 07:01  -  22 Jan 2024 07:00  --------------------------------------------------------  IN: 0 mL / OUT: 60 mL / NET: -60 mL        PHYSICAL EXAM:  Vital Signs Last 24 Hrs  T(C): 36.9 (22 Jan 2024 08:22), Max: 37.8 (21 Jan 2024 22:50)  T(F): 98.5 (22 Jan 2024 08:22), Max: 100.1 (21 Jan 2024 22:50)  HR: 82 (22 Jan 2024 08:22) (70 - 99)  BP: 120/79 (22 Jan 2024 08:22) (103/65 - 120/81)  BP(mean): --  RR: 19 (22 Jan 2024 08:22) (18 - 19)  SpO2: 97% (22 Jan 2024 08:22) (95% - 97%)    Parameters below as of 22 Jan 2024 08:22  Patient On (Oxygen Delivery Method): room air            CONSTITUTIONAL: NAD  ENMT: Moist oral mucosa, no pharyngeal injection or exudates  RESPIRATORY: right chest tube. decrased breath sounds in the bases.   CARDIOVASCULAR: Regular rate and rhythm, normal S1 and S2,  No lower extremity edema;  ABDOMEN: Nontender to palpation, normoactive bowel sounds, no rebound/guarding;   MUSCLOSKELETAL:  no joint swelling or tenderness to palpation  PSYCH: A+O to person, place, and time; affect appropriate  NEUROLOGY: CN 2-12 are intact and symmetric; no gross sensory deficits;   SKIN: No rashes; no palpable lesions    LABS:                        13.2   10.54 )-----------( 393      ( 22 Jan 2024 06:56 )             38.5     01-22    128<L>  |  91<L>  |  11.6  ----------------------------<  289<H>  4.7   |  26.0  |  0.78    Ca    8.5      22 Jan 2024 06:56            Urinalysis Basic - ( 22 Jan 2024 06:56 )    Color: x / Appearance: x / SG: x / pH: x  Gluc: 289 mg/dL / Ketone: x  / Bili: x / Urobili: x   Blood: x / Protein: x / Nitrite: x   Leuk Esterase: x / RBC: x / WBC x   Sq Epi: x / Non Sq Epi: x / Bacteria: x        Culture - Fungal, Body Fluid (collected 19 Jan 2024 16:23)  Source: Pleural Fl Pleural Fluid  Preliminary Report (20 Jan 2024 07:56):    Testing in progress    Culture - Body Fluid with Gram Stain (collected 19 Jan 2024 16:23)  Source: Pleural Fl Pleural Fluid  Gram Stain (20 Jan 2024 01:43):    polymorphonuclear leukocytes seen    Gram positive cocci in pairs seen    by cytocentrifuge  Preliminary Report (21 Jan 2024 12:25):    Numerous Staphylococcus aureus  Organism: Staphylococcus aureus (22 Jan 2024 08:06)  Organism: Staphylococcus aureus (22 Jan 2024 08:06)      CAPILLARY BLOOD GLUCOSE      POCT Blood Glucose.: 437 mg/dL (22 Jan 2024 13:02)  POCT Blood Glucose.: 404 mg/dL (22 Jan 2024 11:54)  POCT Blood Glucose.: 298 mg/dL (22 Jan 2024 08:04)  POCT Blood Glucose.: 310 mg/dL (21 Jan 2024 22:59)  POCT Blood Glucose.: 348 mg/dL (21 Jan 2024 21:19)  POCT Blood Glucose.: 352 mg/dL (21 Jan 2024 16:28)        RADIOLOGY & ADDITIONAL TESTS:  Results Reviewed:   Imaging Personally Reviewed:  Electrocardiogram Personally Reviewed:

## 2024-01-23 DIAGNOSIS — E11.9 TYPE 2 DIABETES MELLITUS WITHOUT COMPLICATIONS: ICD-10-CM

## 2024-01-23 LAB
ALBUMIN SERPL ELPH-MCNC: 2.5 G/DL — LOW (ref 3.3–5.2)
ALP SERPL-CCNC: 109 U/L — SIGNIFICANT CHANGE UP (ref 40–120)
ALT FLD-CCNC: 22 U/L — SIGNIFICANT CHANGE UP
ANION GAP SERPL CALC-SCNC: 9 MMOL/L — SIGNIFICANT CHANGE UP (ref 5–17)
AST SERPL-CCNC: 21 U/L — SIGNIFICANT CHANGE UP
BASOPHILS # BLD AUTO: 0.05 K/UL — SIGNIFICANT CHANGE UP (ref 0–0.2)
BASOPHILS NFR BLD AUTO: 0.5 % — SIGNIFICANT CHANGE UP (ref 0–2)
BILIRUB SERPL-MCNC: 0.3 MG/DL — LOW (ref 0.4–2)
BUN SERPL-MCNC: 10.8 MG/DL — SIGNIFICANT CHANGE UP (ref 8–20)
CALCIUM SERPL-MCNC: 8.7 MG/DL — SIGNIFICANT CHANGE UP (ref 8.4–10.5)
CHLORIDE SERPL-SCNC: 94 MMOL/L — LOW (ref 96–108)
CO2 SERPL-SCNC: 28 MMOL/L — SIGNIFICANT CHANGE UP (ref 22–29)
CREAT SERPL-MCNC: 0.77 MG/DL — SIGNIFICANT CHANGE UP (ref 0.5–1.3)
EGFR: 103 ML/MIN/1.73M2 — SIGNIFICANT CHANGE UP
EOSINOPHIL # BLD AUTO: 0.19 K/UL — SIGNIFICANT CHANGE UP (ref 0–0.5)
EOSINOPHIL NFR BLD AUTO: 2 % — SIGNIFICANT CHANGE UP (ref 0–6)
GLUCOSE BLDC GLUCOMTR-MCNC: 140 MG/DL — HIGH (ref 70–99)
GLUCOSE BLDC GLUCOMTR-MCNC: 156 MG/DL — HIGH (ref 70–99)
GLUCOSE BLDC GLUCOMTR-MCNC: 273 MG/DL — HIGH (ref 70–99)
GLUCOSE BLDC GLUCOMTR-MCNC: 305 MG/DL — HIGH (ref 70–99)
GLUCOSE SERPL-MCNC: 274 MG/DL — HIGH (ref 70–99)
HCT VFR BLD CALC: 39.4 % — SIGNIFICANT CHANGE UP (ref 39–50)
HGB BLD-MCNC: 12.9 G/DL — LOW (ref 13–17)
IMM GRANULOCYTES NFR BLD AUTO: 1.1 % — HIGH (ref 0–0.9)
LYMPHOCYTES # BLD AUTO: 1.35 K/UL — SIGNIFICANT CHANGE UP (ref 1–3.3)
LYMPHOCYTES # BLD AUTO: 13.9 % — SIGNIFICANT CHANGE UP (ref 13–44)
MAGNESIUM SERPL-MCNC: 1.8 MG/DL — SIGNIFICANT CHANGE UP (ref 1.6–2.6)
MCHC RBC-ENTMCNC: 30 PG — SIGNIFICANT CHANGE UP (ref 27–34)
MCHC RBC-ENTMCNC: 32.7 GM/DL — SIGNIFICANT CHANGE UP (ref 32–36)
MCV RBC AUTO: 91.6 FL — SIGNIFICANT CHANGE UP (ref 80–100)
MONOCYTES # BLD AUTO: 1.07 K/UL — HIGH (ref 0–0.9)
MONOCYTES NFR BLD AUTO: 11 % — SIGNIFICANT CHANGE UP (ref 2–14)
NEUTROPHILS # BLD AUTO: 6.93 K/UL — SIGNIFICANT CHANGE UP (ref 1.8–7.4)
NEUTROPHILS NFR BLD AUTO: 71.5 % — SIGNIFICANT CHANGE UP (ref 43–77)
NON-GYNECOLOGICAL CYTOLOGY STUDY: SIGNIFICANT CHANGE UP
PLATELET # BLD AUTO: 426 K/UL — HIGH (ref 150–400)
POTASSIUM SERPL-MCNC: 4.6 MMOL/L — SIGNIFICANT CHANGE UP (ref 3.5–5.3)
POTASSIUM SERPL-SCNC: 4.6 MMOL/L — SIGNIFICANT CHANGE UP (ref 3.5–5.3)
PROT SERPL-MCNC: 7.3 G/DL — SIGNIFICANT CHANGE UP (ref 6.6–8.7)
RBC # BLD: 4.3 M/UL — SIGNIFICANT CHANGE UP (ref 4.2–5.8)
RBC # FLD: 11.8 % — SIGNIFICANT CHANGE UP (ref 10.3–14.5)
SODIUM SERPL-SCNC: 131 MMOL/L — LOW (ref 135–145)
WBC # BLD: 9.7 K/UL — SIGNIFICANT CHANGE UP (ref 3.8–10.5)
WBC # FLD AUTO: 9.7 K/UL — SIGNIFICANT CHANGE UP (ref 3.8–10.5)

## 2024-01-23 PROCEDURE — 71045 X-RAY EXAM CHEST 1 VIEW: CPT | Mod: 26

## 2024-01-23 PROCEDURE — 99233 SBSQ HOSP IP/OBS HIGH 50: CPT

## 2024-01-23 PROCEDURE — 99232 SBSQ HOSP IP/OBS MODERATE 35: CPT

## 2024-01-23 RX ORDER — INSULIN LISPRO 100/ML
16 VIAL (ML) SUBCUTANEOUS
Refills: 0 | Status: ACTIVE | OUTPATIENT
Start: 2024-01-23 | End: 2024-12-21

## 2024-01-23 RX ORDER — INSULIN LISPRO 100/ML
VIAL (ML) SUBCUTANEOUS AT BEDTIME
Refills: 0 | Status: ACTIVE | OUTPATIENT
Start: 2024-01-23 | End: 2024-12-21

## 2024-01-23 RX ORDER — INSULIN GLARGINE 100 [IU]/ML
35 INJECTION, SOLUTION SUBCUTANEOUS AT BEDTIME
Refills: 0 | Status: DISCONTINUED | OUTPATIENT
Start: 2024-01-23 | End: 2024-01-24

## 2024-01-23 RX ORDER — INSULIN LISPRO 100/ML
VIAL (ML) SUBCUTANEOUS
Refills: 0 | Status: ACTIVE | OUTPATIENT
Start: 2024-01-23 | End: 2024-12-21

## 2024-01-23 RX ADMIN — INSULIN GLARGINE 35 UNIT(S): 100 INJECTION, SOLUTION SUBCUTANEOUS at 21:42

## 2024-01-23 RX ADMIN — Medication 16 UNIT(S): at 12:49

## 2024-01-23 RX ADMIN — REMDESIVIR 200 MILLIGRAM(S): 5 INJECTION INTRAVENOUS at 09:00

## 2024-01-23 RX ADMIN — Medication 16 UNIT(S): at 17:38

## 2024-01-23 RX ADMIN — Medication 6: at 12:48

## 2024-01-23 RX ADMIN — Medication 1 PATCH: at 12:47

## 2024-01-23 RX ADMIN — ENOXAPARIN SODIUM 40 MILLIGRAM(S): 100 INJECTION SUBCUTANEOUS at 21:43

## 2024-01-23 RX ADMIN — PIPERACILLIN AND TAZOBACTAM 25 GRAM(S): 4; .5 INJECTION, POWDER, LYOPHILIZED, FOR SOLUTION INTRAVENOUS at 09:43

## 2024-01-23 RX ADMIN — LISINOPRIL 10 MILLIGRAM(S): 2.5 TABLET ORAL at 07:00

## 2024-01-23 RX ADMIN — Medication 12 UNIT(S): at 09:07

## 2024-01-23 RX ADMIN — PIPERACILLIN AND TAZOBACTAM 25 GRAM(S): 4; .5 INJECTION, POWDER, LYOPHILIZED, FOR SOLUTION INTRAVENOUS at 17:34

## 2024-01-23 RX ADMIN — Medication 1 PATCH: at 19:30

## 2024-01-23 RX ADMIN — PIPERACILLIN AND TAZOBACTAM 25 GRAM(S): 4; .5 INJECTION, POWDER, LYOPHILIZED, FOR SOLUTION INTRAVENOUS at 01:41

## 2024-01-23 NOTE — PROGRESS NOTE ADULT - PROBLEM SELECTOR PLAN 2
Pulmonary following - appreciate risk stratification for OR planning  No hypoxia  Continue Remdesivir & abx Pulmonary following - appreciate risk stratification for OR planning  No hypoxia  Completed course of Remdesivir

## 2024-01-23 NOTE — PROGRESS NOTE ADULT - ASSESSMENT
58y  Male with h/o HTN, HLD, type 2 DM (on oral agents), active tobacco smoker (3/4PPD), was sent to the ED for abnormal outpatient CT chest. Patient reported pain on his left side x 4-5 days associated with dry cough and shortness of breath. Here he has been afebrile with leukocytosis to 12k. CT chest which showed pleural effusion and right sided lung nodules. Patient reported decrease appetite and weight loss (unintentional). He has no fever, night sweat, nausea, vomiting, abdominal pain, fall, trauma to the side, change in bowel/urinary habit, recent travel, sick contact. He was round to be COVID 19 positive. Started on Vancomycin, Zosyn. Pleural fluid with MSSA.      Antibiotics Course:  piperacillin/tazobactam 1/19 - present   remdesivir 1/19 - present   vancomycin  1/20 - present       Staphylococcus aureus empyema   Leukocytosis   Acute COVID 19  Active smoker       - Blood cultures 1/18 no growth   - Pleural Fluid culture 1/19 reporting Staphylococcus aureus  (MSSA)  - RVP/COVID 19 PCR 1/18 + SARS-CoV-2  - Urine Cx 1/19 not significant   - CT Chest 1/21 reporting decreased loculated left pleural effusion. cavitary nodules within right upper and right lower   lobe  - CTA Chest 1/18 reporting no PE and loculated left pleural effusion  - UA 1/19 not concerning for UTI  - LDH pleural fluid 7304  - Procalcitonin level ordered  - Continue Remdesivir x 5 days till 1/23/24   - Continue  Zosyn  - Thoracic surgery following, may benefit from VATS, planned for OR pending medical clearance   - Hold off on PICC/Midline for now unless needed for reasons other than infectious diseases  - Follow up cultures  - Trend Fever  - Trend WBC      Will Follow    Discussed treatment plan with: Dr Traore

## 2024-01-23 NOTE — PROGRESS NOTE ADULT - ASSESSMENT
59 y/o male with PMH of HTN, DM-2, active tobacco use was sent to the ED for abnormal outpatient CT chest. Patient reported pain on his left side x 4-5days associated with dry cough and shortness of breath. Sent for CT chest which showed pleural effusion and right sided lung nodules. Patient reported decrease appetite and weight loss (unintentional). In the ED, CTA chest: 2 loculated pleural fluid collections on the left; mildly prominent bilateral hilar and mediastinal lymphadenopathy. 1cm irregular nodular consolidation either bronchiolar dilatation or cavitation likely infection vs cavitary neoplasm.     #Empyema   s/p chest tube 1/19  Fluid growing gram (+) cocci, MSSA  CTS following and managing Chest tube, Plan for VATS this week  ID consulted, cont Zosyn   , tele   F/u Pulm recs    #HTN   Lisinopril 10mg   Pt counseled on diet/lifestyle modifications     #Hyperglycemia with DM-2   #uncontrolled DM  A1c >14  Lantus increased to 35u qhs  Admelog increased to 16u qac  ISS, FSG qac/qhs  diabetic diet   Pt counseled on diet/lifestyle modifications   Diabetic educator. Patient will need to go home on insulin.     #Hypomagnesemia / hyponatremia due to pseudohyponatremia from hyperglycemia   na levels daily   monitor lytes    #COVID-19 infection   no hypoxia   C/w remdesevir   if hypoxic will start decadron   supportive treatment     dvt ppx: Lovenox    Patient is medically optimized for planned procedure. Class 2 risk. 6.0% 30-day risk of death, MI, or cardiac arrest

## 2024-01-23 NOTE — PROGRESS NOTE ADULT - PROBLEM SELECTOR PLAN 3
A1c 14.4 On metformin at home  Started on basal/bolus and ISS  Dosing adjustment based on glucose levels  Endocrine consult and diabetes education

## 2024-01-23 NOTE — ADVANCED PRACTICE NURSE CONSULT - ASSESSMENT
eddie to see pt in am pt is a+ox3 co 0 pain. pt states he does not know how long he has diabetes. he takes pills he does nt know which one he tested bg about a month ago does nt recall when and bg was about 180. pt was educated about the importance of using insulin at this time. pt verbalized understanding. he also educated about the importance of avoiding drinking juices, sugar free soda and water were preferred.

## 2024-01-23 NOTE — PROGRESS NOTE ADULT - SUBJECTIVE AND OBJECTIVE BOX
INTERVAL EVENTS:  Follow up diabetes management    ROS: Denies chest pain, sob, abd pain.    MEDICATIONS  (STANDING):  dextrose 5%. 1000 milliLiter(s) (100 mL/Hr) IV Continuous <Continuous>  dextrose 5%. 1000 milliLiter(s) (50 mL/Hr) IV Continuous <Continuous>  dextrose 50% Injectable 25 Gram(s) IV Push once  dextrose 50% Injectable 12.5 Gram(s) IV Push once  dextrose 50% Injectable 25 Gram(s) IV Push once  enoxaparin Injectable 40 milliGRAM(s) SubCutaneous every 24 hours  glucagon  Injectable 1 milliGRAM(s) IntraMuscular once  insulin glargine Injectable (LANTUS) 35 Unit(s) SubCutaneous at bedtime  insulin lispro (ADMELOG) corrective regimen sliding scale   SubCutaneous at bedtime  insulin lispro (ADMELOG) corrective regimen sliding scale   SubCutaneous three times a day before meals  insulin lispro Injectable (ADMELOG) 16 Unit(s) SubCutaneous three times a day before meals  lisinopril 10 milliGRAM(s) Oral daily  nicotine -  14 mG/24Hr(s) Patch 1 Patch Transdermal daily  piperacillin/tazobactam IVPB.. 3.375 Gram(s) IV Intermittent every 8 hours    MEDICATIONS  (PRN):  acetaminophen     Tablet .. 650 milliGRAM(s) Oral every 6 hours PRN Temp greater or equal to 38C (100.4F), Mild Pain (1 - 3)  aluminum hydroxide/magnesium hydroxide/simethicone Suspension 30 milliLiter(s) Oral every 4 hours PRN Dyspepsia  benzonatate 100 milliGRAM(s) Oral three times a day PRN Cough  dextrose Oral Gel 15 Gram(s) Oral once PRN Blood Glucose LESS THAN 70 milliGRAM(s)/deciliter  melatonin 3 milliGRAM(s) Oral at bedtime PRN Insomnia  ondansetron Injectable 4 milliGRAM(s) IV Push every 8 hours PRN Nausea and/or Vomiting  oxyCODONE    IR 5 milliGRAM(s) Oral every 6 hours PRN Severe Pain (7 - 10)    Allergies  No Known Allergies    Vital Signs Last 24 Hrs  T(C): 36.4 (23 Jan 2024 08:06), Max: 36.9 (22 Jan 2024 22:20)  T(F): 97.5 (23 Jan 2024 08:06), Max: 98.5 (22 Jan 2024 22:20)  HR: 80 (23 Jan 2024 08:06) (80 - 93)  BP: 126/79 (23 Jan 2024 08:06) (99/66 - 126/79)  BP(mean): --  RR: 17 (23 Jan 2024 08:06) (17 - 18)  SpO2: 94% (23 Jan 2024 08:06) (94% - 98%)    Parameters below as of 23 Jan 2024 08:06  Patient On (Oxygen Delivery Method): room air    PHYSICAL EXAM:  General: No apparent distress  Neck: Supple, trachea midline, no thyromegaly  Respiratory: Lungs clear, left chest tube  Cardiac: +S1, S2, no m/r/g  GI: +BS, soft, non tender, non distended  Extremities: No peripheral edema, no pedal lesions  Neuro: A+O X3, no tremor    LABS:                        12.9   9.70  )-----------( 426      ( 23 Jan 2024 06:56 )             39.4     01-23    131<L>  |  94<L>  |  10.8  ----------------------------<  274<H>  4.6   |  28.0  |  0.77    Ca    8.7      23 Jan 2024 06:56  Mg     1.8     01-23    TPro  7.3  /  Alb  2.5<L>  /  TBili  0.3<L>  /  DBili  x   /  AST  21  /  ALT  22  /  AlkPhos  109  01-23    Urinalysis Basic - ( 23 Jan 2024 06:56 )    Color: x / Appearance: x / SG: x / pH: x  Gluc: 274 mg/dL / Ketone: x  / Bili: x / Urobili: x   Blood: x / Protein: x / Nitrite: x   Leuk Esterase: x / RBC: x / WBC x   Sq Epi: x / Non Sq Epi: x / Bacteria: x    POCT Blood Glucose.: 305 mg/dL (01-23-24 @ 08:26)  POCT Blood Glucose.: 368 mg/dL (01-22-24 @ 21:41)  POCT Blood Glucose.: 376 mg/dL (01-22-24 @ 17:47)  POCT Blood Glucose.: 333 mg/dL (01-22-24 @ 17:18)  POCT Blood Glucose.: 437 mg/dL (01-22-24 @ 13:02)  POCT Blood Glucose.: 404 mg/dL (01-22-24 @ 11:54)

## 2024-01-23 NOTE — PROGRESS NOTE ADULT - ASSESSMENT
58 year old male with a PMHx of HTN, HLD, type 2 DM (on oral agents), active tobacco smoker (3/4PPD), with complaint of recent unintentional weight loss, dry cough, SOB, left sided pleuritic pain X 4-5 days, found with a loculated left sided pleural effusion, bilateral hilar and mediastinal lymphadenopathy, and a 1cm RLL irregular nodular consolidation with central round gas lucency on CTA chest. Thoracic Surgery called for consult. Patient also with + COVID. S/p insertion left PTC with purulent drainage. Pleural fluid culture + MSSA

## 2024-01-23 NOTE — ADVANCED PRACTICE NURSE CONSULT - RECOMMEDATIONS
continue diabetes self management education  pt to inject all insulin doses while in the hospital using prefilled insulin syringe and rn supervision  insulin pen teaching including pen setting testing dosing and injection  glucose meter teaching including hand hygiene and fs

## 2024-01-23 NOTE — PROGRESS NOTE ADULT - SUBJECTIVE AND OBJECTIVE BOX
[facility] Division of Hospital Medicine    Chief Complaint:  SOB    SUBJECTIVE / OVERNIGHT EVENTS: Patient seen and examined. States he has diabetes but was not on insulin at home. He denies chest pain and shortness of breath. Patient denies any cardiac history and reports he is very active and able to climb stairs and walk multiple blocks without chest pain or shortness if breath.     Patient denies chest pain, SOB, abd pain, N/V, fever, chills, dysuria or any other complaints. All remainder ROS negative.     MEDICATIONS  (STANDING):  dextrose 5%. 1000 milliLiter(s) (50 mL/Hr) IV Continuous <Continuous>  dextrose 5%. 1000 milliLiter(s) (100 mL/Hr) IV Continuous <Continuous>  dextrose 50% Injectable 25 Gram(s) IV Push once  dextrose 50% Injectable 12.5 Gram(s) IV Push once  dextrose 50% Injectable 25 Gram(s) IV Push once  enoxaparin Injectable 40 milliGRAM(s) SubCutaneous every 24 hours  glucagon  Injectable 1 milliGRAM(s) IntraMuscular once  insulin glargine Injectable (LANTUS) 35 Unit(s) SubCutaneous at bedtime  insulin lispro (ADMELOG) corrective regimen sliding scale   SubCutaneous at bedtime  insulin lispro (ADMELOG) corrective regimen sliding scale   SubCutaneous three times a day before meals  insulin lispro Injectable (ADMELOG) 16 Unit(s) SubCutaneous three times a day before meals  lisinopril 10 milliGRAM(s) Oral daily  nicotine -  14 mG/24Hr(s) Patch 1 Patch Transdermal daily  piperacillin/tazobactam IVPB.. 3.375 Gram(s) IV Intermittent every 8 hours    MEDICATIONS  (PRN):  acetaminophen     Tablet .. 650 milliGRAM(s) Oral every 6 hours PRN Temp greater or equal to 38C (100.4F), Mild Pain (1 - 3)  aluminum hydroxide/magnesium hydroxide/simethicone Suspension 30 milliLiter(s) Oral every 4 hours PRN Dyspepsia  benzonatate 100 milliGRAM(s) Oral three times a day PRN Cough  dextrose Oral Gel 15 Gram(s) Oral once PRN Blood Glucose LESS THAN 70 milliGRAM(s)/deciliter  melatonin 3 milliGRAM(s) Oral at bedtime PRN Insomnia  ondansetron Injectable 4 milliGRAM(s) IV Push every 8 hours PRN Nausea and/or Vomiting  oxyCODONE    IR 5 milliGRAM(s) Oral every 6 hours PRN Severe Pain (7 - 10)        I&O's Summary    22 Jan 2024 07:01  -  23 Jan 2024 07:00  --------------------------------------------------------  IN: 180 mL / OUT: 100 mL / NET: 80 mL        PHYSICAL EXAM:  Vital Signs Last 24 Hrs  T(C): 36.4 (23 Jan 2024 08:06), Max: 36.9 (22 Jan 2024 22:20)  T(F): 97.5 (23 Jan 2024 08:06), Max: 98.5 (22 Jan 2024 22:20)  HR: 80 (23 Jan 2024 08:06) (80 - 90)  BP: 126/79 (23 Jan 2024 08:06) (99/66 - 126/79)  BP(mean): --  RR: 17 (23 Jan 2024 08:06) (17 - 18)  SpO2: 94% (23 Jan 2024 08:06) (94% - 98%)    Parameters below as of 23 Jan 2024 08:06  Patient On (Oxygen Delivery Method): room air      CONSTITUTIONAL: NAD  ENMT: Moist oral mucosa, no pharyngeal injection or exudates  RESPIRATORY: right chest tube. decrased breath sounds in the bases.   CARDIOVASCULAR: Regular rate and rhythm, normal S1 and S2,  No lower extremity edema;  ABDOMEN: Nontender to palpation, normoactive bowel sounds, no rebound/guarding;   MUSCLOSKELETAL:  no joint swelling or tenderness to palpation  PSYCH: A+O to person, place, and time; affect appropriate  NEUROLOGY: CN 2-12 are intact and symmetric; no gross sensory deficits;   SKIN: No rashes; no palpable lesions    LABS:                        12.9   9.70  )-----------( 426      ( 23 Jan 2024 06:56 )             39.4     01-23    131<L>  |  94<L>  |  10.8  ----------------------------<  274<H>  4.6   |  28.0  |  0.77    Ca    8.7      23 Jan 2024 06:56  Mg     1.8     01-23    TPro  7.3  /  Alb  2.5<L>  /  TBili  0.3<L>  /  DBili  x   /  AST  21  /  ALT  22  /  AlkPhos  109  01-23          Urinalysis Basic - ( 23 Jan 2024 06:56 )    Color: x / Appearance: x / SG: x / pH: x  Gluc: 274 mg/dL / Ketone: x  / Bili: x / Urobili: x   Blood: x / Protein: x / Nitrite: x   Leuk Esterase: x / RBC: x / WBC x   Sq Epi: x / Non Sq Epi: x / Bacteria: x        CAPILLARY BLOOD GLUCOSE      POCT Blood Glucose.: 273 mg/dL (23 Jan 2024 12:01)  POCT Blood Glucose.: 305 mg/dL (23 Jan 2024 08:26)  POCT Blood Glucose.: 368 mg/dL (22 Jan 2024 21:41)  POCT Blood Glucose.: 376 mg/dL (22 Jan 2024 17:47)  POCT Blood Glucose.: 333 mg/dL (22 Jan 2024 17:18)        RADIOLOGY & ADDITIONAL TESTS:  Results Reviewed:   Imaging Personally Reviewed:  Electrocardiogram Personally Reviewed:

## 2024-01-23 NOTE — PROGRESS NOTE ADULT - SUBJECTIVE AND OBJECTIVE BOX
[facility]  INFECTIOUS DISEASES at [place] / [place] / [place]  =======================================================                               Raymond Dickinson MD#   Nat Ashraf MD*                             Holli Barba MD*   Wendi Rob MD*                              Professor Emeritus:  Dr Ranjith Alicia MD^            Diplomates American Board of Internal Medicine & Infectious Diseases                # [place] Office - Appt - Tel  [phone] Fax [phone]                * [place] Office - Appt - Tel [phone] Fax [phone]                      ^[place] Office - Tel  [phone] Fax [phone]                                  Hospital Consult line:  [phone]  =======================================================    NINFA BLANCHARD [id number]    Follow up: Staphylococcus aureus empyema     no complaints       Allergies:  No Known Allergies       REVIEW OF SYSTEMS:  CONSTITUTIONAL:  No Fever or chills  HEENT:  No diplopia or blurred vision.  No earache, sore throat or runny nose.  CARDIOVASCULAR:  No chest pain  RESPIRATORY:  No cough,. + shortness of breath  GASTROINTESTINAL:  No nausea, vomiting or diarrhea.  GENITOURINARY:  No dysuria, frequency or urgency. No Blood in urine  MUSCULOSKELETAL:  no joint aches, no muscle pain  SKIN:  No change in skin, hair or nails.  NEUROLOGIC:  No Headaches    Physical Exam:  GEN: NAD  HEENT: normocephalic and atraumatic. EOMI. PERRL.    NECK: Supple.   LUNGS: Decreased basal BS B/L   HEART: RRR  ABDOMEN: Soft, NT, ND.  +BS.    : No CVA tenderness  EXTREMITIES: Without  edema.  MSK: No joint swelling  NEUROLOGIC: No Focal Deficits   PSYCHIATRIC: Appropriate affect .  SKIN: No rash    Vitals:  T(F): 97.5 (23 Jan 2024 08:06), Max: 98.5 (22 Jan 2024 22:20)  HR: 80 (23 Jan 2024 08:06)  BP: 126/79 (23 Jan 2024 08:06)  RR: 17 (23 Jan 2024 08:06)  SpO2: 94% (23 Jan 2024 08:06) (94% - 98%)  temp max in last 48H T(F): , Max: 100.1 (01-21-24 @ 22:50)      Current Antibiotics:  piperacillin/tazobactam IVPB.. 3.375 Gram(s) IV Intermittent every 8 hours    Other medications:  dextrose 5%. 1000 milliLiter(s) IV Continuous <Continuous>  dextrose 5%. 1000 milliLiter(s) IV Continuous <Continuous>  dextrose 50% Injectable 25 Gram(s) IV Push once  dextrose 50% Injectable 12.5 Gram(s) IV Push once  dextrose 50% Injectable 25 Gram(s) IV Push once  enoxaparin Injectable 40 milliGRAM(s) SubCutaneous every 24 hours  glucagon  Injectable 1 milliGRAM(s) IntraMuscular once  insulin glargine Injectable (LANTUS) 35 Unit(s) SubCutaneous at bedtime  insulin lispro (ADMELOG) corrective regimen sliding scale   SubCutaneous at bedtime  insulin lispro (ADMELOG) corrective regimen sliding scale   SubCutaneous three times a day before meals  insulin lispro Injectable (ADMELOG) 16 Unit(s) SubCutaneous three times a day before meals  lisinopril 10 milliGRAM(s) Oral daily  nicotine -  14 mG/24Hr(s) Patch 1 Patch Transdermal daily                            12.9   9.70  )-----------( 426      ( 23 Jan 2024 06:56 )             39.4     01-23    131<L>  |  94<L>  |  10.8  ----------------------------<  274<H>  4.6   |  28.0  |  0.77    Ca    8.7      23 Jan 2024 06:56  Mg     1.8     01-23    TPro  7.3  /  Alb  2.5<L>  /  TBili  0.3<L>  /  DBili  x   /  AST  21  /  ALT  22  /  AlkPhos  109  01-23    RECENT CULTURES:  01-19 @ 16:23 Pleural Fl Pleural Fluid Staphylococcus aureus    Numerous Staphylococcus aureus  polymorphonuclear leukocytes seen  Gram positive cocci in pairs seen  by cytocentrifuge    01-19 @ 07:50 Clean Catch Clean Catch (Midstream)     <10,000 CFU/mL Normal Urogenital Louise    01-18 @ 18:15 .Blood Blood     No growth at 4 days    01-18 @ 18:00 .Blood Blood     No growth at 4 days    01-18 @ 17:50    RVP  Detected  SARS-CoV-2: Detected (01-18-24 @ 17:50)    WBC Count: 9.70 K/uL (01-23-24 @ 06:56)  WBC Count: 10.54 K/uL (01-22-24 @ 06:56)  WBC Count: 12.76 K/uL (01-21-24 @ 13:05)  WBC Count: 11.71 K/uL (01-19-24 @ 02:37)  WBC Count: 11.99 K/uL (01-18-24 @ 18:15)    Creatinine: 0.77 mg/dL (01-23-24 @ 06:56)  Creatinine: 0.78 mg/dL (01-22-24 @ 06:56)  Creatinine: 0.67 mg/dL (01-21-24 @ 13:05)  Creatinine: 0.71 mg/dL (01-19-24 @ 02:37)  Creatinine: 0.81 mg/dL (01-18-24 @ 18:15)

## 2024-01-23 NOTE — PROGRESS NOTE ADULT - SUBJECTIVE AND OBJECTIVE BOX
BRIEF HOSPITAL COURSE  58 year old male with a PMHx of HTN, HLD, type 2 DM (on oral agents), active tobacco smoker (3/4PPD), with complaint of recent unintentional weight loss, dry cough, SOB, left sided pleuritic pain X 4-5 days, found with a loculated left sided pleural effusion, bilateral hilar and mediastinal lymphadenopathy, and a 1cm RLL irregular nodular consolidation with central round gas lucency on CTA chest. Thoracic Surgery called for consult. Patient also with + COVID. S/p insertion left PTC with purulent drainage. Pleural fluid culture + MSSA    SIGNIFICANT RECENT/PAST 24 HR EVENTS  No acute events reported overnight.     SUBJECTIVE    ROS negative x 10 systems except as noted above.    PAST MEDICAL & SURGICAL HISTORY  Hypertension  DM (diabetes mellitus)  No significant past surgical history    DAILY REVIEW  Vitals   T(C): 36.8 (23 Jan 2024 05:20), Max: 36.9 (22 Jan 2024 08:22)  T(F): 98.3 (23 Jan 2024 05:20), Max: 98.5 (22 Jan 2024 08:22)  HR: 88 (23 Jan 2024 06:55) (82 - 93)  BP: 115/75 (23 Jan 2024 06:55) (99/66 - 120/79)  RR: 17 (23 Jan 2024 05:20) (17 - 19)  SpO2: 97% (23 Jan 2024 05:20) (97% - 98%)    O2 Parameters below as of 23 Jan 2024 05:20  Patient On (Oxygen Delivery Method): room air    I&O's Detail    22 Jan 2024 07:01  -  23 Jan 2024 07:00  --------------------------------------------------------  IN:    Oral Fluid: 180 mL  Total IN: 180 mL    OUT:    Chest Tube (mL): 100 mL  Total OUT: 100 mL    Total NET: 80 mL    Admit Wt: Drug Dosing Weight  Height (cm): 180.3 (18 Jan 2024 16:22)  Weight (kg): 80.6 (18 Jan 2024 16:22)  BMI (kg/m2): 24.8 (18 Jan 2024 16:22)  BSA (m2): 2.01 (18 Jan 2024 16:22)    LABS                        12.9   9.70  )-----------( 426      ( 23 Jan 2024 06:56 )             39.4     01-23    131<L>  |  94<L>  |  10.8  ----------------------------<  274<H>  4.6   |  28.0  |  0.77    Ca    8.7      23 Jan 2024 06:56  Mg     1.8     01-23    TPro  7.3  /  Alb  2.5<L>  /  TBili  0.3<L>  /  DBili  x   /  AST  21  /  ALT  22  /  AlkPhos  109  01-23    LIVER FUNCTIONS - ( 23 Jan 2024 06:56 )  Alb: 2.5 g/dL / Pro: 7.3 g/dL / ALK PHOS: 109 U/L / ALT: 22 U/L / AST: 21 U/L / GGT: x           MEDICATIONS  MEDICATIONS  (STANDING):  dextrose 5%. 1000 milliLiter(s) (100 mL/Hr) IV Continuous <Continuous>  dextrose 5%. 1000 milliLiter(s) (50 mL/Hr) IV Continuous <Continuous>  dextrose 50% Injectable 25 Gram(s) IV Push once  dextrose 50% Injectable 12.5 Gram(s) IV Push once  dextrose 50% Injectable 25 Gram(s) IV Push once  enoxaparin Injectable 40 milliGRAM(s) SubCutaneous every 24 hours  glucagon  Injectable 1 milliGRAM(s) IntraMuscular once  insulin glargine Injectable (LANTUS) 30 Unit(s) SubCutaneous at bedtime  insulin lispro Injectable (ADMELOG) 12 Unit(s) SubCutaneous three times a day before meals  lisinopril 10 milliGRAM(s) Oral daily  nicotine -  14 mG/24Hr(s) Patch 1 Patch Transdermal daily  piperacillin/tazobactam IVPB.. 3.375 Gram(s) IV Intermittent every 8 hours  remdesivir  IVPB   IV Intermittent   remdesivir  IVPB 100 milliGRAM(s) IV Intermittent every 24 hours    MEDICATIONS  (PRN):  acetaminophen     Tablet .. 650 milliGRAM(s) Oral every 6 hours PRN Temp greater or equal to 38C (100.4F), Mild Pain (1 - 3)  aluminum hydroxide/magnesium hydroxide/simethicone Suspension 30 milliLiter(s) Oral every 4 hours PRN Dyspepsia  benzonatate 100 milliGRAM(s) Oral three times a day PRN Cough  dextrose Oral Gel 15 Gram(s) Oral once PRN Blood Glucose LESS THAN 70 milliGRAM(s)/deciliter  melatonin 3 milliGRAM(s) Oral at bedtime PRN Insomnia  ondansetron Injectable 4 milliGRAM(s) IV Push every 8 hours PRN Nausea and/or Vomiting  oxyCODONE    IR 5 milliGRAM(s) Oral every 6 hours PRN Severe Pain (7 - 10)    ALLERGIES  No Known Allergies    DIAGNOSTICS  All relevant and available laboratory results, radiology and medications reviewed.  CT Chest No Cont (01.21.24 @ 16:59)  FINDINGS:  AIRWAYS, LUNGS, PLEURA: Saber-sheath trachea. Patent central airways.   Interval insertion of right basilar pigtail catheter and decrease of   loculated left pleural effusion. Residual trapped fluid within the   fissure. Left basilar pleural thickening.    Stable 0.7 and 1 cm cavitary nodules within right upper and right lower   lobe.    LYMPH NODES, MEDIASTINUM: Stable borderline/mildly enlarged mediastinal   and hilar lymph nodes.    HEART, VESSELS: Heart size is normal. No pericardial effusion. Thoracic   aorta normal in diameter. Coronary and aortic valve leaflet   calcifications.    VISUALIZED UPPER ABDOMEN: Within normal limits.    CHEST WALL, BONES: No aggressive osseous lesion.    LOWER NECK: Within normal limits.    IMPRESSION:    Interval insertion of right basilar pigtail catheter and decrease of   loculated left pleural effusion. Residual trapped fluid within the   fissure. Left basilar pleural thickening.    Stable 0.7 and 1 cm cavitary nodules within right upper and right lower   lobe.    Stable borderline/mildly enlarged mediastinal and hilar lymph nodes.    Xray Chest 1 View- PORTABLE-Routine (Xray Chest 1 View- PORTABLE-Routine in AM.) (01.20.24 @ 06:41)  Findings:  Impression: Status post left chest tube. The heart is unremarkable. Trace   left pleural effusion. No pneumothorax.    CT Angio Chest PE Protocol w/ IV Cont (01.18.24 @ 21:07)  CT angiogram of the chest was obtained following administration of   intravenous contrast. Approximately 70 cc of Omnipaque 300 was   administered. Coronal, sagittal and MIP images were submitted for review.    Few small lymph nodes are present in the right paratracheal space, AP   window and thesubcarinal region.    Heart is normal in size. Calcification of the aortic valve and coronary   arteries is noted. No pericardial effusion is noted. Main pulmonary   artery measures 3.4 cm. No filling defects are noted.    No endobronchial lesions are noted. Two nodules demonstrating central   lucency/cavitation are noted in the right upper and right lower lobes.   The larger one is noted in the right lower lobe and measures 1 cm.   Compressive atelectasis is noted involving portion of the left lower   lobe. This is secondary to small to moderate-sized loculated left pleural   effusion.    Below the diaphragm, visualized portions of the abdomen demonstrate   cholelithiasis.    Degenerative changes of the spine are noted.    IMPRESSION: No pulmonary embolus is noted.  Two nodules demonstrating central lucency/cavitation are noted in the   right upper and right lower lobes. Exact etiology is unclear.    Small to moderate-sized loculated left pleural effusion.    Xray Chest 2 Views PA/Lat (01.18.24 @ 17:41)  FINDINGS:  Single frontal view of the chest demonstrates moderate layering   left-sided pleural effusion. The cardiomediastinal silhouette is normal.   No acute osseous abnormalities. Please refer to the subsequent chest CT   for further details.    IMPRESSION: Moderate layering left-sided pleural effusion.      PHYSICAL EXAM  Constitutional: NAD, well developed  Neck: supple, trachea midline. No JVD   Respiratory: Breath sounds diminished left base, no accessory muscle use noted. No wheezing, rales, or rhonchi noted b/l   Cardiovascular: Regular rate, regular rhythm, normal S1, S2; no murmurs or rub   Gastrointestinal: Soft, non-tender, non-distended, + bowel sounds   Extremities: SINGH x 4, no peripheral edema, no cyanosis, no clubbing    Vascular: Equal and normal pulses: 2+ peripheral pulses throughout  Neurological: A+O x 3; speech clear and intact; no gross sensory/motor deficits  Psychiatric: calm, normal mood, normal affect   Skin: warm, dry, well perfused, no rashes   Tubes/Lines: Lt pleural PTC to sxn, purulent output, no obvious air leak noted       BRIEF HOSPITAL COURSE  58 year old male with a PMHx of HTN, HLD, type 2 DM (on oral agents), active tobacco smoker (3/4PPD), with complaint of recent unintentional weight loss, dry cough, SOB, left sided pleuritic pain X 4-5 days, found with a loculated left sided pleural effusion, bilateral hilar and mediastinal lymphadenopathy, and a 1cm RLL irregular nodular consolidation with central round gas lucency on CTA chest. Thoracic Surgery called for consult. Patient also with + COVID. S/p insertion left PTC with purulent drainage. Pleural fluid culture + MSSA    SIGNIFICANT RECENT/PAST 24 HR EVENTS  No acute events reported overnight.     SUBJECTIVE  Patient seen and examined on follow up for left loculated effusion. Pt currently lying in bed in NAD. Denies fevers, chills, HA, dizziness, CP, SOB, abd pain, N/V/D, numbness/tingling in extremities, or any other acute complaints. States improvement in symptoms.  +Ambulating during day  +Using incentive as instructed  ROS negative x 10 systems except as noted above.    PAST MEDICAL & SURGICAL HISTORY  Hypertension  DM (diabetes mellitus)  No significant past surgical history    DAILY REVIEW  Vitals   T(C): 36.8 (23 Jan 2024 05:20), Max: 36.9 (22 Jan 2024 08:22)  T(F): 98.3 (23 Jan 2024 05:20), Max: 98.5 (22 Jan 2024 08:22)  HR: 88 (23 Jan 2024 06:55) (82 - 93)  BP: 115/75 (23 Jan 2024 06:55) (99/66 - 120/79)  RR: 17 (23 Jan 2024 05:20) (17 - 19)  SpO2: 97% (23 Jan 2024 05:20) (97% - 98%)    O2 Parameters below as of 23 Jan 2024 05:20  Patient On (Oxygen Delivery Method): room air    I&O's Detail    22 Jan 2024 07:01  -  23 Jan 2024 07:00  --------------------------------------------------------  IN:    Oral Fluid: 180 mL  Total IN: 180 mL    OUT:    Chest Tube (mL): 100 mL  Total OUT: 100 mL    Total NET: 80 mL    Admit Wt: Drug Dosing Weight  Height (cm): 180.3 (18 Jan 2024 16:22)  Weight (kg): 80.6 (18 Jan 2024 16:22)  BMI (kg/m2): 24.8 (18 Jan 2024 16:22)  BSA (m2): 2.01 (18 Jan 2024 16:22)    LABS                        12.9   9.70  )-----------( 426      ( 23 Jan 2024 06:56 )             39.4     01-23    131<L>  |  94<L>  |  10.8  ----------------------------<  274<H>  4.6   |  28.0  |  0.77    Ca    8.7      23 Jan 2024 06:56  Mg     1.8     01-23    TPro  7.3  /  Alb  2.5<L>  /  TBili  0.3<L>  /  DBili  x   /  AST  21  /  ALT  22  /  AlkPhos  109  01-23    LIVER FUNCTIONS - ( 23 Jan 2024 06:56 )  Alb: 2.5 g/dL / Pro: 7.3 g/dL / ALK PHOS: 109 U/L / ALT: 22 U/L / AST: 21 U/L / GGT: x           MEDICATIONS  MEDICATIONS  (STANDING):  dextrose 5%. 1000 milliLiter(s) (100 mL/Hr) IV Continuous <Continuous>  dextrose 5%. 1000 milliLiter(s) (50 mL/Hr) IV Continuous <Continuous>  dextrose 50% Injectable 25 Gram(s) IV Push once  dextrose 50% Injectable 12.5 Gram(s) IV Push once  dextrose 50% Injectable 25 Gram(s) IV Push once  enoxaparin Injectable 40 milliGRAM(s) SubCutaneous every 24 hours  glucagon  Injectable 1 milliGRAM(s) IntraMuscular once  insulin glargine Injectable (LANTUS) 30 Unit(s) SubCutaneous at bedtime  insulin lispro Injectable (ADMELOG) 12 Unit(s) SubCutaneous three times a day before meals  lisinopril 10 milliGRAM(s) Oral daily  nicotine -  14 mG/24Hr(s) Patch 1 Patch Transdermal daily  piperacillin/tazobactam IVPB.. 3.375 Gram(s) IV Intermittent every 8 hours  remdesivir  IVPB   IV Intermittent   remdesivir  IVPB 100 milliGRAM(s) IV Intermittent every 24 hours    MEDICATIONS  (PRN):  acetaminophen     Tablet .. 650 milliGRAM(s) Oral every 6 hours PRN Temp greater or equal to 38C (100.4F), Mild Pain (1 - 3)  aluminum hydroxide/magnesium hydroxide/simethicone Suspension 30 milliLiter(s) Oral every 4 hours PRN Dyspepsia  benzonatate 100 milliGRAM(s) Oral three times a day PRN Cough  dextrose Oral Gel 15 Gram(s) Oral once PRN Blood Glucose LESS THAN 70 milliGRAM(s)/deciliter  melatonin 3 milliGRAM(s) Oral at bedtime PRN Insomnia  ondansetron Injectable 4 milliGRAM(s) IV Push every 8 hours PRN Nausea and/or Vomiting  oxyCODONE    IR 5 milliGRAM(s) Oral every 6 hours PRN Severe Pain (7 - 10)    ALLERGIES  No Known Allergies    DIAGNOSTICS  All relevant and available laboratory results, radiology and medications reviewed.  CT Chest No Cont (01.21.24 @ 16:59)  FINDINGS:  AIRWAYS, LUNGS, PLEURA: Saber-sheath trachea. Patent central airways.   Interval insertion of right basilar pigtail catheter and decrease of   loculated left pleural effusion. Residual trapped fluid within the   fissure. Left basilar pleural thickening.    Stable 0.7 and 1 cm cavitary nodules within right upper and right lower   lobe.    LYMPH NODES, MEDIASTINUM: Stable borderline/mildly enlarged mediastinal   and hilar lymph nodes.    HEART, VESSELS: Heart size is normal. No pericardial effusion. Thoracic   aorta normal in diameter. Coronary and aortic valve leaflet   calcifications.    VISUALIZED UPPER ABDOMEN: Within normal limits.    CHEST WALL, BONES: No aggressive osseous lesion.    LOWER NECK: Within normal limits.    IMPRESSION:    Interval insertion of right basilar pigtail catheter and decrease of   loculated left pleural effusion. Residual trapped fluid within the   fissure. Left basilar pleural thickening.    Stable 0.7 and 1 cm cavitary nodules within right upper and right lower   lobe.    Stable borderline/mildly enlarged mediastinal and hilar lymph nodes.    Xray Chest 1 View- PORTABLE-Routine (Xray Chest 1 View- PORTABLE-Routine in AM.) (01.20.24 @ 06:41)  Findings:  Impression: Status post left chest tube. The heart is unremarkable. Trace   left pleural effusion. No pneumothorax.    CT Angio Chest PE Protocol w/ IV Cont (01.18.24 @ 21:07)  CT angiogram of the chest was obtained following administration of   intravenous contrast. Approximately 70 cc of Omnipaque 300 was   administered. Coronal, sagittal and MIP images were submitted for review.    Few small lymph nodes are present in the right paratracheal space, AP   window and thesubcarinal region.    Heart is normal in size. Calcification of the aortic valve and coronary   arteries is noted. No pericardial effusion is noted. Main pulmonary   artery measures 3.4 cm. No filling defects are noted.    No endobronchial lesions are noted. Two nodules demonstrating central   lucency/cavitation are noted in the right upper and right lower lobes.   The larger one is noted in the right lower lobe and measures 1 cm.   Compressive atelectasis is noted involving portion of the left lower   lobe. This is secondary to small to moderate-sized loculated left pleural   effusion.    Below the diaphragm, visualized portions of the abdomen demonstrate   cholelithiasis.    Degenerative changes of the spine are noted.    IMPRESSION: No pulmonary embolus is noted.  Two nodules demonstrating central lucency/cavitation are noted in the   right upper and right lower lobes. Exact etiology is unclear.    Small to moderate-sized loculated left pleural effusion.    Xray Chest 2 Views PA/Lat (01.18.24 @ 17:41)  FINDINGS:  Single frontal view of the chest demonstrates moderate layering   left-sided pleural effusion. The cardiomediastinal silhouette is normal.   No acute osseous abnormalities. Please refer to the subsequent chest CT   for further details.    IMPRESSION: Moderate layering left-sided pleural effusion.      PHYSICAL EXAM  Constitutional: NAD, well developed  Neck: supple, trachea midline. No JVD   Respiratory: Breath sounds diminished left base, no accessory muscle use noted. No wheezing, rales, or rhonchi noted b/l   Cardiovascular: Regular rate, regular rhythm, normal S1, S2; no murmurs or rub   Gastrointestinal: Soft, non-tender, non-distended, + bowel sounds   Extremities: SINGH x 4, no peripheral edema, no cyanosis, no clubbing    Vascular: Equal and normal pulses: 2+ peripheral pulses throughout  Neurological: A+O x 3; speech clear and intact; no gross sensory/motor deficits  Psychiatric: calm, normal mood, normal affect   Skin: warm, dry, well perfused, no rashes   Tubes/Lines: Lt pleural PTC to sxn, purulent output, no obvious air leak noted

## 2024-01-23 NOTE — PROGRESS NOTE ADULT - ASSESSMENT
58M PMHx DM, HTN, active smoker, who was sent to the ED for abnormal outpatient CT chest. Patient reported left sided CP associated with dry cough and shortness of breath. CT chest showed L. sided pleural effusion and right sided lung nodules. Patient reported decrease appetite and weight loss (unintentional). Endocrine consulted for diabetes management, a1c 14%. He was only on Metformin but stopped it and then resumed it 2 weeks ago.    1. Uncontrolled DM with hyperglycemia  - Increase premeal admelog to 16 units TID with meals  - Increase lantus to 35 units qhs  - Moderate sliding scale coverage  - Needs diabetic educationa and insulin teaching  - Cpeptide, PHILL, islet cell ab pending for tomorrow    2. Pleural effusion  - Maintain left pigtail to suction given empyema, monitor output  - Daily CXR  - Pleural fluid + MSSA    3. HTN  - Lisinopril 58M PMHx DM, HTN, active smoker, who was sent to the ED for abnormal outpatient CT chest. Patient reported left sided CP associated with dry cough and shortness of breath. CT chest showed L. sided pleural effusion and right sided lung nodules. Patient reported decrease appetite and weight loss (unintentional). Endocrine consulted for diabetes management, a1c 14%. He was only on Metformin but stopped it and then resumed it 2 weeks ago.    1. Uncontrolled DM with hyperglycemia  - Increase premeal admelog to 16 units TID with meals  - Increase lantus to 35 units qhs  - Moderate sliding scale coverage  - Needs diabetic education and insulin teaching  - Cpeptide, PHILL, islet cell ab pending for tomorrow    2. Pleural effusion  - Maintain left pigtail to suction given empyema, monitor output  - Daily CXR  - Pleural fluid + MSSA    3. HTN  - Lisinopril

## 2024-01-23 NOTE — PROGRESS NOTE ADULT - PROBLEM SELECTOR PLAN 1
CTA Chest with loculated left sided pleural effusion, bilateral hilar and mediastinal lymphadenopathy, two nodules demonstrating central lucency/cavitation are noted in the right upper and right lower lobes  Maintain left pigtail to suction given empyema, monitor output  Daily CXR  +Covid with superimposed bacterial PNA  Pleural fluid + MSSA  On Zosyn per ID  Blood cx NGTD  Trend WBC, monitor for fevers  History , active smoker, will need to r/o malignancy  Repeat CT Chest 1/21 still with trapped fluid collection, will monitor for resolution, if no improvement patient may need VATS/decort  No indication for TPA/Dornase as fluid residual fluid collection not communicating with existing chest tube  Continue care as per primary team  Thoracic surgery to follow CTA Chest with loculated left sided pleural effusion, bilateral hilar and mediastinal lymphadenopathy, two nodules demonstrating central lucency/cavitation are noted in the right upper and right lower lobes  Maintain left pigtail to suction given empyema, monitor output  Daily CXR  +Covid with superimposed bacterial PNA/empyema  Pleural fluid + MSSA  On Zosyn per ID  Blood cx NGTD  Trend WBC, monitor for fevers  History , active smoker, will need to r/o malignancy  Repeat CT Chest 1/21 still with trapped fluid collection, will monitor for resolution, if no improvement patient may need VATS/decort  Will need medicine clearance for OR  No indication for TPA/Dornase as residual fluid collection not communicating with existing chest tube  Continue care as per primary team  Thoracic surgery to follow

## 2024-01-24 ENCOUNTER — TRANSCRIPTION ENCOUNTER (OUTPATIENT)
Age: 59
End: 2024-01-24

## 2024-01-24 PROBLEM — E11.9 TYPE 2 DIABETES MELLITUS WITHOUT COMPLICATIONS: Chronic | Status: ACTIVE | Noted: 2024-01-19

## 2024-01-24 PROBLEM — Z00.00 ENCOUNTER FOR PREVENTIVE HEALTH EXAMINATION: Status: ACTIVE | Noted: 2024-01-24

## 2024-01-24 LAB
ALBUMIN SERPL ELPH-MCNC: 2.5 G/DL — LOW (ref 3.3–5.2)
ALP SERPL-CCNC: 95 U/L — SIGNIFICANT CHANGE UP (ref 40–120)
ALT FLD-CCNC: 22 U/L — SIGNIFICANT CHANGE UP
ANION GAP SERPL CALC-SCNC: 13 MMOL/L — SIGNIFICANT CHANGE UP (ref 5–17)
AST SERPL-CCNC: 18 U/L — SIGNIFICANT CHANGE UP
BILIRUB SERPL-MCNC: 0.3 MG/DL — LOW (ref 0.4–2)
BUN SERPL-MCNC: 13 MG/DL — SIGNIFICANT CHANGE UP (ref 8–20)
C PEPTIDE SERPL-MCNC: 1.2 NG/ML — SIGNIFICANT CHANGE UP (ref 1.1–4.4)
CALCIUM SERPL-MCNC: 8.7 MG/DL — SIGNIFICANT CHANGE UP (ref 8.4–10.5)
CHLORIDE SERPL-SCNC: 95 MMOL/L — LOW (ref 96–108)
CO2 SERPL-SCNC: 23 MMOL/L — SIGNIFICANT CHANGE UP (ref 22–29)
CREAT SERPL-MCNC: 0.55 MG/DL — SIGNIFICANT CHANGE UP (ref 0.5–1.3)
CULTURE RESULTS: SIGNIFICANT CHANGE UP
CULTURE RESULTS: SIGNIFICANT CHANGE UP
EGFR: 114 ML/MIN/1.73M2 — SIGNIFICANT CHANGE UP
GLUCOSE BLDC GLUCOMTR-MCNC: 206 MG/DL — HIGH (ref 70–99)
GLUCOSE BLDC GLUCOMTR-MCNC: 244 MG/DL — HIGH (ref 70–99)
GLUCOSE BLDC GLUCOMTR-MCNC: 255 MG/DL — HIGH (ref 70–99)
GLUCOSE BLDC GLUCOMTR-MCNC: 348 MG/DL — HIGH (ref 70–99)
GLUCOSE SERPL-MCNC: 199 MG/DL — HIGH (ref 70–99)
HCT VFR BLD CALC: 39.3 % — SIGNIFICANT CHANGE UP (ref 39–50)
HGB BLD-MCNC: 12.9 G/DL — LOW (ref 13–17)
MAGNESIUM SERPL-MCNC: 1.7 MG/DL — SIGNIFICANT CHANGE UP (ref 1.6–2.6)
MCHC RBC-ENTMCNC: 29.9 PG — SIGNIFICANT CHANGE UP (ref 27–34)
MCHC RBC-ENTMCNC: 32.8 GM/DL — SIGNIFICANT CHANGE UP (ref 32–36)
MCV RBC AUTO: 91.2 FL — SIGNIFICANT CHANGE UP (ref 80–100)
PLATELET # BLD AUTO: 440 K/UL — HIGH (ref 150–400)
POTASSIUM SERPL-MCNC: 4.1 MMOL/L — SIGNIFICANT CHANGE UP (ref 3.5–5.3)
POTASSIUM SERPL-SCNC: 4.1 MMOL/L — SIGNIFICANT CHANGE UP (ref 3.5–5.3)
PROCALCITONIN SERPL-MCNC: 0.07 NG/ML — SIGNIFICANT CHANGE UP (ref 0.02–0.1)
PROT SERPL-MCNC: 7.6 G/DL — SIGNIFICANT CHANGE UP (ref 6.6–8.7)
RBC # BLD: 4.31 M/UL — SIGNIFICANT CHANGE UP (ref 4.2–5.8)
RBC # FLD: 11.9 % — SIGNIFICANT CHANGE UP (ref 10.3–14.5)
SODIUM SERPL-SCNC: 131 MMOL/L — LOW (ref 135–145)
SPECIMEN SOURCE: SIGNIFICANT CHANGE UP
SPECIMEN SOURCE: SIGNIFICANT CHANGE UP
WBC # BLD: 10.2 K/UL — SIGNIFICANT CHANGE UP (ref 3.8–10.5)
WBC # FLD AUTO: 10.2 K/UL — SIGNIFICANT CHANGE UP (ref 3.8–10.5)

## 2024-01-24 PROCEDURE — 99232 SBSQ HOSP IP/OBS MODERATE 35: CPT

## 2024-01-24 PROCEDURE — 71045 X-RAY EXAM CHEST 1 VIEW: CPT | Mod: 26

## 2024-01-24 PROCEDURE — 99233 SBSQ HOSP IP/OBS HIGH 50: CPT

## 2024-01-24 PROCEDURE — 99231 SBSQ HOSP IP/OBS SF/LOW 25: CPT

## 2024-01-24 RX ORDER — INSULIN GLARGINE 100 [IU]/ML
20 INJECTION, SOLUTION SUBCUTANEOUS AT BEDTIME
Refills: 0 | Status: DISCONTINUED | OUTPATIENT
Start: 2024-01-24 | End: 2024-01-25

## 2024-01-24 RX ORDER — NAFCILLIN 10 G/100ML
2 INJECTION, POWDER, FOR SOLUTION INTRAVENOUS EVERY 4 HOURS
Refills: 0 | Status: ACTIVE | OUTPATIENT
Start: 2024-01-24 | End: 2024-12-22

## 2024-01-24 RX ADMIN — Medication 4: at 08:48

## 2024-01-24 RX ADMIN — Medication 1 PATCH: at 13:07

## 2024-01-24 RX ADMIN — NAFCILLIN 200 GRAM(S): 10 INJECTION, POWDER, FOR SOLUTION INTRAVENOUS at 18:19

## 2024-01-24 RX ADMIN — Medication 16 UNIT(S): at 08:48

## 2024-01-24 RX ADMIN — Medication 1 PATCH: at 12:17

## 2024-01-24 RX ADMIN — Medication 4: at 12:20

## 2024-01-24 RX ADMIN — Medication 6: at 17:45

## 2024-01-24 RX ADMIN — Medication 16 UNIT(S): at 17:46

## 2024-01-24 RX ADMIN — Medication 1 PATCH: at 19:00

## 2024-01-24 RX ADMIN — NAFCILLIN 200 GRAM(S): 10 INJECTION, POWDER, FOR SOLUTION INTRAVENOUS at 21:55

## 2024-01-24 RX ADMIN — INSULIN GLARGINE 20 UNIT(S): 100 INJECTION, SOLUTION SUBCUTANEOUS at 21:53

## 2024-01-24 RX ADMIN — LISINOPRIL 10 MILLIGRAM(S): 2.5 TABLET ORAL at 05:30

## 2024-01-24 RX ADMIN — Medication 16 UNIT(S): at 12:21

## 2024-01-24 RX ADMIN — Medication 4: at 22:07

## 2024-01-24 RX ADMIN — ENOXAPARIN SODIUM 40 MILLIGRAM(S): 100 INJECTION SUBCUTANEOUS at 21:53

## 2024-01-24 RX ADMIN — NAFCILLIN 200 GRAM(S): 10 INJECTION, POWDER, FOR SOLUTION INTRAVENOUS at 13:09

## 2024-01-24 RX ADMIN — PIPERACILLIN AND TAZOBACTAM 25 GRAM(S): 4; .5 INJECTION, POWDER, LYOPHILIZED, FOR SOLUTION INTRAVENOUS at 08:50

## 2024-01-24 RX ADMIN — PIPERACILLIN AND TAZOBACTAM 25 GRAM(S): 4; .5 INJECTION, POWDER, LYOPHILIZED, FOR SOLUTION INTRAVENOUS at 00:39

## 2024-01-24 NOTE — PROGRESS NOTE ADULT - PROBLEM SELECTOR PLAN 1
CTA Chest with loculated left sided pleural effusion, bilateral hilar and mediastinal lymphadenopathy, two nodules demonstrating central lucency/cavitation are noted in the right upper and right lower lobes  Maintain left pigtail to suction given empyema, monitor output  Daily CXR  +Covid with superimposed bacterial PNA/empyema  Pleural fluid + MSSA  On Zosyn per ID  Blood cx NGTD  Trend WBC, monitor for fevers  History , active smoker, will need to r/o malignancy  Repeat CT Chest 1/21 still with trapped fluid collection, will monitor for resolution, if no improvement patient may need VATS/decort  Will need medicine clearance for OR  No indication for TPA/Dornase as residual fluid collection not communicating with existing chest tube  Continue care as per primary team  Thoracic surgery to follow CTA Chest with loculated left sided pleural effusion, bilateral hilar and mediastinal lymphadenopathy, two nodules demonstrating central lucency/cavitation are noted in the right upper and right lower lobes  Maintain left pigtail to suction given empyema, monitor output  Daily CXR  +Covid with superimposed bacterial PNA/empyema  Pleural fluid + MSSA  On Zosyn per ID  Blood cx NGTD  Trend WBC, monitor for fevers  History , active smoker, will need to r/o malignancy  Repeat CT Chest 1/21 still with trapped fluid collection  OR tomorrow for VATS/Decort  NPO p MN, pre op orders in place  Medicine clearance appreciated  Continue care as per primary team

## 2024-01-24 NOTE — PROGRESS NOTE ADULT - ASSESSMENT
58M PMHx DM, HTN, active smoker, who was sent to the ED for abnormal outpatient CT chest. Patient reported left sided CP associated with dry cough and shortness of breath. CT chest showed L. sided pleural effusion and right sided lung nodules. Patient reported decrease appetite and weight loss (unintentional). Endocrine consulted for diabetes management, a1c 14%. He was only on Metformin but stopped it and then resumed it 2 weeks ago.    1. Uncontrolled DM with hyperglycemia, glucoses improving  - Continue premeal admelog 16 units TID with meals  - Decrease lantus to 20 units qhs as he will be NPO after midnight  - Moderate sliding scale coverage  - Needs diabetic education and insulin teaching  - C-peptide, PHILL, islet cell ab pending     2. Pleural effusion  - Maintain left pigtail to suction given empyema, monitor output  - Plan for VATS tomorrow in OR, NPO after midnight    3. HTN  - Lisinopril

## 2024-01-24 NOTE — PROGRESS NOTE ADULT - SUBJECTIVE AND OBJECTIVE BOX
BRIEF HOSPITAL COURSE  58 year old male with a PMHx of HTN, HLD, type 2 DM (on oral agents), active tobacco smoker (3/4PPD), with complaint of recent unintentional weight loss, dry cough, SOB, left sided pleuritic pain X 4-5 days, found with a loculated left sided pleural effusion, bilateral hilar and mediastinal lymphadenopathy, and a 1cm RLL irregular nodular consolidation with central round gas lucency on CTA chest. Thoracic Surgery called for consult. Patient also with + COVID. S/p insertion left PTC with purulent drainage. Pleural fluid culture + MSSA    SIGNIFICANT RECENT/PAST 24 HR EVENTS  No acute events reported overnight.     SUBJECTIVE  Patient seen and examined on follow up for left loculated effusion. Pt sitting on side of bed having breakfast. Denies fevers, chills, HA, dizziness, CP, SOB, abd pain, N/V/D, numbness/tingling in extremities, or any other acute complaints. States no pain.  +Ambulating during day  +Using incentive as instructed  ROS negative x 10 systems except as noted above.    PAST MEDICAL & SURGICAL HISTORY  Hypertension  DM (diabetes mellitus)  No significant past surgical history    DAILY REVIEW  Vitals   T(C): 36.6 (24 Jan 2024 04:58), Max: 37.2 (23 Jan 2024 20:00)  T(F): 97.9 (24 Jan 2024 04:58), Max: 98.9 (23 Jan 2024 20:00)  HR: 78 (24 Jan 2024 04:58) (78 - 93)  BP: 107/75 (24 Jan 2024 04:58) (100/65 - 107/75)  RR: 17 (24 Jan 2024 04:58) (17 - 18)  SpO2: 94% (24 Jan 2024 04:58) (93% - 94%)    O2 Parameters below as of 24 Jan 2024 04:58  Patient On (Oxygen Delivery Method): room air    I&O's Detail    23 Jan 2024 07:01  -  24 Jan 2024 07:00  --------------------------------------------------------  IN:    Oral Fluid: 240 mL  Total IN: 240 mL    OUT:    Chest Tube (mL): 50 mL  Total OUT: 50 mL    Total NET: 190 mL    Admit Wt: Drug Dosing Weight  Height (cm): 180.3 (18 Jan 2024 16:22)  Weight (kg): 80.6 (18 Jan 2024 16:22)  BMI (kg/m2): 24.8 (18 Jan 2024 16:22)  BSA (m2): 2.01 (18 Jan 2024 16:22)    LABS                        12.9   10.20 )-----------( 440      ( 24 Jan 2024 06:45 )             39.3     01-24    131<L>  |  95<L>  |  13.0  ----------------------------<  199<H>  4.1   |  23.0  |  0.55    Ca    8.7      24 Jan 2024 06:45  Mg     1.7     01-24    TPro  7.6  /  Alb  2.5<L>  /  TBili  0.3<L>  /  DBili  x   /  AST  18  /  ALT  22  /  AlkPhos  95  01-24    LIVER FUNCTIONS - ( 24 Jan 2024 06:45 )  Alb: 2.5 g/dL / Pro: 7.6 g/dL / ALK PHOS: 95 U/L / ALT: 22 U/L / AST: 18 U/L / GGT: x           MEDICATIONS  MEDICATIONS  (STANDING):  dextrose 5%. 1000 milliLiter(s) (50 mL/Hr) IV Continuous <Continuous>  dextrose 5%. 1000 milliLiter(s) (100 mL/Hr) IV Continuous <Continuous>  dextrose 50% Injectable 25 Gram(s) IV Push once  dextrose 50% Injectable 12.5 Gram(s) IV Push once  dextrose 50% Injectable 25 Gram(s) IV Push once  enoxaparin Injectable 40 milliGRAM(s) SubCutaneous every 24 hours  glucagon  Injectable 1 milliGRAM(s) IntraMuscular once  insulin glargine Injectable (LANTUS) 35 Unit(s) SubCutaneous at bedtime  insulin lispro (ADMELOG) corrective regimen sliding scale   SubCutaneous three times a day before meals  insulin lispro (ADMELOG) corrective regimen sliding scale   SubCutaneous at bedtime  insulin lispro Injectable (ADMELOG) 16 Unit(s) SubCutaneous three times a day before meals  lisinopril 10 milliGRAM(s) Oral daily  nicotine -  14 mG/24Hr(s) Patch 1 Patch Transdermal daily  piperacillin/tazobactam IVPB.. 3.375 Gram(s) IV Intermittent every 8 hours    MEDICATIONS  (PRN):  acetaminophen     Tablet .. 650 milliGRAM(s) Oral every 6 hours PRN Temp greater or equal to 38C (100.4F), Mild Pain (1 - 3)  aluminum hydroxide/magnesium hydroxide/simethicone Suspension 30 milliLiter(s) Oral every 4 hours PRN Dyspepsia  benzonatate 100 milliGRAM(s) Oral three times a day PRN Cough  dextrose Oral Gel 15 Gram(s) Oral once PRN Blood Glucose LESS THAN 70 milliGRAM(s)/deciliter  melatonin 3 milliGRAM(s) Oral at bedtime PRN Insomnia  ondansetron Injectable 4 milliGRAM(s) IV Push every 8 hours PRN Nausea and/or Vomiting  oxyCODONE    IR 5 milliGRAM(s) Oral every 6 hours PRN Severe Pain (7 - 10)    ALLERGIES  No Known Allergies    DIAGNOSTICS  All relevant and available laboratory results, radiology and medications reviewed.  CT Chest No Cont (01.21.24 @ 16:59)  FINDINGS:  AIRWAYS, LUNGS, PLEURA: Saber-sheath trachea. Patent central airways.   Interval insertion of right basilar pigtail catheter and decrease of   loculated left pleural effusion. Residual trapped fluid within the   fissure. Left basilar pleural thickening.    Stable 0.7 and 1 cm cavitary nodules within right upper and right lower   lobe.    LYMPH NODES, MEDIASTINUM: Stable borderline/mildly enlarged mediastinal   and hilar lymph nodes.    HEART, VESSELS: Heart size is normal. No pericardial effusion. Thoracic   aorta normal in diameter. Coronary and aortic valve leaflet   calcifications.    VISUALIZED UPPER ABDOMEN: Within normal limits.    CHEST WALL, BONES: No aggressive osseous lesion.    LOWER NECK: Within normal limits.    IMPRESSION:    Interval insertion of right basilar pigtail catheter and decrease of   loculated left pleural effusion. Residual trapped fluid within the   fissure. Left basilar pleural thickening.    Stable 0.7 and 1 cm cavitary nodules within right upper and right lower   lobe.    Stable borderline/mildly enlarged mediastinal and hilar lymph nodes.    Xray Chest 1 View- PORTABLE-Routine (Xray Chest 1 View- PORTABLE-Routine in AM.) (01.20.24 @ 06:41)  Findings:  Impression: Status post left chest tube. The heart is unremarkable. Trace   left pleural effusion. No pneumothorax.    CT Angio Chest PE Protocol w/ IV Cont (01.18.24 @ 21:07)  CT angiogram of the chest was obtained following administration of   intravenous contrast. Approximately 70 cc of Omnipaque 300 was   administered. Coronal, sagittal and MIP images were submitted for review.    Few small lymph nodes are present in the right paratracheal space, AP   window and thesubcarinal region.    Heart is normal in size. Calcification of the aortic valve and coronary   arteries is noted. No pericardial effusion is noted. Main pulmonary   artery measures 3.4 cm. No filling defects are noted.    No endobronchial lesions are noted. Two nodules demonstrating central   lucency/cavitation are noted in the right upper and right lower lobes.   The larger one is noted in the right lower lobe and measures 1 cm.   Compressive atelectasis is noted involving portion of the left lower   lobe. This is secondary to small to moderate-sized loculated left pleural   effusion.    Below the diaphragm, visualized portions of the abdomen demonstrate   cholelithiasis.    Degenerative changes of the spine are noted.    IMPRESSION: No pulmonary embolus is noted.  Two nodules demonstrating central lucency/cavitation are noted in the   right upper and right lower lobes. Exact etiology is unclear.    Small to moderate-sized loculated left pleural effusion.    Xray Chest 2 Views PA/Lat (01.18.24 @ 17:41)  FINDINGS:  Single frontal view of the chest demonstrates moderate layering   left-sided pleural effusion. The cardiomediastinal silhouette is normal.   No acute osseous abnormalities. Please refer to the subsequent chest CT   for further details.    IMPRESSION: Moderate layering left-sided pleural effusion.    PHYSICAL EXAM  Constitutional: NAD, well developed  Neck: supple, trachea midline. No JVD   Respiratory: Breath sounds diminished left base, no accessory muscle use noted. No wheezing, rales, or rhonchi noted b/l   Cardiovascular: Regular rate, regular rhythm, normal S1, S2; no murmurs or rub   Gastrointestinal: Soft, non-tender, non-distended, + bowel sounds   Extremities: SINGH x 4, no peripheral edema, no cyanosis, no clubbing    Vascular: Equal and normal pulses: 2+ peripheral pulses throughout  Neurological: A+O x 3; speech clear and intact; no gross sensory/motor deficits  Psychiatric: calm, normal mood, normal affect   Skin: warm, dry, well perfused, no rashes   Tubes/Lines: Lt pleural PTC to sxn, purulent output, no obvious air leak noted BRIEF HOSPITAL COURSE  58 year old male with a PMHx of HTN, HLD, type 2 DM (on oral agents), active tobacco smoker (3/4PPD), with complaint of recent unintentional weight loss, dry cough, SOB, left sided pleuritic pain X 4-5 days, found with a loculated left sided pleural effusion, bilateral hilar and mediastinal lymphadenopathy, and a 1cm RLL irregular nodular consolidation with central round gas lucency on CTA chest. Thoracic Surgery called for consult. Patient also with + COVID. S/p insertion left PTC with purulent drainage. Pleural fluid culture + MSSA    SIGNIFICANT RECENT/PAST 24 HR EVENTS  No acute events reported overnight. Plan for OR for VATS/Decort on 1/25    SUBJECTIVE  Patient seen and examined on follow up for left loculated effusion. Pt sitting on side of bed having breakfast. Denies fevers, chills, HA, dizziness, CP, SOB, abd pain, N/V/D, numbness/tingling in extremities, or any other acute complaints. States no pain.  +Ambulating during day  +Using incentive as instructed  ROS negative x 10 systems except as noted above.    PAST MEDICAL & SURGICAL HISTORY  Hypertension  DM (diabetes mellitus)  No significant past surgical history    DAILY REVIEW  Vitals   T(C): 36.6 (24 Jan 2024 04:58), Max: 37.2 (23 Jan 2024 20:00)  T(F): 97.9 (24 Jan 2024 04:58), Max: 98.9 (23 Jan 2024 20:00)  HR: 78 (24 Jan 2024 04:58) (78 - 93)  BP: 107/75 (24 Jan 2024 04:58) (100/65 - 107/75)  RR: 17 (24 Jan 2024 04:58) (17 - 18)  SpO2: 94% (24 Jan 2024 04:58) (93% - 94%)    O2 Parameters below as of 24 Jan 2024 04:58  Patient On (Oxygen Delivery Method): room air    I&O's Detail    23 Jan 2024 07:01  -  24 Jan 2024 07:00  --------------------------------------------------------  IN:    Oral Fluid: 240 mL  Total IN: 240 mL    OUT:    Chest Tube (mL): 50 mL  Total OUT: 50 mL    Total NET: 190 mL    Admit Wt: Drug Dosing Weight  Height (cm): 180.3 (18 Jan 2024 16:22)  Weight (kg): 80.6 (18 Jan 2024 16:22)  BMI (kg/m2): 24.8 (18 Jan 2024 16:22)  BSA (m2): 2.01 (18 Jan 2024 16:22)    LABS                        12.9   10.20 )-----------( 440      ( 24 Jan 2024 06:45 )             39.3     01-24    131<L>  |  95<L>  |  13.0  ----------------------------<  199<H>  4.1   |  23.0  |  0.55    Ca    8.7      24 Jan 2024 06:45  Mg     1.7     01-24    TPro  7.6  /  Alb  2.5<L>  /  TBili  0.3<L>  /  DBili  x   /  AST  18  /  ALT  22  /  AlkPhos  95  01-24    LIVER FUNCTIONS - ( 24 Jan 2024 06:45 )  Alb: 2.5 g/dL / Pro: 7.6 g/dL / ALK PHOS: 95 U/L / ALT: 22 U/L / AST: 18 U/L / GGT: x           MEDICATIONS  MEDICATIONS  (STANDING):  dextrose 5%. 1000 milliLiter(s) (50 mL/Hr) IV Continuous <Continuous>  dextrose 5%. 1000 milliLiter(s) (100 mL/Hr) IV Continuous <Continuous>  dextrose 50% Injectable 25 Gram(s) IV Push once  dextrose 50% Injectable 12.5 Gram(s) IV Push once  dextrose 50% Injectable 25 Gram(s) IV Push once  enoxaparin Injectable 40 milliGRAM(s) SubCutaneous every 24 hours  glucagon  Injectable 1 milliGRAM(s) IntraMuscular once  insulin glargine Injectable (LANTUS) 35 Unit(s) SubCutaneous at bedtime  insulin lispro (ADMELOG) corrective regimen sliding scale   SubCutaneous three times a day before meals  insulin lispro (ADMELOG) corrective regimen sliding scale   SubCutaneous at bedtime  insulin lispro Injectable (ADMELOG) 16 Unit(s) SubCutaneous three times a day before meals  lisinopril 10 milliGRAM(s) Oral daily  nicotine -  14 mG/24Hr(s) Patch 1 Patch Transdermal daily  piperacillin/tazobactam IVPB.. 3.375 Gram(s) IV Intermittent every 8 hours    MEDICATIONS  (PRN):  acetaminophen     Tablet .. 650 milliGRAM(s) Oral every 6 hours PRN Temp greater or equal to 38C (100.4F), Mild Pain (1 - 3)  aluminum hydroxide/magnesium hydroxide/simethicone Suspension 30 milliLiter(s) Oral every 4 hours PRN Dyspepsia  benzonatate 100 milliGRAM(s) Oral three times a day PRN Cough  dextrose Oral Gel 15 Gram(s) Oral once PRN Blood Glucose LESS THAN 70 milliGRAM(s)/deciliter  melatonin 3 milliGRAM(s) Oral at bedtime PRN Insomnia  ondansetron Injectable 4 milliGRAM(s) IV Push every 8 hours PRN Nausea and/or Vomiting  oxyCODONE    IR 5 milliGRAM(s) Oral every 6 hours PRN Severe Pain (7 - 10)    ALLERGIES  No Known Allergies    DIAGNOSTICS  All relevant and available laboratory results, radiology and medications reviewed.  CT Chest No Cont (01.21.24 @ 16:59)  FINDINGS:  AIRWAYS, LUNGS, PLEURA: Saber-sheath trachea. Patent central airways.   Interval insertion of right basilar pigtail catheter and decrease of   loculated left pleural effusion. Residual trapped fluid within the   fissure. Left basilar pleural thickening.    Stable 0.7 and 1 cm cavitary nodules within right upper and right lower   lobe.    LYMPH NODES, MEDIASTINUM: Stable borderline/mildly enlarged mediastinal   and hilar lymph nodes.    HEART, VESSELS: Heart size is normal. No pericardial effusion. Thoracic   aorta normal in diameter. Coronary and aortic valve leaflet   calcifications.    VISUALIZED UPPER ABDOMEN: Within normal limits.    CHEST WALL, BONES: No aggressive osseous lesion.    LOWER NECK: Within normal limits.    IMPRESSION:    Interval insertion of right basilar pigtail catheter and decrease of   loculated left pleural effusion. Residual trapped fluid within the   fissure. Left basilar pleural thickening.    Stable 0.7 and 1 cm cavitary nodules within right upper and right lower   lobe.    Stable borderline/mildly enlarged mediastinal and hilar lymph nodes.    Xray Chest 1 View- PORTABLE-Routine (Xray Chest 1 View- PORTABLE-Routine in AM.) (01.20.24 @ 06:41)  Findings:  Impression: Status post left chest tube. The heart is unremarkable. Trace   left pleural effusion. No pneumothorax.    CT Angio Chest PE Protocol w/ IV Cont (01.18.24 @ 21:07)  CT angiogram of the chest was obtained following administration of   intravenous contrast. Approximately 70 cc of Omnipaque 300 was   administered. Coronal, sagittal and MIP images were submitted for review.    Few small lymph nodes are present in the right paratracheal space, AP   window and thesubcarinal region.    Heart is normal in size. Calcification of the aortic valve and coronary   arteries is noted. No pericardial effusion is noted. Main pulmonary   artery measures 3.4 cm. No filling defects are noted.    No endobronchial lesions are noted. Two nodules demonstrating central   lucency/cavitation are noted in the right upper and right lower lobes.   The larger one is noted in the right lower lobe and measures 1 cm.   Compressive atelectasis is noted involving portion of the left lower   lobe. This is secondary to small to moderate-sized loculated left pleural   effusion.    Below the diaphragm, visualized portions of the abdomen demonstrate   cholelithiasis.    Degenerative changes of the spine are noted.    IMPRESSION: No pulmonary embolus is noted.  Two nodules demonstrating central lucency/cavitation are noted in the   right upper and right lower lobes. Exact etiology is unclear.    Small to moderate-sized loculated left pleural effusion.    Xray Chest 2 Views PA/Lat (01.18.24 @ 17:41)  FINDINGS:  Single frontal view of the chest demonstrates moderate layering   left-sided pleural effusion. The cardiomediastinal silhouette is normal.   No acute osseous abnormalities. Please refer to the subsequent chest CT   for further details.    IMPRESSION: Moderate layering left-sided pleural effusion.    PHYSICAL EXAM  Constitutional: NAD, well developed  Neck: supple, trachea midline. No JVD   Respiratory: Breath sounds diminished left base, no accessory muscle use noted. No wheezing, rales, or rhonchi noted b/l   Cardiovascular: Regular rate, regular rhythm, normal S1, S2; no murmurs or rub   Gastrointestinal: Soft, non-tender, non-distended, + bowel sounds   Extremities: SINGH x 4, no peripheral edema, no cyanosis, no clubbing    Vascular: Equal and normal pulses: 2+ peripheral pulses throughout  Neurological: A+O x 3; speech clear and intact; no gross sensory/motor deficits  Psychiatric: calm, normal mood, normal affect   Skin: warm, dry, well perfused, no rashes   Tubes/Lines: Lt pleural PTC to sxn, purulent output, no obvious air leak noted

## 2024-01-24 NOTE — PROGRESS NOTE ADULT - PROBLEM SELECTOR PLAN 2
Pulmonary following - appreciate risk stratification for OR planning  No hypoxia  Completed course of Remdesivir

## 2024-01-24 NOTE — PROGRESS NOTE ADULT - SUBJECTIVE AND OBJECTIVE BOX
INTERVAL EVENTS:  Follow up diabetes management    ROS: Denies chest pain, sob, abd pain.     MEDICATIONS  (STANDING):  dextrose 5%. 1000 milliLiter(s) (100 mL/Hr) IV Continuous <Continuous>  dextrose 5%. 1000 milliLiter(s) (50 mL/Hr) IV Continuous <Continuous>  dextrose 50% Injectable 25 Gram(s) IV Push once  dextrose 50% Injectable 12.5 Gram(s) IV Push once  dextrose 50% Injectable 25 Gram(s) IV Push once  enoxaparin Injectable 40 milliGRAM(s) SubCutaneous every 24 hours  glucagon  Injectable 1 milliGRAM(s) IntraMuscular once  insulin glargine Injectable (LANTUS) 20 Unit(s) SubCutaneous at bedtime  insulin lispro (ADMELOG) corrective regimen sliding scale   SubCutaneous at bedtime  insulin lispro (ADMELOG) corrective regimen sliding scale   SubCutaneous three times a day before meals  insulin lispro Injectable (ADMELOG) 16 Unit(s) SubCutaneous three times a day before meals  lisinopril 10 milliGRAM(s) Oral daily  nafcillin  IVPB 2 Gram(s) IV Intermittent every 4 hours  nicotine -  14 mG/24Hr(s) Patch 1 Patch Transdermal daily    MEDICATIONS  (PRN):  acetaminophen     Tablet .. 650 milliGRAM(s) Oral every 6 hours PRN Temp greater or equal to 38C (100.4F), Mild Pain (1 - 3)  aluminum hydroxide/magnesium hydroxide/simethicone Suspension 30 milliLiter(s) Oral every 4 hours PRN Dyspepsia  benzonatate 100 milliGRAM(s) Oral three times a day PRN Cough  dextrose Oral Gel 15 Gram(s) Oral once PRN Blood Glucose LESS THAN 70 milliGRAM(s)/deciliter  melatonin 3 milliGRAM(s) Oral at bedtime PRN Insomnia  ondansetron Injectable 4 milliGRAM(s) IV Push every 8 hours PRN Nausea and/or Vomiting  oxyCODONE    IR 5 milliGRAM(s) Oral every 6 hours PRN Severe Pain (7 - 10)    Allergies  No Known Allergies    Vital Signs Last 24 Hrs  T(C): 37 (24 Jan 2024 08:06), Max: 37.2 (23 Jan 2024 20:00)  T(F): 98.6 (24 Jan 2024 08:06), Max: 98.9 (23 Jan 2024 20:00)  HR: 80 (24 Jan 2024 08:06) (78 - 93)  BP: 103/73 (24 Jan 2024 08:06) (100/65 - 107/75)  BP(mean): --  RR: 17 (24 Jan 2024 08:06) (17 - 18)  SpO2: 92% (24 Jan 2024 08:06) (92% - 94%)    Parameters below as of 24 Jan 2024 08:06  Patient On (Oxygen Delivery Method): room air    PHYSICAL EXAM:  General: No apparent distress  Neck: Supple, trachea midline, no thyromegaly  Respiratory: Lungs clear bilaterally, chest tube in place  Cardiac: +S1, S2, no m/r/g  GI: +BS, soft, non tender, non distended  Extremities: No peripheral edema, no pedal lesions  Neuro: A+O X3, no tremor      LABS:                        12.9   10.20 )-----------( 440      ( 24 Jan 2024 06:45 )             39.3     01-24    131<L>  |  95<L>  |  13.0  ----------------------------<  199<H>  4.1   |  23.0  |  0.55    Ca    8.7      24 Jan 2024 06:45  Mg     1.7     01-24    TPro  7.6  /  Alb  2.5<L>  /  TBili  0.3<L>  /  DBili  x   /  AST  18  /  ALT  22  /  AlkPhos  95  01-24    Urinalysis Basic - ( 24 Jan 2024 06:45 )    Color: x / Appearance: x / SG: x / pH: x  Gluc: 199 mg/dL / Ketone: x  / Bili: x / Urobili: x   Blood: x / Protein: x / Nitrite: x   Leuk Esterase: x / RBC: x / WBC x   Sq Epi: x / Non Sq Epi: x / Bacteria: x      POCT Blood Glucose.: 206 mg/dL (01-24-24 @ 08:08)  POCT Blood Glucose.: 156 mg/dL (01-23-24 @ 21:28)  POCT Blood Glucose.: 140 mg/dL (01-23-24 @ 17:06)  POCT Blood Glucose.: 273 mg/dL (01-23-24 @ 12:01)

## 2024-01-24 NOTE — PROGRESS NOTE ADULT - PROBLEM SELECTOR PLAN 3
A1c 14.4 On metformin at home  Started on basal/bolus and ISS  Dosing adjustment based on glucose levels  Endocrine consult and diabetes education A1c 14.4 On metformin at home  Started on basal/bolus and ISS  Dosing adjustment based on glucose levels  Half dose basal for tonight given will be NPO for tomorrow  Endocrine consult and diabetes education

## 2024-01-24 NOTE — PROGRESS NOTE ADULT - ASSESSMENT
59 y/o male with PMH of HTN, DM-2, active tobacco use was sent to the ED for abnormal outpatient CT chest. Patient reported pain on his left side x 4-5days associated with dry cough and shortness of breath. Sent for CT chest which showed pleural effusion and right sided lung nodules. Patient reported decrease appetite and weight loss (unintentional). In the ED, CTA chest: 2 loculated pleural fluid collections on the left; mildly prominent bilateral hilar and mediastinal lymphadenopathy. 1cm irregular nodular consolidation either bronchiolar dilatation or cavitation likely infection vs cavitary neoplasm.     #Empyema   s/p chest tube 1/19  Fluid growing gram (+) cocci, MSSA  CTS following and managing Chest tube, Plan for VATS tomorrow  ID consulted, cont Nafcillin   , tele     #HTN   Lisinopril 10mg   Pt counseled on diet/lifestyle modifications     #Hyperglycemia with DM-2   #uncontrolled DM  A1c >14  endo consult appreciated  Continue premeal admelog 16 units TID with meals  Decrease lantus to 20 units qhs as he will be NPO after midnight  ISS, FSG qac/qhs  diabetic diet   Pt counseled on diet/lifestyle modifications   Diabetic educator. Patient will need to go home on insulin.     #Hypomagnesemia / hyponatremia due to pseudohyponatremia from hyperglycemia   na levels daily   monitor lytes    #COVID-19 infection   no hypoxia   Completed remdesevir   if hypoxic will start decadron   supportive treatment     dvt ppx: Lovenox    Patient is medically optimized for planned procedure. Class 2 risk. 6.0% 30-day risk of death, MI, or cardiac arrest

## 2024-01-24 NOTE — PROGRESS NOTE ADULT - ASSESSMENT
58y  Male with h/o HTN, HLD, type 2 DM (on oral agents), active tobacco smoker (3/4PPD), was sent to the ED for abnormal outpatient CT chest. Patient reported pain on his left side x 4-5 days associated with dry cough and shortness of breath. Here he has been afebrile with leukocytosis to 12k. CT chest which showed pleural effusion and right sided lung nodules. Patient reported decrease appetite and weight loss (unintentional). He has no fever, night sweat, nausea, vomiting, abdominal pain, fall, trauma to the side, change in bowel/urinary habit, recent travel, sick contact. He was round to be COVID 19 positive. Started on Vancomycin, Zosyn. Pleural fluid with MSSA.      Antibiotics Course:  piperacillin/tazobactam 1/19 - 1/24  remdesivir 1/19 - 1/23  vancomycin  1/20 - 1/22  Nafcillin 1/24 - Present       Staphylococcus aureus empyema   Leukocytosis   Acute COVID 19  Active smoker       - Blood cultures 1/18 no growth   - Pleural Fluid culture 1/19 reporting Staphylococcus aureus  (MSSA)  - RVP/COVID 19 PCR 1/18 + SARS-CoV-2  - Urine Cx 1/19 not significant   - CT Chest 1/21 reporting decreased loculated left pleural effusion. cavitary nodules within right upper and right lower lobe  - CTA Chest 1/18 reporting no PE and loculated left pleural effusion  - UA 1/19 not concerning for UTI  - LDH pleural fluid 7304  - Procalcitonin level 0.07  - Completed 5 days of Remdesivir  1/23/24   - D/C  Zosyn  - Start Nafcillin   - Thoracic surgery following, may benefit from VATS, planned for OR.  - medical clearance done by medicine  - Hold off on PICC/Midline for now unless needed for reasons other than infectious diseases  - Follow up cultures  - Trend Fever  - Trend WBC      Will Follow    Discussed treatment plan with: Dr Traore

## 2024-01-24 NOTE — PROGRESS NOTE ADULT - SUBJECTIVE AND OBJECTIVE BOX
[facility] Division of Hospital Medicine    Chief Complaint:  SOB    SUBJECTIVE / OVERNIGHT EVENTS: Patient seen and examined no complaint. VATS tomorrow. CT in place    Patient denies chest pain, SOB, abd pain, N/V, fever, chills, dysuria or any other complaints. All remainder ROS negative.     MEDICATIONS  (STANDING):  dextrose 5%. 1000 milliLiter(s) (50 mL/Hr) IV Continuous <Continuous>  dextrose 5%. 1000 milliLiter(s) (100 mL/Hr) IV Continuous <Continuous>  dextrose 50% Injectable 25 Gram(s) IV Push once  dextrose 50% Injectable 12.5 Gram(s) IV Push once  dextrose 50% Injectable 25 Gram(s) IV Push once  enoxaparin Injectable 40 milliGRAM(s) SubCutaneous every 24 hours  glucagon  Injectable 1 milliGRAM(s) IntraMuscular once  insulin glargine Injectable (LANTUS) 20 Unit(s) SubCutaneous at bedtime  insulin lispro (ADMELOG) corrective regimen sliding scale   SubCutaneous at bedtime  insulin lispro (ADMELOG) corrective regimen sliding scale   SubCutaneous three times a day before meals  insulin lispro Injectable (ADMELOG) 16 Unit(s) SubCutaneous three times a day before meals  lisinopril 10 milliGRAM(s) Oral daily  nafcillin  IVPB 2 Gram(s) IV Intermittent every 4 hours  nicotine -  14 mG/24Hr(s) Patch 1 Patch Transdermal daily    MEDICATIONS  (PRN):  acetaminophen     Tablet .. 650 milliGRAM(s) Oral every 6 hours PRN Temp greater or equal to 38C (100.4F), Mild Pain (1 - 3)  aluminum hydroxide/magnesium hydroxide/simethicone Suspension 30 milliLiter(s) Oral every 4 hours PRN Dyspepsia  benzonatate 100 milliGRAM(s) Oral three times a day PRN Cough  dextrose Oral Gel 15 Gram(s) Oral once PRN Blood Glucose LESS THAN 70 milliGRAM(s)/deciliter  melatonin 3 milliGRAM(s) Oral at bedtime PRN Insomnia  ondansetron Injectable 4 milliGRAM(s) IV Push every 8 hours PRN Nausea and/or Vomiting  oxyCODONE    IR 5 milliGRAM(s) Oral every 6 hours PRN Severe Pain (7 - 10)        I&O's Summary    23 Jan 2024 07:01  -  24 Jan 2024 07:00  --------------------------------------------------------  IN: 240 mL / OUT: 50 mL / NET: 190 mL        PHYSICAL EXAM:  Vital Signs Last 24 Hrs  T(C): 37 (24 Jan 2024 08:06), Max: 37.2 (23 Jan 2024 20:00)  T(F): 98.6 (24 Jan 2024 08:06), Max: 98.9 (23 Jan 2024 20:00)  HR: 80 (24 Jan 2024 08:06) (78 - 93)  BP: 103/73 (24 Jan 2024 08:06) (100/65 - 107/75)  BP(mean): --  RR: 17 (24 Jan 2024 08:06) (17 - 18)  SpO2: 92% (24 Jan 2024 08:06) (92% - 94%)    Parameters below as of 24 Jan 2024 08:06  Patient On (Oxygen Delivery Method): room air        CONSTITUTIONAL: NAD  ENMT: Moist oral mucosa, no pharyngeal injection or exudates  RESPIRATORY: right chest tube. decrased breath sounds in the bases.   CARDIOVASCULAR: Regular rate and rhythm, normal S1 and S2,  No lower extremity edema;  ABDOMEN: Nontender to palpation, normoactive bowel sounds, no rebound/guarding;   MUSCLOSKELETAL:  no joint swelling or tenderness to palpation  PSYCH: A+O to person, place, and time; affect appropriate  NEUROLOGY: CN 2-12 are intact and symmetric; no gross sensory deficits;   SKIN: No rashes; no palpable lesions    LABS:                        12.9   10.20 )-----------( 440      ( 24 Jan 2024 06:45 )             39.3     01-24    131<L>  |  95<L>  |  13.0  ----------------------------<  199<H>  4.1   |  23.0  |  0.55    Ca    8.7      24 Jan 2024 06:45  Mg     1.7     01-24    TPro  7.6  /  Alb  2.5<L>  /  TBili  0.3<L>  /  DBili  x   /  AST  18  /  ALT  22  /  AlkPhos  95  01-24          Urinalysis Basic - ( 24 Jan 2024 06:45 )    Color: x / Appearance: x / SG: x / pH: x  Gluc: 199 mg/dL / Ketone: x  / Bili: x / Urobili: x   Blood: x / Protein: x / Nitrite: x   Leuk Esterase: x / RBC: x / WBC x   Sq Epi: x / Non Sq Epi: x / Bacteria: x        CAPILLARY BLOOD GLUCOSE      POCT Blood Glucose.: 244 mg/dL (24 Jan 2024 12:05)  POCT Blood Glucose.: 206 mg/dL (24 Jan 2024 08:08)  POCT Blood Glucose.: 156 mg/dL (23 Jan 2024 21:28)  POCT Blood Glucose.: 140 mg/dL (23 Jan 2024 17:06)        RADIOLOGY & ADDITIONAL TESTS:  Results Reviewed:   Imaging Personally Reviewed:  Electrocardiogram Personally Reviewed:

## 2024-01-24 NOTE — PROGRESS NOTE ADULT - SUBJECTIVE AND OBJECTIVE BOX
[facility]  INFECTIOUS DISEASES at [place] / [place] / [place]  =======================================================                               Raymond Dickinson MD#   Nat Ashraf MD*                             Holli Barba MD*   Wendi Rob MD*                              Professor Emeritus:  Dr Ranjith Alicia MD^            Diplomates American Board of Internal Medicine & Infectious Diseases                # [place] Office - Appt - Tel  [phone] Fax [phone]                * [place] Office - Appt - Tel [phone] Fax [phone]                      ^[place] Office - Tel  [phone] Fax [phone]                                  Hospital Consult line:  [phone]  =======================================================    NINFA BLANCHARD [id number]    Follow up: Staphylococcus aureus empyema     no complaints       Allergies:  No Known Allergies       REVIEW OF SYSTEMS:  CONSTITUTIONAL:  No Fever or chills  HEENT:  No diplopia or blurred vision.  No earache, sore throat or runny nose.  CARDIOVASCULAR:  No chest pain  RESPIRATORY:  No cough,. + shortness of breath  GASTROINTESTINAL:  No nausea, vomiting or diarrhea.  GENITOURINARY:  No dysuria, frequency or urgency. No Blood in urine  MUSCULOSKELETAL:  no joint aches, no muscle pain  SKIN:  No change in skin, hair or nails.  NEUROLOGIC:  No Headaches    Physical Exam:  GEN: NAD  HEENT: normocephalic and atraumatic. EOMI. PERRL.    NECK: Supple.   LUNGS: Decreased basal BS B/L   HEART: RRR  ABDOMEN: Soft, NT, ND.  +BS.    : No CVA tenderness  EXTREMITIES: Without  edema.  MSK: No joint swelling  NEUROLOGIC: No Focal Deficits   PSYCHIATRIC: Appropriate affect .  SKIN: No rash      Vitals:  T(F): 98.6 (24 Jan 2024 08:06), Max: 98.9 (23 Jan 2024 20:00)  HR: 80 (24 Jan 2024 08:06)  BP: 103/73 (24 Jan 2024 08:06)  RR: 17 (24 Jan 2024 08:06)  SpO2: 92% (24 Jan 2024 08:06) (92% - 94%)  temp max in last 48H T(F): , Max: 98.9 (01-23-24 @ 20:00)      Current Antibiotics:  piperacillin/tazobactam IVPB.. 3.375 Gram(s) IV Intermittent every 8 hours    Other medications:  dextrose 5%. 1000 milliLiter(s) IV Continuous <Continuous>  dextrose 5%. 1000 milliLiter(s) IV Continuous <Continuous>  dextrose 50% Injectable 25 Gram(s) IV Push once  dextrose 50% Injectable 12.5 Gram(s) IV Push once  dextrose 50% Injectable 25 Gram(s) IV Push once  enoxaparin Injectable 40 milliGRAM(s) SubCutaneous every 24 hours  glucagon  Injectable 1 milliGRAM(s) IntraMuscular once  insulin glargine Injectable (LANTUS) 35 Unit(s) SubCutaneous at bedtime  insulin lispro (ADMELOG) corrective regimen sliding scale   SubCutaneous three times a day before meals  insulin lispro (ADMELOG) corrective regimen sliding scale   SubCutaneous at bedtime  insulin lispro Injectable (ADMELOG) 16 Unit(s) SubCutaneous three times a day before meals  lisinopril 10 milliGRAM(s) Oral daily  nicotine -  14 mG/24Hr(s) Patch 1 Patch Transdermal daily                 12.9   10.20 )-----------( 440      ( 24 Jan 2024 06:45 )             39.3     01-24    131<L>  |  95<L>  |  13.0  ----------------------------<  199<H>  4.1   |  23.0  |  0.55    Ca    8.7      24 Jan 2024 06:45  Mg     1.7     01-24    TPro  7.6  /  Alb  2.5<L>  /  TBili  0.3<L>  /  DBili  x   /  AST  18  /  ALT  22  /  AlkPhos  95  01-24    RECENT CULTURES:  01-19 @ 16:23 Pleural Fl Pleural Fluid Staphylococcus aureus    Numerous Staphylococcus aureus  polymorphonuclear leukocytes seen  Gram positive cocci in pairs seen  by cytocentrifuge    01-19 @ 07:50 Clean Catch Clean Catch (Midstream)     <10,000 CFU/mL Normal Urogenital Louise    01-18 @ 18:15 .Blood Blood     No growth at 5 days    01-18 @ 18:00 .Blood Blood     No growth at 5 days    01-18 @ 17:50    RVP  Detected  SARS-CoV-2: Detected    WBC Count: 10.20 K/uL (01-24-24 @ 06:45)  WBC Count: 9.70 K/uL (01-23-24 @ 06:56)  WBC Count: 10.54 K/uL (01-22-24 @ 06:56)  WBC Count: 12.76 K/uL (01-21-24 @ 13:05)    Creatinine: 0.55 mg/dL (01-24-24 @ 06:45)  Creatinine: 0.77 mg/dL (01-23-24 @ 06:56)  Creatinine: 0.78 mg/dL (01-22-24 @ 06:56)  Creatinine: 0.67 mg/dL (01-21-24 @ 13:05)    Procalcitonin, Serum: 0.07 ng/mL (01-24-24 @ 06:45)     SARS-CoV-2: Detected (01-18-24 @ 17:50)

## 2024-01-25 ENCOUNTER — RESULT REVIEW (OUTPATIENT)
Age: 59
End: 2024-01-25

## 2024-01-25 ENCOUNTER — TRANSCRIPTION ENCOUNTER (OUTPATIENT)
Age: 59
End: 2024-01-25

## 2024-01-25 ENCOUNTER — APPOINTMENT (OUTPATIENT)
Dept: THORACIC SURGERY | Facility: HOSPITAL | Age: 59
End: 2024-01-25

## 2024-01-25 LAB
ALBUMIN SERPL ELPH-MCNC: 2.6 G/DL — LOW (ref 3.3–5.2)
ALP SERPL-CCNC: 94 U/L — SIGNIFICANT CHANGE UP (ref 40–120)
ALT FLD-CCNC: 21 U/L — SIGNIFICANT CHANGE UP
ANION GAP SERPL CALC-SCNC: 11 MMOL/L — SIGNIFICANT CHANGE UP (ref 5–17)
ANION GAP SERPL CALC-SCNC: 12 MMOL/L — SIGNIFICANT CHANGE UP (ref 5–17)
APTT BLD: 37.2 SEC — HIGH (ref 24.5–35.6)
AST SERPL-CCNC: 15 U/L — SIGNIFICANT CHANGE UP
BASOPHILS # BLD AUTO: 0.05 K/UL — SIGNIFICANT CHANGE UP (ref 0–0.2)
BASOPHILS # BLD AUTO: 0.06 K/UL — SIGNIFICANT CHANGE UP (ref 0–0.2)
BASOPHILS NFR BLD AUTO: 0.6 % — SIGNIFICANT CHANGE UP (ref 0–2)
BASOPHILS NFR BLD AUTO: 0.7 % — SIGNIFICANT CHANGE UP (ref 0–2)
BILIRUB SERPL-MCNC: 0.4 MG/DL — SIGNIFICANT CHANGE UP (ref 0.4–2)
BLD GP AB SCN SERPL QL: SIGNIFICANT CHANGE UP
BUN SERPL-MCNC: 13.4 MG/DL — SIGNIFICANT CHANGE UP (ref 8–20)
BUN SERPL-MCNC: 15 MG/DL — SIGNIFICANT CHANGE UP (ref 8–20)
CALCIUM SERPL-MCNC: 8.7 MG/DL — SIGNIFICANT CHANGE UP (ref 8.4–10.5)
CALCIUM SERPL-MCNC: 8.8 MG/DL — SIGNIFICANT CHANGE UP (ref 8.4–10.5)
CHLORIDE SERPL-SCNC: 96 MMOL/L — SIGNIFICANT CHANGE UP (ref 96–108)
CHLORIDE SERPL-SCNC: 99 MMOL/L — SIGNIFICANT CHANGE UP (ref 96–108)
CO2 SERPL-SCNC: 24 MMOL/L — SIGNIFICANT CHANGE UP (ref 22–29)
CO2 SERPL-SCNC: 25 MMOL/L — SIGNIFICANT CHANGE UP (ref 22–29)
CREAT SERPL-MCNC: 0.77 MG/DL — SIGNIFICANT CHANGE UP (ref 0.5–1.3)
CREAT SERPL-MCNC: 0.79 MG/DL — SIGNIFICANT CHANGE UP (ref 0.5–1.3)
CULTURE RESULTS: ABNORMAL
EGFR: 102 ML/MIN/1.73M2 — SIGNIFICANT CHANGE UP
EGFR: 103 ML/MIN/1.73M2 — SIGNIFICANT CHANGE UP
EOSINOPHIL # BLD AUTO: 0.2 K/UL — SIGNIFICANT CHANGE UP (ref 0–0.5)
EOSINOPHIL # BLD AUTO: 0.21 K/UL — SIGNIFICANT CHANGE UP (ref 0–0.5)
EOSINOPHIL NFR BLD AUTO: 2.2 % — SIGNIFICANT CHANGE UP (ref 0–6)
EOSINOPHIL NFR BLD AUTO: 2.5 % — SIGNIFICANT CHANGE UP (ref 0–6)
GLUCOSE BLDC GLUCOMTR-MCNC: 184 MG/DL — HIGH (ref 70–99)
GLUCOSE BLDC GLUCOMTR-MCNC: 185 MG/DL — HIGH (ref 70–99)
GLUCOSE BLDC GLUCOMTR-MCNC: 199 MG/DL — HIGH (ref 70–99)
GLUCOSE BLDC GLUCOMTR-MCNC: 208 MG/DL — HIGH (ref 70–99)
GLUCOSE BLDC GLUCOMTR-MCNC: 215 MG/DL — HIGH (ref 70–99)
GLUCOSE BLDC GLUCOMTR-MCNC: 274 MG/DL — HIGH (ref 70–99)
GLUCOSE BLDC GLUCOMTR-MCNC: 343 MG/DL — HIGH (ref 70–99)
GLUCOSE SERPL-MCNC: 190 MG/DL — HIGH (ref 70–99)
GLUCOSE SERPL-MCNC: 300 MG/DL — HIGH (ref 70–99)
HCT VFR BLD CALC: 38.5 % — LOW (ref 39–50)
HCT VFR BLD CALC: 38.8 % — LOW (ref 39–50)
HGB BLD-MCNC: 12.6 G/DL — LOW (ref 13–17)
HGB BLD-MCNC: 13.1 G/DL — SIGNIFICANT CHANGE UP (ref 13–17)
IMM GRANULOCYTES NFR BLD AUTO: 1 % — HIGH (ref 0–0.9)
IMM GRANULOCYTES NFR BLD AUTO: 1.1 % — HIGH (ref 0–0.9)
INR BLD: 1.1 RATIO — SIGNIFICANT CHANGE UP (ref 0.85–1.18)
LYMPHOCYTES # BLD AUTO: 1.65 K/UL — SIGNIFICANT CHANGE UP (ref 1–3.3)
LYMPHOCYTES # BLD AUTO: 1.76 K/UL — SIGNIFICANT CHANGE UP (ref 1–3.3)
LYMPHOCYTES # BLD AUTO: 18.4 % — SIGNIFICANT CHANGE UP (ref 13–44)
LYMPHOCYTES # BLD AUTO: 21 % — SIGNIFICANT CHANGE UP (ref 13–44)
MAGNESIUM SERPL-MCNC: 1.7 MG/DL — LOW (ref 1.8–2.6)
MCHC RBC-ENTMCNC: 30 PG — SIGNIFICANT CHANGE UP (ref 27–34)
MCHC RBC-ENTMCNC: 31.1 PG — SIGNIFICANT CHANGE UP (ref 27–34)
MCHC RBC-ENTMCNC: 32.5 GM/DL — SIGNIFICANT CHANGE UP (ref 32–36)
MCHC RBC-ENTMCNC: 34 GM/DL — SIGNIFICANT CHANGE UP (ref 32–36)
MCV RBC AUTO: 91.4 FL — SIGNIFICANT CHANGE UP (ref 80–100)
MCV RBC AUTO: 92.4 FL — SIGNIFICANT CHANGE UP (ref 80–100)
MONOCYTES # BLD AUTO: 0.94 K/UL — HIGH (ref 0–0.9)
MONOCYTES # BLD AUTO: 1.01 K/UL — HIGH (ref 0–0.9)
MONOCYTES NFR BLD AUTO: 10.5 % — SIGNIFICANT CHANGE UP (ref 2–14)
MONOCYTES NFR BLD AUTO: 12 % — SIGNIFICANT CHANGE UP (ref 2–14)
NEUTROPHILS # BLD AUTO: 5.28 K/UL — SIGNIFICANT CHANGE UP (ref 1.8–7.4)
NEUTROPHILS # BLD AUTO: 6.03 K/UL — SIGNIFICANT CHANGE UP (ref 1.8–7.4)
NEUTROPHILS NFR BLD AUTO: 62.8 % — SIGNIFICANT CHANGE UP (ref 43–77)
NEUTROPHILS NFR BLD AUTO: 67.2 % — SIGNIFICANT CHANGE UP (ref 43–77)
ORGANISM # SPEC MICROSCOPIC CNT: ABNORMAL
ORGANISM # SPEC MICROSCOPIC CNT: SIGNIFICANT CHANGE UP
PLATELET # BLD AUTO: 456 K/UL — HIGH (ref 150–400)
PLATELET # BLD AUTO: 473 K/UL — HIGH (ref 150–400)
POTASSIUM SERPL-MCNC: 4.2 MMOL/L — SIGNIFICANT CHANGE UP (ref 3.5–5.3)
POTASSIUM SERPL-MCNC: 4.6 MMOL/L — SIGNIFICANT CHANGE UP (ref 3.5–5.3)
POTASSIUM SERPL-SCNC: 4.2 MMOL/L — SIGNIFICANT CHANGE UP (ref 3.5–5.3)
POTASSIUM SERPL-SCNC: 4.6 MMOL/L — SIGNIFICANT CHANGE UP (ref 3.5–5.3)
PROT SERPL-MCNC: 7.5 G/DL — SIGNIFICANT CHANGE UP (ref 6.6–8.7)
PROTHROM AB SERPL-ACNC: 12.2 SEC — SIGNIFICANT CHANGE UP (ref 9.5–13)
RBC # BLD: 4.2 M/UL — SIGNIFICANT CHANGE UP (ref 4.2–5.8)
RBC # BLD: 4.21 M/UL — SIGNIFICANT CHANGE UP (ref 4.2–5.8)
RBC # FLD: 11.8 % — SIGNIFICANT CHANGE UP (ref 10.3–14.5)
RBC # FLD: 11.9 % — SIGNIFICANT CHANGE UP (ref 10.3–14.5)
SODIUM SERPL-SCNC: 131 MMOL/L — LOW (ref 135–145)
SODIUM SERPL-SCNC: 136 MMOL/L — SIGNIFICANT CHANGE UP (ref 135–145)
SPECIMEN SOURCE: SIGNIFICANT CHANGE UP
WBC # BLD: 8.4 K/UL — SIGNIFICANT CHANGE UP (ref 3.8–10.5)
WBC # BLD: 8.97 K/UL — SIGNIFICANT CHANGE UP (ref 3.8–10.5)
WBC # FLD AUTO: 8.4 K/UL — SIGNIFICANT CHANGE UP (ref 3.8–10.5)
WBC # FLD AUTO: 8.97 K/UL — SIGNIFICANT CHANGE UP (ref 3.8–10.5)

## 2024-01-25 PROCEDURE — 71045 X-RAY EXAM CHEST 1 VIEW: CPT | Mod: 26

## 2024-01-25 PROCEDURE — 32652 THORACOSCOPY REM TOTL CORTEX: CPT

## 2024-01-25 PROCEDURE — 32652 THORACOSCOPY REM TOTL CORTEX: CPT | Mod: AS

## 2024-01-25 PROCEDURE — 99233 SBSQ HOSP IP/OBS HIGH 50: CPT

## 2024-01-25 PROCEDURE — 88305 TISSUE EXAM BY PATHOLOGIST: CPT | Mod: 26

## 2024-01-25 PROCEDURE — 99232 SBSQ HOSP IP/OBS MODERATE 35: CPT

## 2024-01-25 DEVICE — CHEST DRAIN THORACIC ARGYLE PVC 24FR RIGHT ANGLE: Type: IMPLANTABLE DEVICE | Site: LEFT | Status: FUNCTIONAL

## 2024-01-25 DEVICE — CHEST DRAIN PLEUR-EVAC 24FR STRAIGHT: Type: IMPLANTABLE DEVICE | Site: LEFT | Status: FUNCTIONAL

## 2024-01-25 RX ORDER — SODIUM CHLORIDE 9 MG/ML
500 INJECTION, SOLUTION INTRAVENOUS ONCE
Refills: 0 | Status: COMPLETED | OUTPATIENT
Start: 2024-01-25 | End: 2024-01-25

## 2024-01-25 RX ORDER — HYDROMORPHONE HYDROCHLORIDE 2 MG/ML
0.5 INJECTION INTRAMUSCULAR; INTRAVENOUS; SUBCUTANEOUS
Refills: 0 | Status: DISCONTINUED | OUTPATIENT
Start: 2024-01-25 | End: 2024-01-25

## 2024-01-25 RX ORDER — DEXTROSE 50 % IN WATER 50 %
15 SYRINGE (ML) INTRAVENOUS ONCE
Refills: 0 | Status: DISCONTINUED | OUTPATIENT
Start: 2024-01-25 | End: 2024-01-27

## 2024-01-25 RX ORDER — NAFCILLIN 10 G/100ML
2 INJECTION, POWDER, FOR SOLUTION INTRAVENOUS EVERY 4 HOURS
Refills: 0 | Status: DISCONTINUED | OUTPATIENT
Start: 2024-01-25 | End: 2024-01-26

## 2024-01-25 RX ORDER — ONDANSETRON 8 MG/1
4 TABLET, FILM COATED ORAL ONCE
Refills: 0 | Status: DISCONTINUED | OUTPATIENT
Start: 2024-01-25 | End: 2024-01-25

## 2024-01-25 RX ORDER — ENOXAPARIN SODIUM 100 MG/ML
40 INJECTION SUBCUTANEOUS EVERY 24 HOURS
Refills: 0 | Status: DISCONTINUED | OUTPATIENT
Start: 2024-01-26 | End: 2024-01-31

## 2024-01-25 RX ORDER — ONDANSETRON 8 MG/1
4 TABLET, FILM COATED ORAL EVERY 6 HOURS
Refills: 0 | Status: DISCONTINUED | OUTPATIENT
Start: 2024-01-25 | End: 2024-01-26

## 2024-01-25 RX ORDER — INSULIN GLARGINE 100 [IU]/ML
24 INJECTION, SOLUTION SUBCUTANEOUS AT BEDTIME
Refills: 0 | Status: DISCONTINUED | OUTPATIENT
Start: 2024-01-25 | End: 2024-01-25

## 2024-01-25 RX ORDER — INSULIN LISPRO 100/ML
VIAL (ML) SUBCUTANEOUS
Refills: 0 | Status: DISCONTINUED | OUTPATIENT
Start: 2024-01-25 | End: 2024-01-30

## 2024-01-25 RX ORDER — GLUCAGON INJECTION, SOLUTION 0.5 MG/.1ML
1 INJECTION, SOLUTION SUBCUTANEOUS ONCE
Refills: 0 | Status: DISCONTINUED | OUTPATIENT
Start: 2024-01-25 | End: 2024-01-31

## 2024-01-25 RX ORDER — FENTANYL CITRATE 50 UG/ML
25 INJECTION INTRAVENOUS
Refills: 0 | Status: DISCONTINUED | OUTPATIENT
Start: 2024-01-25 | End: 2024-01-25

## 2024-01-25 RX ORDER — DIPHENHYDRAMINE HCL 50 MG
25 CAPSULE ORAL EVERY 4 HOURS
Refills: 0 | Status: DISCONTINUED | OUTPATIENT
Start: 2024-01-25 | End: 2024-01-26

## 2024-01-25 RX ORDER — SODIUM CHLORIDE 9 MG/ML
1000 INJECTION, SOLUTION INTRAVENOUS
Refills: 0 | Status: DISCONTINUED | OUTPATIENT
Start: 2024-01-25 | End: 2024-01-31

## 2024-01-25 RX ORDER — SODIUM CHLORIDE 9 MG/ML
1000 INJECTION, SOLUTION INTRAVENOUS
Refills: 0 | Status: DISCONTINUED | OUTPATIENT
Start: 2024-01-25 | End: 2024-01-27

## 2024-01-25 RX ORDER — POLYETHYLENE GLYCOL 3350 17 G/17G
17 POWDER, FOR SOLUTION ORAL DAILY
Refills: 0 | Status: DISCONTINUED | OUTPATIENT
Start: 2024-01-25 | End: 2024-01-31

## 2024-01-25 RX ORDER — PANTOPRAZOLE SODIUM 20 MG/1
40 TABLET, DELAYED RELEASE ORAL
Refills: 0 | Status: DISCONTINUED | OUTPATIENT
Start: 2024-01-25 | End: 2024-01-31

## 2024-01-25 RX ORDER — OXYCODONE AND ACETAMINOPHEN 5; 325 MG/1; MG/1
2 TABLET ORAL EVERY 4 HOURS
Refills: 0 | Status: DISCONTINUED | OUTPATIENT
Start: 2024-01-25 | End: 2024-01-25

## 2024-01-25 RX ORDER — FENTANYL CITRATE 50 UG/ML
30 INJECTION INTRAVENOUS
Refills: 0 | Status: DISCONTINUED | OUTPATIENT
Start: 2024-01-25 | End: 2024-01-26

## 2024-01-25 RX ORDER — OXYCODONE AND ACETAMINOPHEN 5; 325 MG/1; MG/1
1 TABLET ORAL EVERY 4 HOURS
Refills: 0 | Status: DISCONTINUED | OUTPATIENT
Start: 2024-01-25 | End: 2024-01-25

## 2024-01-25 RX ORDER — FENTANYL CITRATE 50 UG/ML
20 INJECTION INTRAVENOUS
Refills: 0 | Status: DISCONTINUED | OUTPATIENT
Start: 2024-01-25 | End: 2024-01-26

## 2024-01-25 RX ORDER — NALOXONE HYDROCHLORIDE 4 MG/.1ML
0.1 SPRAY NASAL
Refills: 0 | Status: DISCONTINUED | OUTPATIENT
Start: 2024-01-25 | End: 2024-01-26

## 2024-01-25 RX ORDER — DEXTROSE 50 % IN WATER 50 %
12.5 SYRINGE (ML) INTRAVENOUS ONCE
Refills: 0 | Status: DISCONTINUED | OUTPATIENT
Start: 2024-01-25 | End: 2024-01-31

## 2024-01-25 RX ORDER — INSULIN GLARGINE 100 [IU]/ML
30 INJECTION, SOLUTION SUBCUTANEOUS AT BEDTIME
Refills: 0 | Status: DISCONTINUED | OUTPATIENT
Start: 2024-01-25 | End: 2024-01-31

## 2024-01-25 RX ORDER — DEXTROSE 50 % IN WATER 50 %
25 SYRINGE (ML) INTRAVENOUS ONCE
Refills: 0 | Status: DISCONTINUED | OUTPATIENT
Start: 2024-01-25 | End: 2024-01-31

## 2024-01-25 RX ORDER — SENNA PLUS 8.6 MG/1
2 TABLET ORAL AT BEDTIME
Refills: 0 | Status: DISCONTINUED | OUTPATIENT
Start: 2024-01-25 | End: 2024-01-31

## 2024-01-25 RX ORDER — ACETAMINOPHEN 500 MG
1000 TABLET ORAL EVERY 6 HOURS
Refills: 0 | Status: COMPLETED | OUTPATIENT
Start: 2024-01-25 | End: 2024-01-26

## 2024-01-25 RX ORDER — INSULIN LISPRO 100/ML
8 VIAL (ML) SUBCUTANEOUS
Refills: 0 | Status: DISCONTINUED | OUTPATIENT
Start: 2024-01-25 | End: 2024-01-26

## 2024-01-25 RX ORDER — DEXTROSE 50 % IN WATER 50 %
25 SYRINGE (ML) INTRAVENOUS ONCE
Refills: 0 | Status: DISCONTINUED | OUTPATIENT
Start: 2024-01-25 | End: 2024-01-27

## 2024-01-25 RX ADMIN — SODIUM CHLORIDE 30 MILLILITER(S): 9 INJECTION, SOLUTION INTRAVENOUS at 16:48

## 2024-01-25 RX ADMIN — NAFCILLIN 200 GRAM(S): 10 INJECTION, POWDER, FOR SOLUTION INTRAVENOUS at 11:47

## 2024-01-25 RX ADMIN — FENTANYL CITRATE 30 MILLILITER(S): 50 INJECTION INTRAVENOUS at 15:59

## 2024-01-25 RX ADMIN — NAFCILLIN 200 GRAM(S): 10 INJECTION, POWDER, FOR SOLUTION INTRAVENOUS at 15:21

## 2024-01-25 RX ADMIN — SODIUM CHLORIDE 1000 MILLILITER(S): 9 INJECTION, SOLUTION INTRAVENOUS at 16:48

## 2024-01-25 RX ADMIN — Medication 1 PATCH: at 18:57

## 2024-01-25 RX ADMIN — NAFCILLIN 200 GRAM(S): 10 INJECTION, POWDER, FOR SOLUTION INTRAVENOUS at 05:49

## 2024-01-25 RX ADMIN — Medication 400 MILLIGRAM(S): at 17:57

## 2024-01-25 RX ADMIN — Medication 1000 MILLIGRAM(S): at 18:57

## 2024-01-25 RX ADMIN — Medication 2: at 15:18

## 2024-01-25 RX ADMIN — LISINOPRIL 10 MILLIGRAM(S): 2.5 TABLET ORAL at 05:48

## 2024-01-25 RX ADMIN — Medication 1 PATCH: at 07:00

## 2024-01-25 RX ADMIN — NAFCILLIN 200 GRAM(S): 10 INJECTION, POWDER, FOR SOLUTION INTRAVENOUS at 01:47

## 2024-01-25 RX ADMIN — NAFCILLIN 200 GRAM(S): 10 INJECTION, POWDER, FOR SOLUTION INTRAVENOUS at 22:22

## 2024-01-25 RX ADMIN — Medication 8: at 22:21

## 2024-01-25 RX ADMIN — INSULIN GLARGINE 30 UNIT(S): 100 INJECTION, SOLUTION SUBCUTANEOUS at 22:21

## 2024-01-25 RX ADMIN — FENTANYL CITRATE 30 MILLILITER(S): 50 INJECTION INTRAVENOUS at 14:53

## 2024-01-25 RX ADMIN — NAFCILLIN 200 GRAM(S): 10 INJECTION, POWDER, FOR SOLUTION INTRAVENOUS at 18:46

## 2024-01-25 RX ADMIN — FENTANYL CITRATE 30 MILLILITER(S): 50 INJECTION INTRAVENOUS at 19:13

## 2024-01-25 RX ADMIN — Medication 8 UNIT(S): at 15:19

## 2024-01-25 RX ADMIN — Medication 6: at 08:45

## 2024-01-25 NOTE — PROGRESS NOTE ADULT - ASSESSMENT
58M PMHx DM, HTN, active smoker, who was sent to the ED for abnormal outpatient CT chest. Patient reported left sided CP associated with dry cough and shortness of breath. CT chest showed L. sided pleural effusion and right sided lung nodules. Patient reported decrease appetite and weight loss (unintentional). Endocrine consulted for diabetes management, a1c 14%. He was only on Metformin but stopped it and then resumed it 2 weeks ago.    1. Uncontrolled DM with hyperglycemia  - Hyperglycemic, also NPO for VATS today  - Increase lantus 24 units qhs  - Continue premeal admelog 16 units TID with meals (hold when NPO)  - Moderate sliding scale coverage  - Needs diabetic education and insulin teaching  - Discharge recommendations possibly basal insulin and metformin, pending hospital course  - C-peptide 1.2 with glucose 199/PHILL and islet cell ab pending    2. Pleural effusion  - Maintain left pigtail to suction given empyema, monitor output  - Plan for VATS today, NPO    3. HTN  - Lisinopril 58M PMHx DM, HTN, active smoker, who was sent to the ED for abnormal outpatient CT chest. Patient reported left sided CP associated with dry cough and shortness of breath. CT chest showed L. sided pleural effusion and right sided lung nodules. Patient reported decrease appetite and weight loss (unintentional).    Consulted for diabetes management  Home regimen: Metformin (stopped it, resumed 2 weeks ago)  Current a1c: 14%  Outpatient Endocrinologist: None    1. Uncontrolled DM with hyperglycemia  - Hyperglycemic, also NPO for VATS today  - Increase lantus 24 units qhs  - Continue premeal admelog 16 units TID with meals (hold when NPO)  - Moderate sliding scale coverage  - Needs diabetic education and insulin teaching  - Discharge recommendations possibly basal insulin and metformin, pending hospital course  - C-peptide 1.2 with glucose 199/PHILL and islet cell ab pending    2. Pleural effusion  - Maintain left pigtail to suction given empyema, monitor output  - Plan for VATS today, NPO    3. HTN  - Lisinopril

## 2024-01-25 NOTE — BRIEF OPERATIVE NOTE - NSICDXBRIEFPROCEDURE_GEN_ALL_CORE_FT
PROCEDURES:  Decortication, lung, total, thoracoscopic 25-Jan-2024 13:46:48 Flex bronchoscopy, vats, total decortication left lung, multilevel intercostal nerve block Urmila Moreno

## 2024-01-25 NOTE — PROGRESS NOTE ADULT - ASSESSMENT
59 y/o male with PMH of HTN, DM-2, active tobacco use was sent to the ED for abnormal outpatient CT chest. Patient reported pain on his left side x 4-5days associated with dry cough and shortness of breath. Sent for CT chest which showed pleural effusion and right sided lung nodules. Patient reported decrease appetite and weight loss (unintentional). In the ED, CTA chest: 2 loculated pleural fluid collections on the left; mildly prominent bilateral hilar and mediastinal lymphadenopathy. 1cm irregular nodular consolidation either bronchiolar dilatation or cavitation likely infection vs cavitary neoplasm.     #Empyema   s/p chest tube 1/19  Fluid growing gram (+) cocci, MSSA  CTS following and managing Chest tube, Plan for VATS today  ID consulted, cont Nafcillin   , tele     #HTN   Lisinopril 10mg   Pt counseled on diet/lifestyle modifications     #Hyperglycemia with DM-2   #uncontrolled DM  A1c >14  endo consult appreciated  Continue premeal admelog 16 units TID with meals  Increase lantus 24 units qhs  ISS, FSG qac/qhs  diabetic diet   Pt counseled on diet/lifestyle modifications   Diabetic educator. Patient will need to go home on insulin.     #Hypomagnesemia / hyponatremia due to pseudohyponatremia from hyperglycemia   na levels daily   monitor lytes    #COVID-19 infection   no hypoxia   Completed remdesevir   if hypoxic will start decadron   supportive treatment     dvt ppx: Lovenox    Patient is medically optimized for planned procedure. Class 2 risk. 6.0% 30-day risk of death, MI, or cardiac arrest  Patient to be transferred to CT surgery service post procedure. Medicine will not follow.

## 2024-01-25 NOTE — PROGRESS NOTE ADULT - SUBJECTIVE AND OBJECTIVE BOX
[facility] Division of Hospital Medicine    Chief Complaint:      SUBJECTIVE / OVERNIGHT EVENTS:    Patient denies chest pain, SOB, abd pain, N/V, fever, chills, dysuria or any other complaints. All remainder ROS negative.     MEDICATIONS  (STANDING):    MEDICATIONS  (PRN):        I&O's Summary    24 Jan 2024 07:01  -  25 Jan 2024 07:00  --------------------------------------------------------  IN: 120 mL / OUT: 110 mL / NET: 10 mL        PHYSICAL EXAM:  Vital Signs Last 24 Hrs  T(C): 36.3 (25 Jan 2024 08:46), Max: 37.3 (24 Jan 2024 16:48)  T(F): 97.3 (25 Jan 2024 08:46), Max: 99.1 (24 Jan 2024 16:48)  HR: 79 (25 Jan 2024 08:46) (79 - 91)  BP: 104/71 (25 Jan 2024 08:46) (95/60 - 111/71)  BP(mean): --  RR: 18 (25 Jan 2024 08:46) (17 - 18)  SpO2: 96% (25 Jan 2024 08:46) (95% - 96%)    Parameters below as of 25 Jan 2024 08:46  Patient On (Oxygen Delivery Method): room air            CONSTITUTIONAL: NAD, well-developed, well-groomed  ENMT: Moist oral mucosa, no pharyngeal injection or exudates; normal dentition  RESPIRATORY: Normal respiratory effort; lungs are clear to auscultation bilaterally  CARDIOVASCULAR: Regular rate and rhythm, normal S1 and S2, no murmur/rub/gallop; No lower extremity edema; Peripheral pulses are 2+ bilaterally  ABDOMEN: Nontender to palpation, normoactive bowel sounds, no rebound/guarding; No hepatosplenomegaly  MUSCLOSKELETAL:  Normal gait; no clubbing or cyanosis of digits; no joint swelling or tenderness to palpation  PSYCH: A+O to person, place, and time; affect appropriate  NEUROLOGY: CN 2-12 are intact and symmetric; no gross sensory deficits;   SKIN: No rashes; no palpable lesions    LABS:                        12.6   8.97  )-----------( 456      ( 25 Jan 2024 07:23 )             38.8     01-25    131<L>  |  96  |  15.0  ----------------------------<  300<H>  4.2   |  24.0  |  0.77    Ca    8.8      25 Jan 2024 07:23  Mg     1.7     01-25    TPro  7.5  /  Alb  2.6<L>  /  TBili  0.4  /  DBili  x   /  AST  15  /  ALT  21  /  AlkPhos  94  01-25    PT/INR - ( 25 Jan 2024 07:23 )   PT: 12.2 sec;   INR: 1.10 ratio         PTT - ( 25 Jan 2024 07:23 )  PTT:37.2 sec      Urinalysis Basic - ( 25 Jan 2024 07:23 )    Color: x / Appearance: x / SG: x / pH: x  Gluc: 300 mg/dL / Ketone: x  / Bili: x / Urobili: x   Blood: x / Protein: x / Nitrite: x   Leuk Esterase: x / RBC: x / WBC x   Sq Epi: x / Non Sq Epi: x / Bacteria: x        CAPILLARY BLOOD GLUCOSE      POCT Blood Glucose.: 208 mg/dL (25 Jan 2024 13:36)  POCT Blood Glucose.: 215 mg/dL (25 Jan 2024 12:02)  POCT Blood Glucose.: 274 mg/dL (25 Jan 2024 08:13)  POCT Blood Glucose.: 348 mg/dL (24 Jan 2024 21:49)  POCT Blood Glucose.: 255 mg/dL (24 Jan 2024 17:23)        RADIOLOGY & ADDITIONAL TESTS:  Results Reviewed:   Imaging Personally Reviewed:  Electrocardiogram Personally Reviewed:                                           [facility] Division of Hospital Medicine    Chief Complaint:  sob    SUBJECTIVE / OVERNIGHT EVENTS: patient seen and examined. No complaints. Fot VATs today.     Patient denies chest pain, SOB, abd pain, N/V, fever, chills, dysuria or any other complaints. All remainder ROS negative.     MEDICATIONS  (STANDING):    MEDICATIONS  (PRN):        I&O's Summary    24 Jan 2024 07:01  -  25 Jan 2024 07:00  --------------------------------------------------------  IN: 120 mL / OUT: 110 mL / NET: 10 mL        PHYSICAL EXAM:  Vital Signs Last 24 Hrs  T(C): 36.3 (25 Jan 2024 08:46), Max: 37.3 (24 Jan 2024 16:48)  T(F): 97.3 (25 Jan 2024 08:46), Max: 99.1 (24 Jan 2024 16:48)  HR: 79 (25 Jan 2024 08:46) (79 - 91)  BP: 104/71 (25 Jan 2024 08:46) (95/60 - 111/71)  BP(mean): --  RR: 18 (25 Jan 2024 08:46) (17 - 18)  SpO2: 96% (25 Jan 2024 08:46) (95% - 96%)    Parameters below as of 25 Jan 2024 08:46  Patient On (Oxygen Delivery Method): room air      CONSTITUTIONAL: NAD  ENMT: Moist oral mucosa, no pharyngeal injection or exudates  RESPIRATORY: right chest tube. decrased breath sounds in the bases.   CARDIOVASCULAR: Regular rate and rhythm, normal S1 and S2,  No lower extremity edema;  ABDOMEN: Nontender to palpation, normoactive bowel sounds, no rebound/guarding;   MUSCLOSKELETAL:  no joint swelling or tenderness to palpation  PSYCH: A+O to person, place, and time; affect appropriate  NEUROLOGY: CN 2-12 are intact and symmetric; no gross sensory deficits;   SKIN: No rashes; no palpable lesions    LABS:                        12.6   8.97  )-----------( 456      ( 25 Jan 2024 07:23 )             38.8     01-25    131<L>  |  96  |  15.0  ----------------------------<  300<H>  4.2   |  24.0  |  0.77    Ca    8.8      25 Jan 2024 07:23  Mg     1.7     01-25    TPro  7.5  /  Alb  2.6<L>  /  TBili  0.4  /  DBili  x   /  AST  15  /  ALT  21  /  AlkPhos  94  01-25    PT/INR - ( 25 Jan 2024 07:23 )   PT: 12.2 sec;   INR: 1.10 ratio         PTT - ( 25 Jan 2024 07:23 )  PTT:37.2 sec      Urinalysis Basic - ( 25 Jan 2024 07:23 )    Color: x / Appearance: x / SG: x / pH: x  Gluc: 300 mg/dL / Ketone: x  / Bili: x / Urobili: x   Blood: x / Protein: x / Nitrite: x   Leuk Esterase: x / RBC: x / WBC x   Sq Epi: x / Non Sq Epi: x / Bacteria: x        CAPILLARY BLOOD GLUCOSE      POCT Blood Glucose.: 208 mg/dL (25 Jan 2024 13:36)  POCT Blood Glucose.: 215 mg/dL (25 Jan 2024 12:02)  POCT Blood Glucose.: 274 mg/dL (25 Jan 2024 08:13)  POCT Blood Glucose.: 348 mg/dL (24 Jan 2024 21:49)  POCT Blood Glucose.: 255 mg/dL (24 Jan 2024 17:23)        RADIOLOGY & ADDITIONAL TESTS:  Results Reviewed:   Imaging Personally Reviewed:  Electrocardiogram Personally Reviewed:

## 2024-01-25 NOTE — CHART NOTE - NSCHARTNOTEFT_GEN_A_CORE
Nutrition Note: Consult received for DM education. DM education completed upon initial nutrition assessment 1/22. Attempted to see pt today for any follow up questions. However, pt currently off floor in PACU. Will follow up with pt tomorrow to answer additional questions. RD to remain available. Nutrition Note: Consult received for DM education. DM education completed upon initial nutrition assessment 1/22. Attempted to see pt today for any follow up questions. However, pt currently off floor in PACU. Will follow up with pt tomorrow as feasible to answer additional questions. RD to remain available.

## 2024-01-25 NOTE — PROGRESS NOTE ADULT - SUBJECTIVE AND OBJECTIVE BOX
INTERVAL EVENTS:  Follow up diabetes management  Hyperglycemic    ROS: Denies chest pain, sob, abd pain    MEDICATIONS  (STANDING):  dextrose 5%. 1000 milliLiter(s) (50 mL/Hr) IV Continuous <Continuous>  dextrose 5%. 1000 milliLiter(s) (100 mL/Hr) IV Continuous <Continuous>  dextrose 50% Injectable 25 Gram(s) IV Push once  dextrose 50% Injectable 12.5 Gram(s) IV Push once  dextrose 50% Injectable 25 Gram(s) IV Push once  glucagon  Injectable 1 milliGRAM(s) IntraMuscular once  insulin glargine Injectable (LANTUS) 24 Unit(s) SubCutaneous at bedtime  insulin lispro (ADMELOG) corrective regimen sliding scale   SubCutaneous three times a day before meals  insulin lispro (ADMELOG) corrective regimen sliding scale   SubCutaneous at bedtime  insulin lispro Injectable (ADMELOG) 16 Unit(s) SubCutaneous three times a day before meals  lisinopril 10 milliGRAM(s) Oral daily  nafcillin  IVPB 2 Gram(s) IV Intermittent every 4 hours  nicotine -  14 mG/24Hr(s) Patch 1 Patch Transdermal daily    MEDICATIONS  (PRN):  acetaminophen     Tablet .. 650 milliGRAM(s) Oral every 6 hours PRN Temp greater or equal to 38C (100.4F), Mild Pain (1 - 3)  aluminum hydroxide/magnesium hydroxide/simethicone Suspension 30 milliLiter(s) Oral every 4 hours PRN Dyspepsia  benzonatate 100 milliGRAM(s) Oral three times a day PRN Cough  dextrose Oral Gel 15 Gram(s) Oral once PRN Blood Glucose LESS THAN 70 milliGRAM(s)/deciliter  melatonin 3 milliGRAM(s) Oral at bedtime PRN Insomnia  ondansetron Injectable 4 milliGRAM(s) IV Push every 8 hours PRN Nausea and/or Vomiting  oxyCODONE    IR 5 milliGRAM(s) Oral every 6 hours PRN Severe Pain (7 - 10)    Allergies  No Known Allergies    Vital Signs Last 24 Hrs  T(C): 36.3 (25 Jan 2024 08:46), Max: 37.3 (24 Jan 2024 16:48)  T(F): 97.3 (25 Jan 2024 08:46), Max: 99.1 (24 Jan 2024 16:48)  HR: 79 (25 Jan 2024 08:46) (79 - 91)  BP: 104/71 (25 Jan 2024 08:46) (95/60 - 111/71)  BP(mean): --  RR: 18 (25 Jan 2024 08:46) (17 - 18)  SpO2: 96% (25 Jan 2024 08:46) (95% - 96%)    Parameters below as of 25 Jan 2024 08:46  Patient On (Oxygen Delivery Method): room air    PHYSICAL EXAM:  General: No apparent distress  Neck: Supple, trachea midline, no thyromegaly  Respiratory: Lungs clear bilaterally, chest tube  Cardiac: +S1, S2, no m/r/g  GI: +BS, soft, non tender, non distended  Extremities: No peripheral edema, no pedal lesions  Neuro: A+O X3, no tremor    LABS:                        12.6   8.97  )-----------( 456      ( 25 Jan 2024 07:23 )             38.8     01-25    131<L>  |  96  |  15.0  ----------------------------<  300<H>  4.2   |  24.0  |  0.77    Ca    8.8      25 Jan 2024 07:23  Mg     1.7     01-25    TPro  7.5  /  Alb  2.6<L>  /  TBili  0.4  /  DBili  x   /  AST  15  /  ALT  21  /  AlkPhos  94  01-25    Urinalysis Basic - ( 25 Jan 2024 07:23 )    Color: x / Appearance: x / SG: x / pH: x  Gluc: 300 mg/dL / Ketone: x  / Bili: x / Urobili: x   Blood: x / Protein: x / Nitrite: x   Leuk Esterase: x / RBC: x / WBC x   Sq Epi: x / Non Sq Epi: x / Bacteria: x    POCT Blood Glucose.: 274 mg/dL (01-25-24 @ 08:13)  POCT Blood Glucose.: 348 mg/dL (01-24-24 @ 21:49)  POCT Blood Glucose.: 255 mg/dL (01-24-24 @ 17:23)  POCT Blood Glucose.: 244 mg/dL (01-24-24 @ 12:05)

## 2024-01-26 DIAGNOSIS — J86.9 PYOTHORAX WITHOUT FISTULA: ICD-10-CM

## 2024-01-26 DIAGNOSIS — Z29.9 ENCOUNTER FOR PROPHYLACTIC MEASURES, UNSPECIFIED: ICD-10-CM

## 2024-01-26 LAB
GLUCOSE BLDC GLUCOMTR-MCNC: 148 MG/DL — HIGH (ref 70–99)
GLUCOSE BLDC GLUCOMTR-MCNC: 169 MG/DL — HIGH (ref 70–99)
GLUCOSE BLDC GLUCOMTR-MCNC: 183 MG/DL — HIGH (ref 70–99)
GLUCOSE BLDC GLUCOMTR-MCNC: 242 MG/DL — HIGH (ref 70–99)
GRAM STN FLD: ABNORMAL
ISLET CELL512 AB SER-ACNC: SIGNIFICANT CHANGE UP
NIGHT BLUE STAIN TISS: SIGNIFICANT CHANGE UP
SPECIMEN SOURCE: SIGNIFICANT CHANGE UP
SPECIMEN SOURCE: SIGNIFICANT CHANGE UP

## 2024-01-26 PROCEDURE — 99233 SBSQ HOSP IP/OBS HIGH 50: CPT

## 2024-01-26 PROCEDURE — 99232 SBSQ HOSP IP/OBS MODERATE 35: CPT

## 2024-01-26 PROCEDURE — 99024 POSTOP FOLLOW-UP VISIT: CPT

## 2024-01-26 PROCEDURE — 71045 X-RAY EXAM CHEST 1 VIEW: CPT | Mod: 26

## 2024-01-26 RX ORDER — OXYCODONE HYDROCHLORIDE 5 MG/1
10 TABLET ORAL EVERY 6 HOURS
Refills: 0 | Status: DISCONTINUED | OUTPATIENT
Start: 2024-01-26 | End: 2024-01-31

## 2024-01-26 RX ORDER — OXYCODONE HYDROCHLORIDE 5 MG/1
5 TABLET ORAL EVERY 6 HOURS
Refills: 0 | Status: DISCONTINUED | OUTPATIENT
Start: 2024-01-26 | End: 2024-01-31

## 2024-01-26 RX ORDER — INSULIN LISPRO 100/ML
16 VIAL (ML) SUBCUTANEOUS
Refills: 0 | Status: DISCONTINUED | OUTPATIENT
Start: 2024-01-26 | End: 2024-01-26

## 2024-01-26 RX ORDER — INSULIN LISPRO 100/ML
10 VIAL (ML) SUBCUTANEOUS
Refills: 0 | Status: DISCONTINUED | OUTPATIENT
Start: 2024-01-26 | End: 2024-01-29

## 2024-01-26 RX ORDER — NAFCILLIN 10 G/100ML
2 INJECTION, POWDER, FOR SOLUTION INTRAVENOUS EVERY 4 HOURS
Refills: 0 | Status: DISCONTINUED | OUTPATIENT
Start: 2024-01-26 | End: 2024-01-31

## 2024-01-26 RX ADMIN — Medication 2: at 17:01

## 2024-01-26 RX ADMIN — INSULIN GLARGINE 30 UNIT(S): 100 INJECTION, SOLUTION SUBCUTANEOUS at 22:00

## 2024-01-26 RX ADMIN — NAFCILLIN 200 GRAM(S): 10 INJECTION, POWDER, FOR SOLUTION INTRAVENOUS at 06:09

## 2024-01-26 RX ADMIN — NAFCILLIN 200 GRAM(S): 10 INJECTION, POWDER, FOR SOLUTION INTRAVENOUS at 22:00

## 2024-01-26 RX ADMIN — ENOXAPARIN SODIUM 40 MILLIGRAM(S): 100 INJECTION SUBCUTANEOUS at 05:49

## 2024-01-26 RX ADMIN — Medication 1 PATCH: at 11:48

## 2024-01-26 RX ADMIN — NAFCILLIN 200 GRAM(S): 10 INJECTION, POWDER, FOR SOLUTION INTRAVENOUS at 01:24

## 2024-01-26 RX ADMIN — Medication 1 PATCH: at 07:55

## 2024-01-26 RX ADMIN — Medication 10 UNIT(S): at 17:02

## 2024-01-26 RX ADMIN — NAFCILLIN 200 GRAM(S): 10 INJECTION, POWDER, FOR SOLUTION INTRAVENOUS at 14:02

## 2024-01-26 RX ADMIN — PANTOPRAZOLE SODIUM 40 MILLIGRAM(S): 20 TABLET, DELAYED RELEASE ORAL at 05:48

## 2024-01-26 RX ADMIN — Medication 16 UNIT(S): at 08:46

## 2024-01-26 RX ADMIN — OXYCODONE HYDROCHLORIDE 5 MILLIGRAM(S): 5 TABLET ORAL at 22:59

## 2024-01-26 RX ADMIN — Medication 10 UNIT(S): at 12:31

## 2024-01-26 RX ADMIN — NAFCILLIN 200 GRAM(S): 10 INJECTION, POWDER, FOR SOLUTION INTRAVENOUS at 17:02

## 2024-01-26 RX ADMIN — Medication 1000 MILLIGRAM(S): at 01:15

## 2024-01-26 RX ADMIN — Medication 400 MILLIGRAM(S): at 05:48

## 2024-01-26 RX ADMIN — Medication 400 MILLIGRAM(S): at 00:07

## 2024-01-26 RX ADMIN — Medication 2: at 08:46

## 2024-01-26 RX ADMIN — Medication 400 MILLIGRAM(S): at 12:30

## 2024-01-26 RX ADMIN — Medication 4: at 22:00

## 2024-01-26 RX ADMIN — OXYCODONE HYDROCHLORIDE 5 MILLIGRAM(S): 5 TABLET ORAL at 21:59

## 2024-01-26 RX ADMIN — Medication 1000 MILLIGRAM(S): at 13:30

## 2024-01-26 RX ADMIN — FENTANYL CITRATE 30 MILLILITER(S): 50 INJECTION INTRAVENOUS at 07:31

## 2024-01-26 RX ADMIN — NAFCILLIN 200 GRAM(S): 10 INJECTION, POWDER, FOR SOLUTION INTRAVENOUS at 10:21

## 2024-01-26 NOTE — PROGRESS NOTE ADULT - PROBLEM SELECTOR PLAN 2
+Covid with superimposed bacterial PNA/empyema.  Pulmonary following. Appreciated risk stratification for OR planning.  No hypoxia.  Completed course of Remdesivir.

## 2024-01-26 NOTE — PROGRESS NOTE ADULT - ASSESSMENT
58M PMHx DM, HTN, active smoker, who was sent to the ED for abnormal outpatient CT chest. Patient reported left sided CP associated with dry cough and shortness of breath. CT chest showed L. sided pleural effusion and right sided lung nodules. Patient reported decrease appetite and weight loss (unintentional).    Consulted for diabetes management  Home regimen: Metformin (stopped it, resumed 2 weeks ago)  Current a1c: 14%  Outpatient Endocrinologist: None    1. Uncontrolled DM with hyperglycemia  - Was NPO yesterday for VATS  - Decrease premeal admelog to 10 units TID  - Continue lantus 30 units qhs  - Moderate sliding scale coverage  - Needs diabetic education and insulin teaching  - To be discharged on basal bolus insulin  - C-peptide 1.2 with glucose 199/PHILL and islet cell ab pending  - F/u appt: 2/19 @ 1pm with VAZQUEZ Simmons ([address] [facility])    2. Pleural effusion  - S/p VATS  - Chest tube in place    3. HTN  - Lisinopril

## 2024-01-26 NOTE — PROGRESS NOTE ADULT - ASSESSMENT
58 year old male with a PMHx of HTN, HLD, type 2 DM (on oral agents), active tobacco smoker (3/4PPD), with complaint of recent unintentional weight loss, dry cough, SOB, left sided pleuritic pain X 4-5 days, found with a loculated left sided pleural effusion, bilateral hilar and mediastinal lymphadenopathy, and a 1cm RLL irregular nodular consolidation with central round gas lucency on CTA chest, and +COVID-19 on RVP.  Patient admitted to Medicine with Thoracic Surgery consult called for Left pleural effusion s/p insertion Left pigtail catheter insertion 1/19/24 with purulent drainage noted and Pleural fluid culture + MSSA.  Patient since underwent Flexible bronchoscopy, Left VATS, total decortication of left lung, with multilevel intercostal nerve block on 1/25/24 with Dr. Pelaez for left empyema.

## 2024-01-26 NOTE — PROGRESS NOTE ADULT - PROBLEM SELECTOR PLAN 1
CTA Chest with loculated left sided pleural effusion, bilateral hilar and mediastinal lymphadenopathy, two nodules demonstrating central lucency/cavitation are noted in the right upper and right lower lobes.  Left pigtail placed preoperatively with Pleural fluid + MSSA.  S/p Flexible bronchoscopy, Left VATS, total decortication of left lung, with multilevel intercostal nerve block on 1/25/24 with Dr. Pelaez for left empyema.   Daily CXR.  2 Left chest tubes to remain to suction for 48hrs.   Monitor strict I/Os.   Continuous SpO2 monitoring.   Continue IV Nafcillin per ID.  Trend WBC, monitor for fevers.  Blood and urine culture on admission remain negative.   History of working as a , active smoker, follow up OR cultures/ pathology to r/o malignancy.  Continue Tylenol and Fentanyl PCA for pain control while chest tubes remain in place.   Continue bowel regimen for constipation.   Plan to be discussed / reviewed with Thoracic Surgery attending / team during AM rounds.

## 2024-01-26 NOTE — PROGRESS NOTE ADULT - PROBLEM SELECTOR PLAN 3
HA1c 14.4 on Metformin at home.  Continue fingersticks AC/HS with Lantus, premeal insulin, and sliding scale insulin for BG coverage.   Endocrine following.   Diabetic / consistent carb diet.  Diabetes education.  Will need Insulin on discharge.   Start insulin self-injection teaching.

## 2024-01-26 NOTE — PROGRESS NOTE ADULT - ASSESSMENT
58y  Male with h/o HTN, HLD, type 2 DM (on oral agents), active tobacco smoker (3/4PPD), was sent to the ED for abnormal outpatient CT chest. Patient reported pain on his left side x 4-5 days associated with dry cough and shortness of breath. Here he has been afebrile with leukocytosis to 12k. CT chest which showed pleural effusion and right sided lung nodules. Patient reported decrease appetite and weight loss (unintentional). He has no fever, night sweat, nausea, vomiting, abdominal pain, fall, trauma to the side, change in bowel/urinary habit, recent travel, sick contact. He was round to be COVID 19 positive. Started on Vancomycin, Zosyn. Pleural fluid with MSSA.      Antibiotics Course:  piperacillin/tazobactam 1/19 - 1/24  remdesivir 1/19 - 1/23  vancomycin  1/20 - 1/22  Nafcillin 1/24 - Present       Staphylococcus aureus empyema   Leukocytosis   Acute COVID 19  Active smoker     - OR cultures reporting GPC  - Blood cultures 1/18 no growth   - Pleural Fluid culture 1/19 reporting Staphylococcus aureus  (MSSA)  - RVP/COVID 19 PCR 1/18 + SARS-CoV-2  - Urine Cx 1/19 not significant   - CT Chest 1/21 reporting decreased loculated left pleural effusion. cavitary nodules within right upper and right lower lobe  - CTA Chest 1/18 reporting no PE and loculated left pleural effusion  - UA 1/19 not concerning for UTI  - LDH pleural fluid 7304  - Procalcitonin level 0.07  - Completed 5 days of Remdesivir  1/23/24   - Continue  Nafcillin   - Thoracic surgery following, s/p Flexible bronchoscopy, VATS, total decortication of left lung 1/25/24  - medical clearance done by medicine  - Hold off on PICC/Midline for now unless needed for reasons other than infectious diseases  - Follow up cultures  - Trend Fever  - Trend WBC      Will Follow    Discussed treatment plan with: CT surgery

## 2024-01-26 NOTE — PROGRESS NOTE ADULT - SUBJECTIVE AND OBJECTIVE BOX
POD #1 s/p Flexible bronchoscopy, VATS, total decortication of left lung, multilevel intercostal nerve block     PAST MEDICAL & SURGICAL HISTORY:  Hypertension  DM (diabetes mellitus)  No significant past surgical history    FAMILY HISTORY:  FH: diabetes mellitus (Mother)    Brief Hospital Course: 58 year old male with a PMHx of HTN, HLD, type 2 DM (on oral agents), active tobacco smoker (3/4PPD), with complaint of recent unintentional weight loss, dry cough, SOB, left sided pleuritic pain X 4-5 days, found with a loculated left sided pleural effusion, bilateral hilar and mediastinal lymphadenopathy, and a 1cm RLL irregular nodular consolidation with central round gas lucency on CTA chest, and +COVID-19 on RVP.  Patient admitted to Medicine with Thoracic Surgery consult called for Left pleural effusion s/p insertion Left pigtail catheter insertion 1/19/24 with purulent drainage noted and Pleural fluid culture + MSSA.  Patient since underwent Flexible bronchoscopy, Left VATS, total decortication of left lung, with multilevel intercostal nerve block on 1/25/24 with Dr. Pelaez for left empyema.     Significant recent/past 24 hr events: No overnight events reported.    Subjective: Patient lying in bed in NAD. +Tolerating diet. +Passing gas. +Pain currently controlled. Denies fevers, chills, lightheadedness, dizziness, HA, CP, palpitations, SOB, cough, abdominal pain, N/V, diarrhea, numbness/tingling in extremities, or any other acute complaints. ROS negative x 10 systems except as noted above.    MEDICATIONS  (STANDING):  acetaminophen   IVPB .. 1000 milliGRAM(s) IV Intermittent every 6 hours  enoxaparin Injectable 40 milliGRAM(s) SubCutaneous every 24 hours  fentaNYL PCA (50 MICROgram(s)/mL) 30 milliLiter(s) PCA Continuous  insulin glargine Injectable (LANTUS) 30 Unit(s) SubCutaneous at bedtime  insulin lispro (ADMELOG) corrective regimen sliding scale   SubCutaneous Before meals and at bedtime  insulin lispro Injectable (ADMELOG) 16 Unit(s) SubCutaneous three times a day before meals  lactated ringers. 1000 milliLiter(s) (30 mL/Hr) IV Continuous   nafcillin  IVPB 2 Gram(s) IV Intermittent every 4 hours  pantoprazole    Tablet 40 milliGRAM(s) Oral before breakfast    MEDICATIONS  (PRN):  dextrose Oral Gel 15 Gram(s) Oral once PRN Blood Glucose LESS THAN 70 milliGRAM(s)/deciliter  diphenhydrAMINE Injectable 25 milliGRAM(s) IV Push every 4 hours PRN Pruritus  fentaNYL  PCA (50 MICROgram(s)/mL) Rescue Clinician Bolus 20 MICROGram(s) IV Push every 15 minutes PRN for Pain Scale GREATER THAN 6  naloxone Injectable 0.1 milliGRAM(s) IV Push every 3 minutes PRN For ANY of the following changes in patient status:  A. RR LESS THAN 10 breaths per minute, B. Oxygen saturation LESS THAN 90%, C. Sedation score of 6  ondansetron Injectable 4 milliGRAM(s) IV Push every 6 hours PRN Nausea  polyethylene glycol 3350 17 Gram(s) Oral daily PRN Constipation  senna 2 Tablet(s) Oral at bedtime PRN Constipation    Allergies: No Known Allergies    Vitals   T(C): 37.1 (25 Jan 2024 20:48), Max: 37.1 (25 Jan 2024 20:48)  T(F): 98.7 (25 Jan 2024 20:48), Max: 98.7 (25 Jan 2024 20:48)  HR: 93 (25 Jan 2024 20:48) (76 - 99)  BP: 107/71 (25 Jan 2024 20:48) (78/63 - 111/71)  BP(mean): 69 (25 Jan 2024 15:30) (55 - 87)  RR: 18 (25 Jan 2024 20:48) (13 - 19)  SpO2: 96% (25 Jan 2024 20:48) (95% - 100%)  O2 Parameters below as of 25 Jan 2024 20:48  Patient On (Oxygen Delivery Method): room air    I&O's Detail    24 Jan 2024 07:01  -  25 Jan 2024 07:00  --------------------------------------------------------  IN:    Oral Fluid: 120 mL  Total IN: 120 mL    OUT:    Chest Tube (mL): 110 mL  Total OUT: 110 mL    Total NET: 10 mL      25 Jan 2024 07:01 - 26 Jan 2024 03:07  --------------------------------------------------------  IN:    IV PiggyBack: 100 mL    Lactated Ringers: 90 mL    Lactated Ringers Bolus: 500 mL    Oral Fluid: 360 mL  Total IN: 1050 mL    OUT:    Chest Tube (mL): 10 mL    Chest Tube (mL): 38 mL  Total OUT: 48 mL    Total NET: 1002 mL    Physical Exam  General: Lying in bed in NAD  Neuro: A+O x 3, non-focal, speech clear and intact  HEENT:  NCAT, No conjuctival edema or icterus, no thrush.    Neck:  Supple, trachea midline  Pulm: +Diminished BSs left base, no accessory muscle use noted  Chest: Left pleural CT X 2 to suction all with dressings intact and no air leak, no subQ emphysema  CV: regular/tachy rate, regular rhythm, +S1S2  Abd: soft, NT, ND, + BS  Ext: SINGH x 4, no edema, no cyanosis, distal motor/neuro/circ intact  Skin: warm, dry, perfused  Incisions: Left VATS site C/D/I    LABS                        13.1   8.40  )-----------( 473      ( 25 Jan 2024 14:00 )             38.5     01-25    136  |  99  |  13.4  ----------------------------<  190<H>  4.6   |  25.0  |  0.79    Ca    8.7      25 Jan 2024 14:00  Mg     1.7     01-25    TPro  7.5  /  Alb  2.6<L>  /  TBili  0.4  /  DBili  x   /  AST  15  /  ALT  21  /  AlkPhos  94  01-25    PT/INR - ( 25 Jan 2024 07:23 )   PT: 12.2 sec;   INR: 1.10 ratio      PTT - ( 25 Jan 2024 07:23 )  PTT:37.2 sec    Urinalysis Basic - ( 25 Jan 2024 14:00 )  Color: x / Appearance: x / SG: x / pH: x  Gluc: 190 mg/dL / Ketone: x  / Bili: x / Urobili: x   Blood: x / Protein: x / Nitrite: x   Leuk Esterase: x / RBC: x / WBC x   Sq Epi: x / Non Sq Epi: x / Bacteria: x    POCT Blood Glucose.: 343 mg/dL (01-25-24 @ 21:30)  POCT Blood Glucose.: 184 mg/dL (01-25-24 @ 17:10)  POCT Blood Glucose.: 185 mg/dL (01-25-24 @ 15:14)  POCT Blood Glucose.: 199 mg/dL (01-25-24 @ 13:51)  POCT Blood Glucose.: 208 mg/dL (01-25-24 @ 13:36)  POCT Blood Glucose.: 215 mg/dL (01-25-24 @ 12:02)  POCT Blood Glucose.: 274 mg/dL (01-25-24 @ 08:13)      Last CXR:  < from: Xray Chest 1 View- PORTABLE-Routine (Xray Chest 1 View- PORTABLE-Routine in AM.) (01.24.24 @ 05:02) >  Left chest tube again noted. No pneumothorax. Trace left pleural effusion.  IMPRESSION: Trace left pleural effusion  < end of copied text >

## 2024-01-26 NOTE — PROGRESS NOTE ADULT - SUBJECTIVE AND OBJECTIVE BOX
INTERVAL EVENTS:  Follow up diabetes management    ROS: Denies chest pain, sob, abd pain    MEDICATIONS  (STANDING):  acetaminophen   IVPB .. 1000 milliGRAM(s) IV Intermittent every 6 hours  dextrose 5%. 1000 milliLiter(s) (50 mL/Hr) IV Continuous <Continuous>  dextrose 5%. 1000 milliLiter(s) (100 mL/Hr) IV Continuous <Continuous>  dextrose 50% Injectable 25 Gram(s) IV Push once  dextrose 50% Injectable 25 Gram(s) IV Push once  dextrose 50% Injectable 12.5 Gram(s) IV Push once  enoxaparin Injectable 40 milliGRAM(s) SubCutaneous every 24 hours  glucagon  Injectable 1 milliGRAM(s) IntraMuscular once  insulin glargine Injectable (LANTUS) 30 Unit(s) SubCutaneous at bedtime  insulin lispro (ADMELOG) corrective regimen sliding scale   SubCutaneous Before meals and at bedtime  insulin lispro Injectable (ADMELOG) 10 Unit(s) SubCutaneous three times a day before meals  lactated ringers. 1000 milliLiter(s) (30 mL/Hr) IV Continuous <Continuous>  nafcillin  IVPB 2 Gram(s) IV Intermittent every 4 hours  pantoprazole    Tablet 40 milliGRAM(s) Oral before breakfast    MEDICATIONS  (PRN):  dextrose Oral Gel 15 Gram(s) Oral once PRN Blood Glucose LESS THAN 70 milliGRAM(s)/deciliter  oxyCODONE    IR 5 milliGRAM(s) Oral every 6 hours PRN Moderate Pain (4 - 6)  oxyCODONE    IR 10 milliGRAM(s) Oral every 6 hours PRN Severe Pain (7 - 10)  polyethylene glycol 3350 17 Gram(s) Oral daily PRN Constipation  senna 2 Tablet(s) Oral at bedtime PRN Constipation    Allergies  No Known Allergies    Vital Signs Last 24 Hrs  T(C): 36.8 (26 Jan 2024 08:06), Max: 37.1 (25 Jan 2024 20:48)  T(F): 98.3 (26 Jan 2024 08:06), Max: 98.7 (25 Jan 2024 20:48)  HR: 105 (26 Jan 2024 08:06) (76 - 105)  BP: 98/66 (26 Jan 2024 08:06) (78/63 - 109/68)  BP(mean): 69 (25 Jan 2024 15:30) (55 - 87)  RR: 17 (26 Jan 2024 08:06) (13 - 19)  SpO2: 97% (26 Jan 2024 08:06) (95% - 100%)    Parameters below as of 26 Jan 2024 08:06  Patient On (Oxygen Delivery Method): room air    PHYSICAL EXAM:  General: No apparent distress  Neck: Supple, trachea midline, no thyromegaly  Respiratory: Lungs clear bilaterally, chest tubes in place  Cardiac: +S1, S2, no m/r/g  GI: +BS, soft, non tender, non distended  Extremities: No peripheral edema, no pedal lesions  Neuro: A+O X3, no tremor    LABS:                        13.1   8.40  )-----------( 473      ( 25 Jan 2024 14:00 )             38.5     01-25    136  |  99  |  13.4  ----------------------------<  190<H>  4.6   |  25.0  |  0.79    Ca    8.7      25 Jan 2024 14:00  Mg     1.7     01-25    TPro  7.5  /  Alb  2.6<L>  /  TBili  0.4  /  DBili  x   /  AST  15  /  ALT  21  /  AlkPhos  94  01-25    Urinalysis Basic - ( 25 Jan 2024 14:00 )    Color: x / Appearance: x / SG: x / pH: x  Gluc: 190 mg/dL / Ketone: x  / Bili: x / Urobili: x   Blood: x / Protein: x / Nitrite: x   Leuk Esterase: x / RBC: x / WBC x   Sq Epi: x / Non Sq Epi: x / Bacteria: x    POCT Blood Glucose.: 169 mg/dL (01-26-24 @ 08:11)  POCT Blood Glucose.: 343 mg/dL (01-25-24 @ 21:30)  POCT Blood Glucose.: 184 mg/dL (01-25-24 @ 17:10)  POCT Blood Glucose.: 185 mg/dL (01-25-24 @ 15:14)  POCT Blood Glucose.: 199 mg/dL (01-25-24 @ 13:51)  POCT Blood Glucose.: 208 mg/dL (01-25-24 @ 13:36)  POCT Blood Glucose.: 215 mg/dL (01-25-24 @ 12:02)

## 2024-01-27 LAB
ANION GAP SERPL CALC-SCNC: 13 MMOL/L — SIGNIFICANT CHANGE UP (ref 5–17)
BUN SERPL-MCNC: 9.7 MG/DL — SIGNIFICANT CHANGE UP (ref 8–20)
CALCIUM SERPL-MCNC: 8.1 MG/DL — LOW (ref 8.4–10.5)
CHLORIDE SERPL-SCNC: 93 MMOL/L — LOW (ref 96–108)
CO2 SERPL-SCNC: 24 MMOL/L — SIGNIFICANT CHANGE UP (ref 22–29)
CREAT SERPL-MCNC: 0.64 MG/DL — SIGNIFICANT CHANGE UP (ref 0.5–1.3)
EGFR: 109 ML/MIN/1.73M2 — SIGNIFICANT CHANGE UP
GLUCOSE BLDC GLUCOMTR-MCNC: 170 MG/DL — HIGH (ref 70–99)
GLUCOSE BLDC GLUCOMTR-MCNC: 188 MG/DL — HIGH (ref 70–99)
GLUCOSE BLDC GLUCOMTR-MCNC: 211 MG/DL — HIGH (ref 70–99)
GLUCOSE BLDC GLUCOMTR-MCNC: 225 MG/DL — HIGH (ref 70–99)
GLUCOSE SERPL-MCNC: 220 MG/DL — HIGH (ref 70–99)
HCT VFR BLD CALC: 33.3 % — LOW (ref 39–50)
HGB BLD-MCNC: 10.9 G/DL — LOW (ref 13–17)
MAGNESIUM SERPL-MCNC: 1.6 MG/DL — SIGNIFICANT CHANGE UP (ref 1.6–2.6)
MCHC RBC-ENTMCNC: 30.1 PG — SIGNIFICANT CHANGE UP (ref 27–34)
MCHC RBC-ENTMCNC: 32.7 GM/DL — SIGNIFICANT CHANGE UP (ref 32–36)
MCV RBC AUTO: 92 FL — SIGNIFICANT CHANGE UP (ref 80–100)
PLATELET # BLD AUTO: 413 K/UL — HIGH (ref 150–400)
POTASSIUM SERPL-MCNC: 4 MMOL/L — SIGNIFICANT CHANGE UP (ref 3.5–5.3)
POTASSIUM SERPL-SCNC: 4 MMOL/L — SIGNIFICANT CHANGE UP (ref 3.5–5.3)
RBC # BLD: 3.62 M/UL — LOW (ref 4.2–5.8)
RBC # FLD: 11.9 % — SIGNIFICANT CHANGE UP (ref 10.3–14.5)
SODIUM SERPL-SCNC: 130 MMOL/L — LOW (ref 135–145)
WBC # BLD: 11.64 K/UL — HIGH (ref 3.8–10.5)
WBC # FLD AUTO: 11.64 K/UL — HIGH (ref 3.8–10.5)

## 2024-01-27 PROCEDURE — 71045 X-RAY EXAM CHEST 1 VIEW: CPT | Mod: 26

## 2024-01-27 PROCEDURE — 99024 POSTOP FOLLOW-UP VISIT: CPT

## 2024-01-27 RX ORDER — MAGNESIUM SULFATE 500 MG/ML
2 VIAL (ML) INJECTION ONCE
Refills: 0 | Status: COMPLETED | OUTPATIENT
Start: 2024-01-27 | End: 2024-01-27

## 2024-01-27 RX ADMIN — NAFCILLIN 200 GRAM(S): 10 INJECTION, POWDER, FOR SOLUTION INTRAVENOUS at 22:36

## 2024-01-27 RX ADMIN — NAFCILLIN 200 GRAM(S): 10 INJECTION, POWDER, FOR SOLUTION INTRAVENOUS at 02:33

## 2024-01-27 RX ADMIN — Medication 4: at 22:35

## 2024-01-27 RX ADMIN — NAFCILLIN 200 GRAM(S): 10 INJECTION, POWDER, FOR SOLUTION INTRAVENOUS at 13:46

## 2024-01-27 RX ADMIN — Medication 4: at 08:21

## 2024-01-27 RX ADMIN — NAFCILLIN 200 GRAM(S): 10 INJECTION, POWDER, FOR SOLUTION INTRAVENOUS at 10:16

## 2024-01-27 RX ADMIN — OXYCODONE HYDROCHLORIDE 5 MILLIGRAM(S): 5 TABLET ORAL at 23:26

## 2024-01-27 RX ADMIN — Medication 25 GRAM(S): at 12:25

## 2024-01-27 RX ADMIN — INSULIN GLARGINE 30 UNIT(S): 100 INJECTION, SOLUTION SUBCUTANEOUS at 22:36

## 2024-01-27 RX ADMIN — OXYCODONE HYDROCHLORIDE 10 MILLIGRAM(S): 5 TABLET ORAL at 14:45

## 2024-01-27 RX ADMIN — NAFCILLIN 200 GRAM(S): 10 INJECTION, POWDER, FOR SOLUTION INTRAVENOUS at 17:38

## 2024-01-27 RX ADMIN — Medication 10 UNIT(S): at 12:26

## 2024-01-27 RX ADMIN — Medication 10 UNIT(S): at 17:40

## 2024-01-27 RX ADMIN — OXYCODONE HYDROCHLORIDE 10 MILLIGRAM(S): 5 TABLET ORAL at 09:46

## 2024-01-27 RX ADMIN — OXYCODONE HYDROCHLORIDE 10 MILLIGRAM(S): 5 TABLET ORAL at 08:26

## 2024-01-27 RX ADMIN — Medication 2: at 12:25

## 2024-01-27 RX ADMIN — OXYCODONE HYDROCHLORIDE 10 MILLIGRAM(S): 5 TABLET ORAL at 13:45

## 2024-01-27 RX ADMIN — Medication 2: at 17:39

## 2024-01-27 RX ADMIN — PANTOPRAZOLE SODIUM 40 MILLIGRAM(S): 20 TABLET, DELAYED RELEASE ORAL at 06:06

## 2024-01-27 RX ADMIN — NAFCILLIN 200 GRAM(S): 10 INJECTION, POWDER, FOR SOLUTION INTRAVENOUS at 06:05

## 2024-01-27 RX ADMIN — ENOXAPARIN SODIUM 40 MILLIGRAM(S): 100 INJECTION SUBCUTANEOUS at 06:06

## 2024-01-27 RX ADMIN — Medication 10 UNIT(S): at 08:22

## 2024-01-27 NOTE — PROVIDER CONTACT NOTE (CRITICAL VALUE NOTIFICATION) - SITUATION
[facility] called with a critical for patient for tissue pathology take in OR on 1/25/2024.  tissue culture form 1/25 gram moderate polymorpho-nuclear leukocytes seen in low power field.  Numerous gram positive cocci in pairs seen in low power field

## 2024-01-27 NOTE — PROGRESS NOTE ADULT - SUBJECTIVE AND OBJECTIVE BOX
POD #2 s/p Flexible bronchoscopy, VATS, total decortication of left lung, multilevel intercostal nerve block     PAST MEDICAL & SURGICAL HISTORY:  Hypertension  DM (diabetes mellitus)  No significant past surgical history    FAMILY HISTORY:  FH: diabetes mellitus (Mother)    Brief Hospital Course: 58 year old male with a PMHx of HTN, HLD, type 2 DM (on oral agents), active tobacco smoker (3/4PPD), with complaint of recent unintentional weight loss, dry cough, SOB, left sided pleuritic pain X 4-5 days, found with a loculated left sided pleural effusion, bilateral hilar and mediastinal lymphadenopathy, and a 1cm RLL irregular nodular consolidation with central round gas lucency on CTA chest, and +COVID-19 on RVP.  Patient admitted to Medicine with Thoracic Surgery consult called for Left pleural effusion s/p insertion Left pigtail catheter insertion 1/19/24 with purulent drainage noted and Pleural fluid culture + MSSA.  Patient since underwent Flexible bronchoscopy, Left VATS, total decortication of left lung, with multilevel intercostal nerve block on 1/25/24 with Dr. Pelaez for left empyema.     Significant recent/past 24 hr events: No overnight events reported.    Subjective: Patient lying in bed in NAD. +Tolerating diet. +Passing gas. +Pain currently controlled. Denies fevers, chills, lightheadedness, dizziness, HA, CP, palpitations, SOB, cough, abdominal pain, N/V, diarrhea, numbness/tingling in extremities, or any other acute complaints. ROS negative x 10 systems except as noted above.    MEDICATIONS  (STANDING):  enoxaparin Injectable 40 milliGRAM(s) SubCutaneous every 24 hours  insulin glargine Injectable (LANTUS) 30 Unit(s) SubCutaneous at bedtime  insulin lispro (ADMELOG) corrective regimen sliding scale   SubCutaneous Before meals and at bedtime  insulin lispro Injectable (ADMELOG) 10 Unit(s) SubCutaneous three times a day before meals  nafcillin  IVPB 2 Gram(s) IV Intermittent every 4 hours  pantoprazole    Tablet 40 milliGRAM(s) Oral before breakfast    MEDICATIONS  (PRN):  oxyCODONE    IR 10 milliGRAM(s) Oral every 6 hours PRN Severe Pain (7 - 10)  oxyCODONE    IR 5 milliGRAM(s) Oral every 6 hours PRN Moderate Pain (4 - 6)  polyethylene glycol 3350 17 Gram(s) Oral daily PRN Constipation  senna 2 Tablet(s) Oral at bedtime PRN Constipation    Allergies: No Known Allergies    Vitals   T(C): 36.7 (27 Jan 2024 05:19), Max: 37.6 (26 Jan 2024 16:47)  T(F): 98 (27 Jan 2024 05:19), Max: 99.7 (26 Jan 2024 16:47)  HR: 103 (27 Jan 2024 05:19) (92 - 105)  BP: 102/67 (27 Jan 2024 05:19) (92/53 - 109/64)  RR: 17 (27 Jan 2024 05:19) (17 - 18)  SpO2: 95% (27 Jan 2024 05:19) (92% - 97%)  O2 Parameters below as of 26 Jan 2024 21:30  Patient On (Oxygen Delivery Method): room air    I&O's Detail    25 Jan 2024 07:01  -  26 Jan 2024 07:00  --------------------------------------------------------  IN:    IV PiggyBack: 100 mL    Lactated Ringers: 120 mL    Lactated Ringers Bolus: 500 mL    Oral Fluid: 360 mL  Total IN: 1080 mL    OUT:    Chest Tube (mL): 55 mL    Chest Tube (mL): 182 mL  Total OUT: 237 mL    Total NET: 843 mL      26 Jan 2024 07:01  -  27 Jan 2024 05:47  --------------------------------------------------------  IN:    Lactated Ringers: 60 mL    Oral Fluid: 780 mL  Total IN: 840 mL    OUT:    Chest Tube (mL): 12 mL    Chest Tube (mL): 33 mL  Total OUT: 45 mL    Total NET: 795 mL    Physical Exam  General: Lying in bed in NAD  Neuro: A+O x 3, non-focal, speech clear and intact  HEENT:  NCAT, No conjuctival edema or icterus, no thrush.    Neck:  Supple, trachea midline  Pulm: +Diminished BSs left base, no accessory muscle use noted  Chest: Left pleural CT X 2 to suction all with dressings intact and no air leak, no subQ emphysema  CV: regular/tachy rate, regular rhythm, +S1S2  Abd: soft, NT, ND, + BS  Ext: SINGH x 4, no edema, no cyanosis, distal motor/neuro/circ intact  Skin: warm, dry, perfused  Incisions: Left VATS site C/D/I    LABS                        13.1   8.40  )-----------( 473      ( 25 Jan 2024 14:00 )             38.5     01-25    136  |  99  |  13.4  ----------------------------<  190<H>  4.6   |  25.0  |  0.79    Ca    8.7      25 Jan 2024 14:00  Mg     1.7     01-25    TPro  7.5  /  Alb  2.6<L>  /  TBili  0.4  /  DBili  x   /  AST  15  /  ALT  21  /  AlkPhos  94  01-25    PT/INR - ( 25 Jan 2024 07:23 )   PT: 12.2 sec;   INR: 1.10 ratio      PTT - ( 25 Jan 2024 07:23 )  PTT:37.2 sec    Urinalysis Basic - ( 25 Jan 2024 14:00 )  Color: x / Appearance: x / SG: x / pH: x  Gluc: 190 mg/dL / Ketone: x  / Bili: x / Urobili: x   Blood: x / Protein: x / Nitrite: x   Leuk Esterase: x / RBC: x / WBC x   Sq Epi: x / Non Sq Epi: x / Bacteria: x    POCT Blood Glucose.: 242 mg/dL (01-26-24 @ 21:12)  POCT Blood Glucose.: 183 mg/dL (01-26-24 @ 16:59)  POCT Blood Glucose.: 148 mg/dL (01-26-24 @ 11:59)  POCT Blood Glucose.: 169 mg/dL (01-26-24 @ 08:11)      Last CXR:  < from: Xray Chest 1 View- PORTABLE-Urgent (Xray Chest 1 View- PORTABLE-Urgent .) (01.26.24 @ 05:12) >  Heart normal for projection.  On January 24 there is a catheter left chest tube. This is been removed and presently there are 2 left chest tubes impression. There are some procedural changes concentrated at left base. No pneumothorax.  Follow-up AP chest on January 26, 2024 4:29 AM.  Slight increase in left base effusion.  IMPRESSION: Left chest surgery with 2 left chest tubes inserted. There is a mild increase in left base effusion on the latest film.  < end of copied text >

## 2024-01-27 NOTE — PROVIDER CONTACT NOTE (CRITICAL VALUE NOTIFICATION) - TEST AND RESULT REPORTED:
tissue pathology- gram moderate polymorpho nuclear leukocytes & numerous gram positive cocci in pairs
body fluid culture-gram + cocci

## 2024-01-27 NOTE — PROGRESS NOTE ADULT - PROBLEM SELECTOR PLAN 1
CTA Chest with loculated left sided pleural effusion, bilateral hilar and mediastinal lymphadenopathy, two nodules demonstrating central lucency/cavitation are noted in the right upper and right lower lobes.  Left pigtail placed preoperatively with Pleural fluid + MSSA.  S/p Flexible bronchoscopy, Left VATS, total decortication of left lung, with multilevel intercostal nerve block on 1/25/24 with Dr. Pelaez for left empyema.   Daily CXR.  2 Left chest tubes to remain to suction for 48hrs, with plan to water seal trial today.   Monitor strict I/Os.   Continuous SpO2 monitoring.   Continue IV Nafcillin per ID.  Trend WBC, monitor for fevers.  Blood and urine culture on admission remain negative.   History of working as a , active smoker, follow up OR cultures/ pathology to r/o malignancy.  Continue Tylenol and Oxy IR PRN for pain control while chest tubes remain in place.   Continue bowel regimen for constipation.   Plan to be discussed / reviewed with Thoracic Surgery attending / team during AM rounds.

## 2024-01-28 LAB
-  AMPICILLIN/SULBACTAM: SIGNIFICANT CHANGE UP
-  CEFAZOLIN: SIGNIFICANT CHANGE UP
-  CLINDAMYCIN: SIGNIFICANT CHANGE UP
-  ERYTHROMYCIN: SIGNIFICANT CHANGE UP
-  GENTAMICIN: SIGNIFICANT CHANGE UP
-  OXACILLIN: SIGNIFICANT CHANGE UP
-  PENICILLIN: SIGNIFICANT CHANGE UP
-  RIFAMPIN: SIGNIFICANT CHANGE UP
-  TETRACYCLINE: SIGNIFICANT CHANGE UP
-  TRIMETHOPRIM/SULFAMETHOXAZOLE: SIGNIFICANT CHANGE UP
-  VANCOMYCIN: SIGNIFICANT CHANGE UP
ANION GAP SERPL CALC-SCNC: 11 MMOL/L — SIGNIFICANT CHANGE UP (ref 5–17)
BUN SERPL-MCNC: 9.3 MG/DL — SIGNIFICANT CHANGE UP (ref 8–20)
CALCIUM SERPL-MCNC: 8.3 MG/DL — LOW (ref 8.4–10.5)
CHLORIDE SERPL-SCNC: 96 MMOL/L — SIGNIFICANT CHANGE UP (ref 96–108)
CO2 SERPL-SCNC: 24 MMOL/L — SIGNIFICANT CHANGE UP (ref 22–29)
CREAT SERPL-MCNC: 0.62 MG/DL — SIGNIFICANT CHANGE UP (ref 0.5–1.3)
EGFR: 110 ML/MIN/1.73M2 — SIGNIFICANT CHANGE UP
GLUCOSE BLDC GLUCOMTR-MCNC: 192 MG/DL — HIGH (ref 70–99)
GLUCOSE BLDC GLUCOMTR-MCNC: 194 MG/DL — HIGH (ref 70–99)
GLUCOSE BLDC GLUCOMTR-MCNC: 196 MG/DL — HIGH (ref 70–99)
GLUCOSE BLDC GLUCOMTR-MCNC: 230 MG/DL — HIGH (ref 70–99)
GLUCOSE SERPL-MCNC: 207 MG/DL — HIGH (ref 70–99)
HCT VFR BLD CALC: 31.6 % — LOW (ref 39–50)
HGB BLD-MCNC: 10.8 G/DL — LOW (ref 13–17)
MAGNESIUM SERPL-MCNC: 1.7 MG/DL — SIGNIFICANT CHANGE UP (ref 1.6–2.6)
MCHC RBC-ENTMCNC: 31.3 PG — SIGNIFICANT CHANGE UP (ref 27–34)
MCHC RBC-ENTMCNC: 34.2 GM/DL — SIGNIFICANT CHANGE UP (ref 32–36)
MCV RBC AUTO: 91.6 FL — SIGNIFICANT CHANGE UP (ref 80–100)
METHOD TYPE: SIGNIFICANT CHANGE UP
PLATELET # BLD AUTO: 356 K/UL — SIGNIFICANT CHANGE UP (ref 150–400)
POTASSIUM SERPL-MCNC: 3.9 MMOL/L — SIGNIFICANT CHANGE UP (ref 3.5–5.3)
POTASSIUM SERPL-SCNC: 3.9 MMOL/L — SIGNIFICANT CHANGE UP (ref 3.5–5.3)
RBC # BLD: 3.45 M/UL — LOW (ref 4.2–5.8)
RBC # FLD: 12 % — SIGNIFICANT CHANGE UP (ref 10.3–14.5)
SODIUM SERPL-SCNC: 131 MMOL/L — LOW (ref 135–145)
WBC # BLD: 7.9 K/UL — SIGNIFICANT CHANGE UP (ref 3.8–10.5)
WBC # FLD AUTO: 7.9 K/UL — SIGNIFICANT CHANGE UP (ref 3.8–10.5)

## 2024-01-28 PROCEDURE — 71045 X-RAY EXAM CHEST 1 VIEW: CPT | Mod: 26

## 2024-01-28 RX ORDER — MAGNESIUM SULFATE 500 MG/ML
2 VIAL (ML) INJECTION ONCE
Refills: 0 | Status: COMPLETED | OUTPATIENT
Start: 2024-01-28 | End: 2024-01-28

## 2024-01-28 RX ORDER — ACETAMINOPHEN 500 MG
650 TABLET ORAL EVERY 6 HOURS
Refills: 0 | Status: DISCONTINUED | OUTPATIENT
Start: 2024-01-28 | End: 2024-01-31

## 2024-01-28 RX ORDER — NICOTINE POLACRILEX 2 MG
1 GUM BUCCAL DAILY
Refills: 0 | Status: DISCONTINUED | OUTPATIENT
Start: 2024-01-28 | End: 2024-01-31

## 2024-01-28 RX ADMIN — OXYCODONE HYDROCHLORIDE 10 MILLIGRAM(S): 5 TABLET ORAL at 15:49

## 2024-01-28 RX ADMIN — PANTOPRAZOLE SODIUM 40 MILLIGRAM(S): 20 TABLET, DELAYED RELEASE ORAL at 06:19

## 2024-01-28 RX ADMIN — OXYCODONE HYDROCHLORIDE 10 MILLIGRAM(S): 5 TABLET ORAL at 16:49

## 2024-01-28 RX ADMIN — ENOXAPARIN SODIUM 40 MILLIGRAM(S): 100 INJECTION SUBCUTANEOUS at 06:19

## 2024-01-28 RX ADMIN — Medication 1 PATCH: at 18:46

## 2024-01-28 RX ADMIN — Medication 4: at 22:06

## 2024-01-28 RX ADMIN — Medication 650 MILLIGRAM(S): at 16:49

## 2024-01-28 RX ADMIN — INSULIN GLARGINE 30 UNIT(S): 100 INJECTION, SOLUTION SUBCUTANEOUS at 21:41

## 2024-01-28 RX ADMIN — NAFCILLIN 200 GRAM(S): 10 INJECTION, POWDER, FOR SOLUTION INTRAVENOUS at 21:41

## 2024-01-28 RX ADMIN — Medication 10 UNIT(S): at 12:12

## 2024-01-28 RX ADMIN — NAFCILLIN 200 GRAM(S): 10 INJECTION, POWDER, FOR SOLUTION INTRAVENOUS at 06:19

## 2024-01-28 RX ADMIN — Medication 25 GRAM(S): at 10:11

## 2024-01-28 RX ADMIN — NAFCILLIN 200 GRAM(S): 10 INJECTION, POWDER, FOR SOLUTION INTRAVENOUS at 02:47

## 2024-01-28 RX ADMIN — NAFCILLIN 200 GRAM(S): 10 INJECTION, POWDER, FOR SOLUTION INTRAVENOUS at 10:11

## 2024-01-28 RX ADMIN — NAFCILLIN 200 GRAM(S): 10 INJECTION, POWDER, FOR SOLUTION INTRAVENOUS at 17:34

## 2024-01-28 RX ADMIN — Medication 650 MILLIGRAM(S): at 15:49

## 2024-01-28 RX ADMIN — NAFCILLIN 200 GRAM(S): 10 INJECTION, POWDER, FOR SOLUTION INTRAVENOUS at 13:24

## 2024-01-28 RX ADMIN — Medication 2: at 17:35

## 2024-01-28 RX ADMIN — Medication 10 UNIT(S): at 17:35

## 2024-01-28 RX ADMIN — OXYCODONE HYDROCHLORIDE 5 MILLIGRAM(S): 5 TABLET ORAL at 00:26

## 2024-01-28 RX ADMIN — Medication 2: at 08:18

## 2024-01-28 RX ADMIN — Medication 10 UNIT(S): at 08:18

## 2024-01-28 RX ADMIN — Medication 2: at 12:12

## 2024-01-28 RX ADMIN — Medication 1 PATCH: at 10:12

## 2024-01-28 NOTE — PROGRESS NOTE ADULT - PROBLEM SELECTOR PLAN 1
CTA Chest with loculated left sided pleural effusion, bilateral hilar and mediastinal lymphadenopathy, two nodules demonstrating central lucency/cavitation are noted in the right upper and right lower lobes.  Left pigtail placed preoperatively with Pleural fluid + MSSA.  S/p Flexible bronchoscopy, Left VATS, total decortication of left lung, with multilevel intercostal nerve block on 1/25/24 with Dr. Pelaez for left empyema.   Daily CXR.  2 Left chest tubes to remain to water seal, with plan to potentially remove one later today.    Monitor strict I/Os.   Continuous SpO2 monitoring.   Continue IV Nafcillin per ID.  Trend WBC, monitor for fevers.  Blood and urine culture on admission remain negative.   History of working as a , active smoker, follow up OR cultures/ pathology to r/o malignancy.  Continue Tylenol and Oxy IR PRN for pain control while chest tubes remain in place.   Continue bowel regimen for constipation.   Dispo: Home  Plan to be discussed / reviewed with Thoracic Surgery attending / team during AM rounds.

## 2024-01-28 NOTE — PHYSICAL THERAPY INITIAL EVALUATION ADULT - NSPTDISCHREC_GEN_A_CORE
Pt is independent in bed mobility, transfers, and ambulation. Pt has no need for skilled PT. PT will no longer follow./No skilled PT needs

## 2024-01-28 NOTE — PHYSICAL THERAPY INITIAL EVALUATION ADULT - LEVEL OF INDEPENDENCE: STAIR NEGOTIATION, REHAB EVAL
due to environmental constraints, however pt performs standing marching x 12 reps at bedside with one hand on bedrail Independently.  It is PTs opinion that pt would be modified I on stairs with use of a handrail based on his current functional level./unable to perform

## 2024-01-28 NOTE — PHYSICAL THERAPY INITIAL EVALUATION ADULT - DID THE PATIENT HAVE SURGERY?
s/p flexible bronchoscopy, VATS, total decortication of left lung, multilevel intercostal nerve block./yes

## 2024-01-28 NOTE — PHYSICAL THERAPY INITIAL EVALUATION ADULT - GENERAL OBSERVATIONS, REHAB EVAL
Pt received reclining in bed, bedrails x 4, bed alarm on, CBWR, 2 chest tubes intact, IV intact, telemonitor and  intact. Pt agreeable to PT evaluation.

## 2024-01-28 NOTE — PHYSICAL THERAPY INITIAL EVALUATION ADULT - DIAGNOSIS, PT EVAL
Pt demonstrates no limitations to functional mobility. Pt is Independent in bed mobility, transfers, and ambulation. Pt has no skilled PT needs.

## 2024-01-28 NOTE — PHYSICAL THERAPY INITIAL EVALUATION ADULT - PERTINENT HX OF CURRENT PROBLEM, REHAB EVAL
PMH: HTN, DM, HLD, smoker    Dx: s/p flexible bronchoscopy, VATS, total decortication of left lung, multilevel intercostal nerve block

## 2024-01-28 NOTE — PHYSICAL THERAPY INITIAL EVALUATION ADULT - ADDITIONAL COMMENTS
Pt reports he lives with his wife and daughters in a private home with 6STE with 2 HR and a full flight of stairs inside with 2 HR.     DME: pt has no DME needs.

## 2024-01-28 NOTE — PROGRESS NOTE ADULT - SUBJECTIVE AND OBJECTIVE BOX
POD #3 s/p Flexible bronchoscopy, VATS, total decortication of left lung, multilevel intercostal nerve block     PAST MEDICAL & SURGICAL HISTORY:  Hypertension  DM (diabetes mellitus)  No significant past surgical history    FAMILY HISTORY:  FH: diabetes mellitus (Mother)    Brief Hospital Course: 58 year old male with a PMHx of HTN, HLD, type 2 DM (on oral agents), active tobacco smoker (3/4PPD), with complaint of recent unintentional weight loss, dry cough, SOB, left sided pleuritic pain X 4-5 days, found with a loculated left sided pleural effusion, bilateral hilar and mediastinal lymphadenopathy, and a 1cm RLL irregular nodular consolidation with central round gas lucency on CTA chest, and +COVID-19 on RVP.  Patient admitted to Medicine with Thoracic Surgery consult called for Left pleural effusion s/p insertion Left pigtail catheter insertion 1/19/24 with purulent drainage noted and Pleural fluid culture + MSSA.  Patient since underwent Flexible bronchoscopy, Left VATS, total decortication of left lung, with multilevel intercostal nerve block on 1/25/24 with Dr. Pelaez for left empyema.     Significant recent/past 24 hr events: No overnight events reported.    Subjective: Patient lying in bed in NAD. +Tolerating diet. +Passing gas. +Pain currently controlled. Denies fevers, chills, lightheadedness, dizziness, HA, CP, palpitations, SOB, cough, abdominal pain, N/V, diarrhea, numbness/tingling in extremities, or any other acute complaints. ROS negative x 10 systems except as noted above.    MEDICATIONS  (STANDING):  enoxaparin Injectable 40 milliGRAM(s) SubCutaneous every 24 hours  insulin glargine Injectable (LANTUS) 30 Unit(s) SubCutaneous at bedtime  insulin lispro (ADMELOG) corrective regimen sliding scale   SubCutaneous Before meals and at bedtime  insulin lispro Injectable (ADMELOG) 10 Unit(s) SubCutaneous three times a day before meals  nafcillin  IVPB 2 Gram(s) IV Intermittent every 4 hours  pantoprazole    Tablet 40 milliGRAM(s) Oral before breakfast    MEDICATIONS  (PRN):  oxyCODONE    IR 5 milliGRAM(s) Oral every 6 hours PRN Moderate Pain (4 - 6)  oxyCODONE    IR 10 milliGRAM(s) Oral every 6 hours PRN Severe Pain (7 - 10)  polyethylene glycol 3350 17 Gram(s) Oral daily PRN Constipation  senna 2 Tablet(s) Oral at bedtime PRN Constipation    Allergies: No Known Allergies    Vitals   T(C): 36.9 (28 Jan 2024 05:02), Max: 38 (27 Jan 2024 16:59)  T(F): 98.5 (28 Jan 2024 05:02), Max: 100.4 (27 Jan 2024 16:59)  HR: 83 (28 Jan 2024 05:02) (83 - 99)  BP: 107/65 (28 Jan 2024 05:02) (95/66 - 109/70)  RR: 18 (28 Jan 2024 05:02) (16 - 18)  SpO2: 95% (28 Jan 2024 05:02) (94% - 98%)  O2 Parameters below as of 28 Jan 2024 05:02  Patient On (Oxygen Delivery Method): room air    I&O's Detail    26 Jan 2024 07:01  -  27 Jan 2024 07:00  --------------------------------------------------------  IN:    Lactated Ringers: 60 mL    Oral Fluid: 780 mL  Total IN: 840 mL    OUT:    Chest Tube (mL): 33 mL    Chest Tube (mL): 118 mL  Total OUT: 151 mL    Total NET: 689 mL      27 Jan 2024 07:01  -  28 Jan 2024 05:20  --------------------------------------------------------  IN:    Oral Fluid: 360 mL  Total IN: 360 mL    OUT:    Chest Tube (mL): 10 mL    Chest Tube (mL): 0 mL  Total OUT: 10 mL    Total NET: 350 mL    Physical Exam  General: Lying in bed in NAD  Neuro: A+O x 3, non-focal, speech clear and intact  HEENT:  NCAT, No conjuctival edema or icterus, no thrush.    Neck:  Supple, trachea midline  Pulm: +Diminished BSs left base, no accessory muscle use noted  Chest: Left pleural CT X 2 to water seal all with dressings intact and no air leak, no subQ emphysema  CV: regular/tachy rate, regular rhythm, +S1S2  Abd: soft, NT, ND, + BS  Ext: SINGH x 4, no edema, no cyanosis, distal motor/neuro/circ intact  Skin: warm, dry, perfused  Incisions: Left VATS site C/D/I    LABS                        10.9   11.64 )-----------( 413      ( 27 Jan 2024 06:30 )             33.3     01-27    130<L>  |  93<L>  |  9.7  ----------------------------<  220<H>  4.0   |  24.0  |  0.64    Ca    8.1<L>      27 Jan 2024 06:30  Mg     1.6     01-27    Urinalysis Basic - ( 27 Jan 2024 06:30 )  Color: x / Appearance: x / SG: x / pH: x  Gluc: 220 mg/dL / Ketone: x  / Bili: x / Urobili: x   Blood: x / Protein: x / Nitrite: x   Leuk Esterase: x / RBC: x / WBC x   Sq Epi: x / Non Sq Epi: x / Bacteria: x    POCT Blood Glucose.: 225 mg/dL (01-27-24 @ 21:37)  POCT Blood Glucose.: 170 mg/dL (01-27-24 @ 17:37)  POCT Blood Glucose.: 188 mg/dL (01-27-24 @ 12:07)  POCT Blood Glucose.: 211 mg/dL (01-27-24 @ 08:06)      Last CXR:  < from: Xray Chest 1 View- PORTABLE-Urgent (Xray Chest 1 View- PORTABLE-Urgent .) (01.26.24 @ 05:12) >  IMPRESSION: Left chest surgery with 2 left chest tubes inserted. There is a mild increase in left base effusion on the latest film.  < end of copied text >

## 2024-01-29 LAB
ANION GAP SERPL CALC-SCNC: 13 MMOL/L — SIGNIFICANT CHANGE UP (ref 5–17)
BUN SERPL-MCNC: 10.1 MG/DL — SIGNIFICANT CHANGE UP (ref 8–20)
CALCIUM SERPL-MCNC: 8.5 MG/DL — SIGNIFICANT CHANGE UP (ref 8.4–10.5)
CHLORIDE SERPL-SCNC: 94 MMOL/L — LOW (ref 96–108)
CO2 SERPL-SCNC: 23 MMOL/L — SIGNIFICANT CHANGE UP (ref 22–29)
CREAT SERPL-MCNC: 0.57 MG/DL — SIGNIFICANT CHANGE UP (ref 0.5–1.3)
EGFR: 113 ML/MIN/1.73M2 — SIGNIFICANT CHANGE UP
GAD65 AB SER-MCNC: 0 NMOL/L — SIGNIFICANT CHANGE UP
GLUCOSE BLDC GLUCOMTR-MCNC: 153 MG/DL — HIGH (ref 70–99)
GLUCOSE BLDC GLUCOMTR-MCNC: 158 MG/DL — HIGH (ref 70–99)
GLUCOSE BLDC GLUCOMTR-MCNC: 201 MG/DL — HIGH (ref 70–99)
GLUCOSE BLDC GLUCOMTR-MCNC: 205 MG/DL — HIGH (ref 70–99)
GLUCOSE SERPL-MCNC: 161 MG/DL — HIGH (ref 70–99)
HCT VFR BLD CALC: 31.6 % — LOW (ref 39–50)
HGB BLD-MCNC: 10.7 G/DL — LOW (ref 13–17)
MAGNESIUM SERPL-MCNC: 1.6 MG/DL — SIGNIFICANT CHANGE UP (ref 1.6–2.6)
MCHC RBC-ENTMCNC: 31.3 PG — SIGNIFICANT CHANGE UP (ref 27–34)
MCHC RBC-ENTMCNC: 33.9 GM/DL — SIGNIFICANT CHANGE UP (ref 32–36)
MCV RBC AUTO: 92.4 FL — SIGNIFICANT CHANGE UP (ref 80–100)
PLATELET # BLD AUTO: 387 K/UL — SIGNIFICANT CHANGE UP (ref 150–400)
POTASSIUM SERPL-MCNC: 3.9 MMOL/L — SIGNIFICANT CHANGE UP (ref 3.5–5.3)
POTASSIUM SERPL-SCNC: 3.9 MMOL/L — SIGNIFICANT CHANGE UP (ref 3.5–5.3)
RBC # BLD: 3.42 M/UL — LOW (ref 4.2–5.8)
RBC # FLD: 11.9 % — SIGNIFICANT CHANGE UP (ref 10.3–14.5)
SODIUM SERPL-SCNC: 130 MMOL/L — LOW (ref 135–145)
WBC # BLD: 6.91 K/UL — SIGNIFICANT CHANGE UP (ref 3.8–10.5)
WBC # FLD AUTO: 6.91 K/UL — SIGNIFICANT CHANGE UP (ref 3.8–10.5)

## 2024-01-29 PROCEDURE — 99024 POSTOP FOLLOW-UP VISIT: CPT

## 2024-01-29 PROCEDURE — 99233 SBSQ HOSP IP/OBS HIGH 50: CPT

## 2024-01-29 PROCEDURE — 71045 X-RAY EXAM CHEST 1 VIEW: CPT | Mod: 26

## 2024-01-29 PROCEDURE — 99232 SBSQ HOSP IP/OBS MODERATE 35: CPT

## 2024-01-29 RX ORDER — INSULIN LISPRO 100/ML
10 VIAL (ML) SUBCUTANEOUS
Refills: 0 | Status: DISCONTINUED | OUTPATIENT
Start: 2024-01-30 | End: 2024-01-30

## 2024-01-29 RX ORDER — INSULIN LISPRO 100/ML
12 VIAL (ML) SUBCUTANEOUS
Refills: 0 | Status: DISCONTINUED | OUTPATIENT
Start: 2024-01-29 | End: 2024-01-30

## 2024-01-29 RX ORDER — INSULIN LISPRO 100/ML
10 VIAL (ML) SUBCUTANEOUS
Refills: 0 | Status: DISCONTINUED | OUTPATIENT
Start: 2024-01-29 | End: 2024-01-30

## 2024-01-29 RX ORDER — MAGNESIUM OXIDE 400 MG ORAL TABLET 241.3 MG
400 TABLET ORAL
Refills: 0 | Status: COMPLETED | OUTPATIENT
Start: 2024-01-29 | End: 2024-01-29

## 2024-01-29 RX ADMIN — PANTOPRAZOLE SODIUM 40 MILLIGRAM(S): 20 TABLET, DELAYED RELEASE ORAL at 06:33

## 2024-01-29 RX ADMIN — Medication 2: at 08:47

## 2024-01-29 RX ADMIN — Medication 10 UNIT(S): at 12:44

## 2024-01-29 RX ADMIN — MAGNESIUM OXIDE 400 MG ORAL TABLET 400 MILLIGRAM(S): 241.3 TABLET ORAL at 17:53

## 2024-01-29 RX ADMIN — Medication 4: at 17:54

## 2024-01-29 RX ADMIN — MAGNESIUM OXIDE 400 MG ORAL TABLET 400 MILLIGRAM(S): 241.3 TABLET ORAL at 10:09

## 2024-01-29 RX ADMIN — Medication 10 UNIT(S): at 08:51

## 2024-01-29 RX ADMIN — NAFCILLIN 200 GRAM(S): 10 INJECTION, POWDER, FOR SOLUTION INTRAVENOUS at 10:09

## 2024-01-29 RX ADMIN — ENOXAPARIN SODIUM 40 MILLIGRAM(S): 100 INJECTION SUBCUTANEOUS at 06:32

## 2024-01-29 RX ADMIN — OXYCODONE HYDROCHLORIDE 10 MILLIGRAM(S): 5 TABLET ORAL at 01:26

## 2024-01-29 RX ADMIN — NAFCILLIN 200 GRAM(S): 10 INJECTION, POWDER, FOR SOLUTION INTRAVENOUS at 22:12

## 2024-01-29 RX ADMIN — INSULIN GLARGINE 30 UNIT(S): 100 INJECTION, SOLUTION SUBCUTANEOUS at 22:11

## 2024-01-29 RX ADMIN — Medication 12 UNIT(S): at 17:55

## 2024-01-29 RX ADMIN — OXYCODONE HYDROCHLORIDE 10 MILLIGRAM(S): 5 TABLET ORAL at 23:11

## 2024-01-29 RX ADMIN — OXYCODONE HYDROCHLORIDE 10 MILLIGRAM(S): 5 TABLET ORAL at 02:26

## 2024-01-29 RX ADMIN — NAFCILLIN 200 GRAM(S): 10 INJECTION, POWDER, FOR SOLUTION INTRAVENOUS at 02:15

## 2024-01-29 RX ADMIN — Medication 1 PATCH: at 11:11

## 2024-01-29 RX ADMIN — Medication 2: at 12:49

## 2024-01-29 RX ADMIN — Medication 4: at 22:11

## 2024-01-29 RX ADMIN — OXYCODONE HYDROCHLORIDE 10 MILLIGRAM(S): 5 TABLET ORAL at 22:11

## 2024-01-29 RX ADMIN — OXYCODONE HYDROCHLORIDE 10 MILLIGRAM(S): 5 TABLET ORAL at 12:51

## 2024-01-29 RX ADMIN — Medication 1 PATCH: at 10:09

## 2024-01-29 RX ADMIN — NAFCILLIN 200 GRAM(S): 10 INJECTION, POWDER, FOR SOLUTION INTRAVENOUS at 14:00

## 2024-01-29 RX ADMIN — NAFCILLIN 200 GRAM(S): 10 INJECTION, POWDER, FOR SOLUTION INTRAVENOUS at 17:54

## 2024-01-29 RX ADMIN — NAFCILLIN 200 GRAM(S): 10 INJECTION, POWDER, FOR SOLUTION INTRAVENOUS at 06:33

## 2024-01-29 RX ADMIN — Medication 1 PATCH: at 18:16

## 2024-01-29 NOTE — PROGRESS NOTE ADULT - ASSESSMENT
58M PMHx DM, HTN, active smoker, who was sent to the ED for abnormal outpatient CT chest. Patient reported left sided CP associated with dry cough and shortness of breath. CT chest showed L. sided pleural effusion and right sided lung nodules. Patient reported decrease appetite and weight loss (unintentional).    Consulted for diabetes management  Home regimen: Metformin (stopped it, resumed 2 weeks ago)  Current a1c: 14%  Outpatient Endocrinologist: None    1. Uncontrolled DM with hyperglycemia  - Post prandial hyperglycemia after dinner, increase premeal admelog to 12 units with dinner  - Continue premeal admelog 10 units with breakfasr and lunch  - Continue lantus 30 units qhs  - Moderate sliding scale coverage  - Needs diabetic education and insulin teaching  - To be discharged on basal bolus insulin  - C-peptide 1.2 with glucose 199/PHILL and islet cell ab negative  - F/u appt: 2/19 @ 1pm with VAZQUEZ Simmons ([facility])    2. Pleural effusion  - S/p VATS  - Chest tube in place    3. HTN  - Lisinopril     58M PMHx DM, HTN, active smoker, who was sent to the ED for abnormal outpatient CT chest. Patient reported left sided CP associated with dry cough and shortness of breath. CT chest showed L. sided pleural effusion and right sided lung nodules. Patient reported decrease appetite and weight loss (unintentional).    Consulted for diabetes management  Home regimen: Metformin (stopped it, resumed 2 weeks ago)  Current a1c: 14%  Outpatient Endocrinologist: None    1. Uncontrolled DM with hyperglycemia  - Post prandial hyperglycemia after dinner, increase premeal admelog to 12 units with dinner  - Continue premeal admelog 10 units with breakfast and lunch  - Continue lantus 30 units qhs  - Moderate sliding scale coverage  - Needs diabetic education and insulin teaching  - To be discharged on basal bolus insulin  - C-peptide 1.2 with glucose 199/PHILL and islet cell ab negative  - F/u appt: 2/19 @ 1pm with VAZQUEZ Simmons ([address] [facility])    2. Pleural effusion  - S/p VATS  - Chest tube in place    3. HTN  - Lisinopril

## 2024-01-29 NOTE — PROGRESS NOTE ADULT - ASSESSMENT
58y  Male with h/o HTN, HLD, type 2 DM (on oral agents), active tobacco smoker (3/4PPD), was sent to the ED for abnormal outpatient CT chest. Patient reported pain on his left side x 4-5 days associated with dry cough and shortness of breath. Here he has been afebrile with leukocytosis to 12k. CT chest which showed pleural effusion and right sided lung nodules. Patient reported decrease appetite and weight loss (unintentional). He has no fever, night sweat, nausea, vomiting, abdominal pain, fall, trauma to the side, change in bowel/urinary habit, recent travel, sick contact. He was round to be COVID 19 positive. Started on Vancomycin, Zosyn. Pleural fluid with MSSA.      Antibiotics Course:  piperacillin/tazobactam 1/19 - 1/24  remdesivir 1/19 - 1/23  vancomycin  1/20 - 1/22  Nafcillin 1/24 - Present       Staphylococcus aureus empyema   Leukocytosis   Acute COVID 19  Active smoker   s/p Flexible bronchoscopy, VATS, total decortication of left lung 1/25/24      - OR cultures reporting Staphylococcus aureus  (MSSA)  - Blood cultures 1/18 no growth   - Pleural Fluid culture 1/19 reporting Staphylococcus aureus  (MSSA)  - RVP/COVID 19 PCR 1/18 + SARS-CoV-2  - Urine Cx 1/19 not significant   - CT Chest 1/21 reporting decreased loculated left pleural effusion. cavitary nodules within right upper and right lower lobe  - CTA Chest 1/18 reporting no PE and loculated left pleural effusion  - UA 1/19 not concerning for UTI  - LDH pleural fluid 7304  - Procalcitonin level 0.07  - Completed 5 days of Remdesivir  1/23/24   - Continue  Nafcillin   - medical clearance done by medicine  - Hold off on PICC/Midline for now unless needed for reasons other than infectious diseases  - Follow up cultures  - Trend Fever  - Trend WBC      Will Follow    Discussed treatment plan with: CT surgery

## 2024-01-29 NOTE — PROGRESS NOTE ADULT - PROBLEM SELECTOR PLAN 3
HA1c 14.4 on Metformin at home.  Continue fingersticks AC/HS with Lantus, premeal insulin, and sliding scale insulin for BG coverage.   Endocrine following.   Diabetic / consistent carb diet.  Diabetes education.  Will need Insulin on discharge.   insulin/glucometer teaching

## 2024-01-29 NOTE — PROGRESS NOTE ADULT - SUBJECTIVE AND OBJECTIVE BOX
POD 4 s/p L VATS total decortication intercostal nerve block    Subjective: no complaints denies CP, palpitations, SOB, cough, fever, chills, itchiness/rash, diaphoresis, vision changes, HA, dizziness/lightheadedness, numbness/tingling, abd pain, N/V     T(C): 37.5 (01-29-24 @ 00:21), Max: 38.3 (01-28-24 @ 15:55)  HR: 84 (01-29-24 @ 00:21) (82 - 85)  BP: 124/74 (01-29-24 @ 00:21) (102/72 - 124/74)  RR: 18 (01-29-24 @ 00:21) (16 - 18)  SpO2: 93% (01-29-24 @ 00:21) (93% - 95%)    01-28    131<L>  |  96  |  9.3  ----------------------------<  207<H>  3.9   |  24.0  |  0.62    Ca    8.3<L>      28 Jan 2024 06:10  Mg     1.7     01-28                                 10.8   7.90  )-----------( 356      ( 28 Jan 2024 06:10 )             31.6                    CAPILLARY BLOOD GLUCOSE      POCT Blood Glucose.: 230 mg/dL (28 Jan 2024 21:25)  POCT Blood Glucose.: 194 mg/dL (28 Jan 2024 16:52)  POCT Blood Glucose.: 192 mg/dL (28 Jan 2024 11:58)  POCT Blood Glucose.: 196 mg/dL (28 Jan 2024 08:06)         01-27 @ 07:01  -  01-28 @ 07:00  --------------------------------------------------------  IN:    Oral Fluid: 360 mL  Total IN: 360 mL    OUT:    Chest Tube (mL): 14 mL    Chest Tube (mL): 65 mL  Total OUT: 79 mL  Total NET: 281 mL    01-28 @ 07:01  -  01-29 @ 02:40  --------------------------------------------------------  IN:  Total IN: 0 mL    OUT:    Chest Tube (mL): 25 mL    Chest Tube (mL): 2 mL  Total OUT: 27 mL  Total NET: -27 mL    MEDICATIONS  (STANDING):  dextrose 5%. 1000 milliLiter(s) (50 mL/Hr) IV Continuous <Continuous>  dextrose 5%. 1000 milliLiter(s) (100 mL/Hr) IV Continuous <Continuous>  dextrose 50% Injectable 25 Gram(s) IV Push once  dextrose 50% Injectable 12.5 Gram(s) IV Push once  enoxaparin Injectable 40 milliGRAM(s) SubCutaneous every 24 hours  glucagon  Injectable 1 milliGRAM(s) IntraMuscular once  insulin glargine Injectable (LANTUS) 30 Unit(s) SubCutaneous at bedtime  insulin lispro (ADMELOG) corrective regimen sliding scale   SubCutaneous Before meals and at bedtime  insulin lispro Injectable (ADMELOG) 10 Unit(s) SubCutaneous three times a day before meals  nafcillin  IVPB 2 Gram(s) IV Intermittent every 4 hours  nicotine -  14 mG/24Hr(s) Patch 1 Patch Transdermal daily  pantoprazole    Tablet 40 milliGRAM(s) Oral before breakfast    MEDICATIONS  (PRN):  acetaminophen     Tablet .. 650 milliGRAM(s) Oral every 6 hours PRN Temp greater or equal to 38C (100.4F), Mild Pain (1 - 3)  oxyCODONE    IR 5 milliGRAM(s) Oral every 6 hours PRN Moderate Pain (4 - 6)  oxyCODONE    IR 10 milliGRAM(s) Oral every 6 hours PRN Severe Pain (7 - 10)  polyethylene glycol 3350 17 Gram(s) Oral daily PRN Constipation  senna 2 Tablet(s) Oral at bedtime PRN Constipation    Physical Exam  Neuro: A+O x 3, non-focal, speech clear and intact  Pulm: decreased BS on left, clear on right no wheezing  CV: S1S2 RRR  Abd: +BS soft NT ND  Extrem/MS: no edema/cyanosis  inc: L VATS dsg intact  LCT x2 to waterseal no air leak

## 2024-01-29 NOTE — PROGRESS NOTE ADULT - SUBJECTIVE AND OBJECTIVE BOX
[facility]  INFECTIOUS DISEASES at [place] / [place] / [place]  =======================================================                               Raymond Dickinson MD#   Nat Ashraf MD*                             Holli Barba MD*   Wendi Rob MD*                              Professor Emeritus:  Dr Ranjith Alicia MD^            Diplomates American Board of Internal Medicine & Infectious Diseases                # [place] Office - Appt - Tel  [phone] Fax [phone]                * [place] Office - Appt - Tel [phone] Fax [phone]                      ^[place] Office - Tel  [phone] Fax [phone]                                  Hospital Consult line:  [phone]  =======================================================    NINFA BLANCHARD [id number]    Follow up: Staphylococcus aureus empyema     no complaints       Allergies:  No Known Allergies       REVIEW OF SYSTEMS:  CONSTITUTIONAL:  No Fever or chills  HEENT:  No diplopia or blurred vision.  No earache, sore throat or runny nose.  CARDIOVASCULAR:  No chest pain  RESPIRATORY:  No cough,. + shortness of breath  GASTROINTESTINAL:  No nausea, vomiting or diarrhea.  GENITOURINARY:  No dysuria, frequency or urgency. No Blood in urine  MUSCULOSKELETAL:  no joint aches, no muscle pain  SKIN:  No change in skin, hair or nails.  NEUROLOGIC:  No Headaches    Physical Exam:  GEN: NAD  HEENT: normocephalic and atraumatic. EOMI. PERRL.    NECK: Supple.   LUNGS: Decreased basal BS B/L   HEART: RRR  ABDOMEN: Soft, NT, ND.  +BS.    : No CVA tenderness  EXTREMITIES: Without  edema.  MSK: No joint swelling  NEUROLOGIC: No Focal Deficits   PSYCHIATRIC: Appropriate affect .  SKIN: No rash      Vitals:  T(F): 98.3 (26 Jan 2024 08:06), Max: 98.7 (25 Jan 2024 20:48)  HR: 105 (26 Jan 2024 08:06)  BP: 98/66 (26 Jan 2024 08:06)  RR: 17 (26 Jan 2024 08:06)  SpO2: 97% (26 Jan 2024 08:06) (95% - 100%)  temp max in last 48H T(F): , Max: 99.1 (01-24-24 @ 16:48)    Current Antibiotics:  nafcillin  IVPB 2 Gram(s) IV Intermittent every 4 hours    Other medications:  acetaminophen   IVPB .. 1000 milliGRAM(s) IV Intermittent every 6 hours  dextrose 5%. 1000 milliLiter(s) IV Continuous <Continuous>  dextrose 5%. 1000 milliLiter(s) IV Continuous <Continuous>  dextrose 50% Injectable 25 Gram(s) IV Push once  dextrose 50% Injectable 12.5 Gram(s) IV Push once  dextrose 50% Injectable 25 Gram(s) IV Push once  enoxaparin Injectable 40 milliGRAM(s) SubCutaneous every 24 hours  glucagon  Injectable 1 milliGRAM(s) IntraMuscular once  insulin glargine Injectable (LANTUS) 30 Unit(s) SubCutaneous at bedtime  insulin lispro (ADMELOG) corrective regimen sliding scale   SubCutaneous Before meals and at bedtime  insulin lispro Injectable (ADMELOG) 16 Unit(s) SubCutaneous three times a day before meals  lactated ringers. 1000 milliLiter(s) IV Continuous <Continuous>  pantoprazole    Tablet 40 milliGRAM(s) Oral before breakfast                            13.1   8.40  )-----------( 473      ( 25 Jan 2024 14:00 )             38.5     01-25    136  |  99  |  13.4  ----------------------------<  190<H>  4.6   |  25.0  |  0.79    Ca    8.7      25 Jan 2024 14:00  Mg     1.7     01-25    TPro  7.5  /  Alb  2.6<L>  /  TBili  0.4  /  DBili  x   /  AST  15  /  ALT  21  /  AlkPhos  94  01-25    RECENT CULTURES:  01-25 @ 13:01 .Tissue products of decortication     Moderate polymorphonuclear leukocytes seen per low power field  Numerous Gram positive cocci in pairs seen per oil power field    01-19 @ 16:23 Pleural Fl Pleural Fluid Staphylococcus aureus    Numerous Staphylococcus aureus  polymorphonuclear leukocytes seen  Gram positive cocci in pairs seen  by cytocentrifuge    01-19 @ 07:50 Clean Catch Clean Catch (Midstream)     <10,000 CFU/mL Normal Urogenital Louise    01-18 @ 18:15 .Blood Blood     No growth at 5 days    01-18 @ 18:00 .Blood Blood     No growth at 5 days    01-18 @ 17:50    RVP  Detected  SARS-CoV-2: Detected (01-18-24 @ 17:50)    WBC Count: 8.40 K/uL (01-25-24 @ 14:00)  WBC Count: 8.97 K/uL (01-25-24 @ 07:23)  WBC Count: 10.20 K/uL (01-24-24 @ 06:45)  WBC Count: 9.70 K/uL (01-23-24 @ 06:56)  WBC Count: 10.54 K/uL (01-22-24 @ 06:56)  WBC Count: 12.76 K/uL (01-21-24 @ 13:05)    Creatinine: 0.79 mg/dL (01-25-24 @ 14:00)  Creatinine: 0.77 mg/dL (01-25-24 @ 07:23)  Creatinine: 0.55 mg/dL (01-24-24 @ 06:45)  Creatinine: 0.77 mg/dL (01-23-24 @ 06:56)  Creatinine: 0.78 mg/dL (01-22-24 @ 06:56)  Creatinine: 0.67 mg/dL (01-21-24 @ 13:05)    Procalcitonin, Serum: 0.07 ng/mL (01-24-24 @ 06:45)         < from: Xray Chest 1 View- PORTABLE-Urgent (Xray Chest 1 View- PORTABLE-Urgent .) (01.26.24 @ 05:12) >  ACC: [id number] EXAM:  XR CHEST PORTABLE URGENT 1V   ORDERED BY: VALENTIN CHIN     ACC: [id number] EXAM:  XR CHEST AP OR PA 1V   ORDERED BY: GERMAN CR     PROCEDURE DATE:  01/25/2024      INTERPRETATION:  AP chest on January 25,2024 at 2:19 PM. Patient had   left chest VATS surgery.    Heart normal for projection.    On January 24 there is a catheter left chest tube. This is been removed   and presently there are 2 left chest tubes impression. There are some   procedural changes concentrated at left base. No pneumothorax.    Follow-up AP chest on January 26, 2024 4:29 AM.    Slight increase in left base effusion.    IMPRESSION: Left chest surgery with 2 left chest tubes inserted. There is   a mild increase in left base effusion on the latest film.    --- End of Report ---    < end of copied text >

## 2024-01-30 ENCOUNTER — TRANSCRIPTION ENCOUNTER (OUTPATIENT)
Age: 59
End: 2024-01-30

## 2024-01-30 LAB
ANION GAP SERPL CALC-SCNC: 12 MMOL/L — SIGNIFICANT CHANGE UP (ref 5–17)
BUN SERPL-MCNC: 10.6 MG/DL — SIGNIFICANT CHANGE UP (ref 8–20)
CALCIUM SERPL-MCNC: 9 MG/DL — SIGNIFICANT CHANGE UP (ref 8.4–10.5)
CHLORIDE SERPL-SCNC: 93 MMOL/L — LOW (ref 96–108)
CO2 SERPL-SCNC: 26 MMOL/L — SIGNIFICANT CHANGE UP (ref 22–29)
CREAT SERPL-MCNC: 0.64 MG/DL — SIGNIFICANT CHANGE UP (ref 0.5–1.3)
EGFR: 109 ML/MIN/1.73M2 — SIGNIFICANT CHANGE UP
GLUCOSE BLDC GLUCOMTR-MCNC: 129 MG/DL — HIGH (ref 70–99)
GLUCOSE BLDC GLUCOMTR-MCNC: 150 MG/DL — HIGH (ref 70–99)
GLUCOSE BLDC GLUCOMTR-MCNC: 159 MG/DL — HIGH (ref 70–99)
GLUCOSE BLDC GLUCOMTR-MCNC: 160 MG/DL — HIGH (ref 70–99)
GLUCOSE SERPL-MCNC: 166 MG/DL — HIGH (ref 70–99)
HCT VFR BLD CALC: 35.1 % — LOW (ref 39–50)
HGB BLD-MCNC: 11.8 G/DL — LOW (ref 13–17)
MAGNESIUM SERPL-MCNC: 1.6 MG/DL — SIGNIFICANT CHANGE UP (ref 1.6–2.6)
MCHC RBC-ENTMCNC: 30.7 PG — SIGNIFICANT CHANGE UP (ref 27–34)
MCHC RBC-ENTMCNC: 33.6 GM/DL — SIGNIFICANT CHANGE UP (ref 32–36)
MCV RBC AUTO: 91.4 FL — SIGNIFICANT CHANGE UP (ref 80–100)
PLATELET # BLD AUTO: 472 K/UL — HIGH (ref 150–400)
POTASSIUM SERPL-MCNC: 4.2 MMOL/L — SIGNIFICANT CHANGE UP (ref 3.5–5.3)
POTASSIUM SERPL-SCNC: 4.2 MMOL/L — SIGNIFICANT CHANGE UP (ref 3.5–5.3)
RBC # BLD: 3.84 M/UL — LOW (ref 4.2–5.8)
RBC # FLD: 11.9 % — SIGNIFICANT CHANGE UP (ref 10.3–14.5)
SODIUM SERPL-SCNC: 131 MMOL/L — LOW (ref 135–145)
WBC # BLD: 7.27 K/UL — SIGNIFICANT CHANGE UP (ref 3.8–10.5)
WBC # FLD AUTO: 7.27 K/UL — SIGNIFICANT CHANGE UP (ref 3.8–10.5)

## 2024-01-30 PROCEDURE — 71045 X-RAY EXAM CHEST 1 VIEW: CPT | Mod: 26

## 2024-01-30 PROCEDURE — 99233 SBSQ HOSP IP/OBS HIGH 50: CPT

## 2024-01-30 PROCEDURE — 99024 POSTOP FOLLOW-UP VISIT: CPT

## 2024-01-30 PROCEDURE — 99232 SBSQ HOSP IP/OBS MODERATE 35: CPT

## 2024-01-30 RX ORDER — MAGNESIUM SULFATE 500 MG/ML
2 VIAL (ML) INJECTION ONCE
Refills: 0 | Status: COMPLETED | OUTPATIENT
Start: 2024-01-30 | End: 2024-01-30

## 2024-01-30 RX ORDER — INSULIN LISPRO 100/ML
12 VIAL (ML) SUBCUTANEOUS
Refills: 0 | Status: DISCONTINUED | OUTPATIENT
Start: 2024-01-30 | End: 2024-01-31

## 2024-01-30 RX ORDER — INSULIN LISPRO 100/ML
14 VIAL (ML) SUBCUTANEOUS
Refills: 0 | Status: DISCONTINUED | OUTPATIENT
Start: 2024-01-30 | End: 2024-01-31

## 2024-01-30 RX ORDER — INSULIN LISPRO 100/ML
VIAL (ML) SUBCUTANEOUS
Refills: 0 | Status: DISCONTINUED | OUTPATIENT
Start: 2024-01-30 | End: 2024-01-31

## 2024-01-30 RX ADMIN — INSULIN GLARGINE 30 UNIT(S): 100 INJECTION, SOLUTION SUBCUTANEOUS at 22:10

## 2024-01-30 RX ADMIN — Medication 1 PATCH: at 07:30

## 2024-01-30 RX ADMIN — Medication 12 UNIT(S): at 12:42

## 2024-01-30 RX ADMIN — PANTOPRAZOLE SODIUM 40 MILLIGRAM(S): 20 TABLET, DELAYED RELEASE ORAL at 06:18

## 2024-01-30 RX ADMIN — NAFCILLIN 200 GRAM(S): 10 INJECTION, POWDER, FOR SOLUTION INTRAVENOUS at 18:42

## 2024-01-30 RX ADMIN — NAFCILLIN 200 GRAM(S): 10 INJECTION, POWDER, FOR SOLUTION INTRAVENOUS at 10:41

## 2024-01-30 RX ADMIN — NAFCILLIN 200 GRAM(S): 10 INJECTION, POWDER, FOR SOLUTION INTRAVENOUS at 14:53

## 2024-01-30 RX ADMIN — Medication 1 PATCH: at 22:11

## 2024-01-30 RX ADMIN — NAFCILLIN 200 GRAM(S): 10 INJECTION, POWDER, FOR SOLUTION INTRAVENOUS at 02:48

## 2024-01-30 RX ADMIN — NAFCILLIN 200 GRAM(S): 10 INJECTION, POWDER, FOR SOLUTION INTRAVENOUS at 06:17

## 2024-01-30 RX ADMIN — Medication 1 PATCH: at 11:26

## 2024-01-30 RX ADMIN — ENOXAPARIN SODIUM 40 MILLIGRAM(S): 100 INJECTION SUBCUTANEOUS at 06:18

## 2024-01-30 RX ADMIN — Medication 1 PATCH: at 11:29

## 2024-01-30 RX ADMIN — Medication 2: at 08:58

## 2024-01-30 RX ADMIN — Medication 14 UNIT(S): at 17:54

## 2024-01-30 RX ADMIN — NAFCILLIN 200 GRAM(S): 10 INJECTION, POWDER, FOR SOLUTION INTRAVENOUS at 22:11

## 2024-01-30 RX ADMIN — Medication 10 UNIT(S): at 08:59

## 2024-01-30 RX ADMIN — Medication 50 GRAM(S): at 10:41

## 2024-01-30 RX ADMIN — OXYCODONE HYDROCHLORIDE 5 MILLIGRAM(S): 5 TABLET ORAL at 09:18

## 2024-01-30 RX ADMIN — Medication 1: at 17:55

## 2024-01-30 NOTE — PROGRESS NOTE ADULT - ASSESSMENT
58y  Male with h/o HTN, HLD, type 2 DM (on oral agents), active tobacco smoker (3/4PPD), was sent to the ED for abnormal outpatient CT chest. Patient reported pain on his left side x 4-5 days associated with dry cough and shortness of breath. Here he has been afebrile with leukocytosis to 12k. CT chest which showed pleural effusion and right sided lung nodules. Patient reported decrease appetite and weight loss (unintentional). He has no fever, night sweat, nausea, vomiting, abdominal pain, fall, trauma to the side, change in bowel/urinary habit, recent travel, sick contact. He was round to be COVID 19 positive. Started on Vancomycin, Zosyn. Pleural fluid with MSSA.      Antibiotics Course:  piperacillin/tazobactam 1/19 - 1/24  remdesivir 1/19 - 1/23  vancomycin  1/20 - 1/22  Nafcillin 1/24 - Present       Staphylococcus aureus empyema   Leukocytosis   Acute COVID 19  Active smoker   s/p Flexible bronchoscopy, VATS, total decortication of left lung 1/25/24      - OR cultures reporting Staphylococcus aureus  (MSSA)  - Blood cultures 1/18 no growth   - Pleural Fluid culture 1/19 reporting Staphylococcus aureus  (MSSA)  - RVP/COVID 19 PCR 1/18 + SARS-CoV-2  - Urine Cx 1/19 not significant   - CT Chest 1/21 reporting decreased loculated left pleural effusion. cavitary nodules within right upper and right lower lobe  - CTA Chest 1/18 reporting no PE and loculated left pleural effusion  - UA 1/19 not concerning for UTI  - LDH pleural fluid 7304  - Procalcitonin level 0.07  - Completed 5 days of Remdesivir  1/23/24   - Continue  Nafcillin   - Hold off on PICC/Midline for now unless needed for reasons other than infectious diseases  - Follow up cultures  - Trend Fever  - Trend WBC      Will Follow    Discussed treatment plan with: CT surgery

## 2024-01-30 NOTE — PROGRESS NOTE ADULT - PROBLEM SELECTOR PROBLEM 1
Pleural effusion, left
Pleural effusion, left
Empyema, left
Empyema, left
Pleural effusion, left
Empyema, left
Empyema, left
Pleural effusion, left
Empyema, left
Pleural effusion, left

## 2024-01-30 NOTE — PROGRESS NOTE ADULT - PROBLEM SELECTOR PLAN 1
CTA Chest with loculated left sided pleural effusion, bilateral hilar and mediastinal lymphadenopathy, two nodules demonstrating central lucency/cavitation are noted in the right upper and right lower lobes.  Left pigtail placed preoperatively with Pleural fluid + MSSA.  S/p Flexible bronchoscopy, Left VATS, total decortication of left lung, with multilevel intercostal nerve block on 1/25/24 with Dr. Pelaez for left empyema.   Daily CXR.  1 Left chest tubes to remain to water seal, with plan to potentially remove one later today.    Monitor strict I/Os.   Continuous SpO2 monitoring.   Continue IV Nafcillin per ID.  Trend WBC, monitor for fevers.  Blood and urine culture on admission remain negative.   History of working as a , active smoker, OR cultures +MSSA  ID to determine if ok to switch to PO antibiotics   Continue Tylenol and Oxy IR PRN for pain control while chest tubes remain in place.   Continue bowel regimen for constipation.   Dispo: Home  Plan to be discussed / reviewed with Thoracic Surgery attending / team during AM rounds.

## 2024-01-30 NOTE — PROGRESS NOTE ADULT - ASSESSMENT
58M PMHx DM, HTN, active smoker, who was sent to the ED for abnormal outpatient CT chest. Patient reported left sided CP associated with dry cough and shortness of breath. CT chest showed L. sided pleural effusion and right sided lung nodules. Patient reported decrease appetite and weight loss (unintentional).    Consulted for diabetes management  Home regimen: Metformin (stopped it, resumed 2 weeks ago)  Current a1c: 14%  Outpatient Endocrinologist: None      INCOMPLETE    1. Uncontrolled DM2 with hyperglycemia  - premeal Admelog *** units with dinner  - Continue premeal Admelog *** units with breakfast and lunch  - Continue Lantus *** units qhs  - Moderate sliding scale coverage  - Needs diabetic education and insulin teaching  - To be discharged on basal bolus insulin, please continue to educate on insulin pen administration, hypoglycemia and treatment, diet, exercise, and blood glucose monitoring.  - C-peptide 1.2 with glucose 199/PHILL and islet cell ab negative  - F/u appt: 2/19 @ 1pm with me ([facility])    2. Pleural effusion  - S/p Flexible bronchoscopy, VATS, total decortication of left lung 1/25/24  - Management per CTS    3. COVID/ Pleural Fluid culture 1/19 reporting Staphylococcus aureus (MSSA)  - Completed 5 days of Remdesivir  - Abx management per ID 58M PMHx DM, HTN, active smoker, who was sent to the ED for abnormal outpatient CT chest. Patient reported left sided CP associated with dry cough and shortness of breath. CT chest showed L. sided pleural effusion and right sided lung nodules. Patient reported decrease appetite and weight loss (unintentional).    Consulted for diabetes management  Home regimen: Metformin (stopped it, resumed 2 weeks ago)  Current a1c: 14%  Outpatient Endocrinologist: None      1. Uncontrolled DM2 with hyperglycemia  - Increase premeal Admelog to 12 units before breakfast and lunch   - Increase premeal Admelog to 14 units before dinner  - Continue Lantus 30 units qhs  - Change to low sliding scale coverage  - Needs diabetic education and insulin teaching  - To be discharged on basal bolus insulin, please continue to educate on insulin pen administration, hypoglycemia and treatment, diet, exercise, and blood glucose monitoring.  - C-peptide 1.2 with glucose 199/PHILL and islet cell ab negative  - F/u appt: 2/19 @ 1pm with me ([facility])    2. Pleural effusion  - S/p Flexible bronchoscopy, VATS, total decortication of left lung 1/25/24  - Management per CTS    3. COVID/ Pleural Fluid culture 1/19 reporting Staphylococcus aureus (MSSA)  - Completed 5 days of Remdesivir  - Abx management per ID

## 2024-01-30 NOTE — PROGRESS NOTE ADULT - SUBJECTIVE AND OBJECTIVE BOX
INTERVAL EVENTS:  follow up for diabetes management    ROS: Feeling much better. Endorses good appetite.    MEDICATIONS  (STANDING):  dextrose 5%. 1000 milliLiter(s) (50 mL/Hr) IV Continuous <Continuous>  dextrose 5%. 1000 milliLiter(s) (100 mL/Hr) IV Continuous <Continuous>  dextrose 50% Injectable 25 Gram(s) IV Push once  dextrose 50% Injectable 12.5 Gram(s) IV Push once  enoxaparin Injectable 40 milliGRAM(s) SubCutaneous every 24 hours  glucagon  Injectable 1 milliGRAM(s) IntraMuscular once  insulin glargine Injectable (LANTUS) 30 Unit(s) SubCutaneous at bedtime  insulin lispro (ADMELOG) corrective regimen sliding scale   SubCutaneous Before meals and at bedtime  insulin lispro Injectable (ADMELOG) 10 Unit(s) SubCutaneous before breakfast  insulin lispro Injectable (ADMELOG) 10 Unit(s) SubCutaneous before lunch  insulin lispro Injectable (ADMELOG) 12 Unit(s) SubCutaneous before dinner  magnesium sulfate  IVPB 2 Gram(s) IV Intermittent once  nafcillin  IVPB 2 Gram(s) IV Intermittent every 4 hours  nicotine -  14 mG/24Hr(s) Patch 1 Patch Transdermal daily  pantoprazole    Tablet 40 milliGRAM(s) Oral before breakfast    MEDICATIONS  (PRN):  acetaminophen     Tablet .. 650 milliGRAM(s) Oral every 6 hours PRN Temp greater or equal to 38C (100.4F), Mild Pain (1 - 3)  oxyCODONE    IR 5 milliGRAM(s) Oral every 6 hours PRN Moderate Pain (4 - 6)  oxyCODONE    IR 10 milliGRAM(s) Oral every 6 hours PRN Severe Pain (7 - 10)  polyethylene glycol 3350 17 Gram(s) Oral daily PRN Constipation  senna 2 Tablet(s) Oral at bedtime PRN Constipation      Allergies  No Known Allergies      Vital Signs Last 24 Hrs  T(C): 36.7 (30 Jan 2024 08:06), Max: 37.4 (29 Jan 2024 16:38)  T(F): 98.1 (30 Jan 2024 08:06), Max: 99.3 (29 Jan 2024 16:38)  HR: 77 (30 Jan 2024 08:06) (64 - 87)  BP: 115/76 (30 Jan 2024 08:06) (104/67 - 121/71)  BP(mean): --  RR: 16 (30 Jan 2024 08:06) (16 - 18)  SpO2: 97% (30 Jan 2024 08:06) (94% - 97%)    Parameters below as of 30 Jan 2024 08:06  Patient On (Oxygen Delivery Method): room air        PHYSICAL EXAM:  GENERAL: NAD, comfortable  NECK: Supple; trachea midline  CHEST/LUNG: diminished; No wheezes  HEART: Regular rate and rhythm; s1s2  ABDOMEN: Soft, Nontender  NEURO: AAOx3, no tremor  EXTREMITIES:  no edema        LABS:                        11.8   7.27  )-----------( 472      ( 30 Jan 2024 07:50 )             35.1     01-30    131<L>  |  93<L>  |  10.6  ----------------------------<  166<H>  4.2   |  26.0  |  0.64    Ca    9.0      30 Jan 2024 07:50  Mg     1.6     01-30      Urinalysis Basic - ( 30 Jan 2024 07:50 )    Color: x / Appearance: x / SG: x / pH: x  Gluc: 166 mg/dL / Ketone: x  / Bili: x / Urobili: x   Blood: x / Protein: x / Nitrite: x   Leuk Esterase: x / RBC: x / WBC x   Sq Epi: x / Non Sq Epi: x / Bacteria: x          POCT Blood Glucose.: 159 mg/dL (01-30-24 @ 08:08)  POCT Blood Glucose.: 205 mg/dL (01-29-24 @ 21:42)  POCT Blood Glucose.: 201 mg/dL (01-29-24 @ 17:15)  POCT Blood Glucose.: 153 mg/dL (01-29-24 @ 12:10)

## 2024-01-30 NOTE — PROGRESS NOTE ADULT - ASSESSMENT
58M, PMHx of HTN, HLD, type 2 DM (on oral agents), active tobacco smoker (3/4PPD), with complaint of recent unintentional weight loss, dry cough, SOB, left sided pleuritic pain X 4-5 days, found with a loculated left sided pleural effusion, bilateral hilar and mediastinal lymphadenopathy, and a 1cm RLL irregular nodular consolidation with central round gas lucency on CTA chest, and +COVID-19 on RVP.  Patient admitted to Medicine with Thoracic Surgery consult called for Left pleural effusion s/p insertion Left pigtail catheter insertion 1/19/24 with purulent drainage noted and Pleural fluid culture + MSSA.  Patient since underwent Flexible bronchoscopy, Left VATS, total decortication of left lung, with multilevel intercostal nerve block on 1/25/24 with Dr. Pelaez for left empyema.

## 2024-01-30 NOTE — CHART NOTE - NSCHARTNOTEFT_GEN_A_CORE
Source: Patient [ x]  Family [ ]   other [ ]    Current Diet:  Diet, Consistent Carbohydrate/No Snacks (01-25-24 @ 14:02)    PO intake:  < 50% [ ]   50-75%  [ ]   %  [x ]  other :    Source for PO intake [ x] Patient [ ] family [ ] chart [ ] staff [ ] other    Current Weight:   1/18 177lbs     Pertinent Medications: MEDICATIONS  (STANDING):  dextrose 5%. 1000 milliLiter(s) (50 mL/Hr) IV Continuous <Continuous>  enoxaparin Injectable 40 milliGRAM(s) SubCutaneous every 24 hours  glucagon  Injectable 1 milliGRAM(s) IntraMuscular once  insulin glargine Injectable (LANTUS) 30 Unit(s) SubCutaneous at bedtime  magnesium sulfate  IVPB 2 Gram(s) IV Intermittent once  nafcillin  IVPB 2 Gram(s) IV Intermittent every 4 hours  nicotine -  14 mG/24Hr(s) Patch 1 Patch Transdermal daily  pantoprazole    Tablet 40 milliGRAM(s) Oral before breakfast    MEDICATIONS  (PRN):  polyethylene glycol 3350 17 Gram(s) Oral daily PRN Constipation  senna 2 Tablet(s) Oral at bedtime PRN Constipation    Pertinent Labs: 01-30 Na131 mmol/L<L> Glu 166 mg/dL<H> K+ 4.2 mmol/L Cr  0.64 mg/dL BUN 10.6 mg/dL    Skin: Chest tube /L    Estimated Needs:   [x ] no change since previous assessment  [ ] recalculated:     Current Nutrition Diagnosis: Increased Nutrient Needs protein, calories related to increased physiological demand for healing as evidenced by COVID+. Pt met at bedside and reports PO >100% and no issues with eating. Pt educated on diabetic diet and received paperwork on building a proper plate. Pt reported wife does most of the cooking and will pass along the information. Pt reported he will read over the paper work and reach out if he has any follow up questions. RD to remain available.     Recommendations:   1) Continue diet as tolerated.   2) Rx: MVI daily.   3) Continue to monitor blood sugars and correct as needed.  4) Obtain daily weights to monitor trends.    Monitoring and Evaluation:   [ x] PO intake [x ] Tolerance to diet prescription [X] Weights  [X] Follow up per protocol [X] Labs: Source: Patient [ x]  Family [ ]   other [ ]    Current Diet:  Diet, Consistent Carbohydrate/No Snacks (01-25-24 @ 14:02)    PO intake:  < 50% [ ]   50-75%  [ ]   %  [x ]  other :    Source for PO intake [ x] Patient [ ] family [ ] chart [ ] staff [ ] other    Current Weight:   1/18 177lbs     Pertinent Medications: MEDICATIONS  (STANDING):  dextrose 5%. 1000 milliLiter(s) (50 mL/Hr) IV Continuous <Continuous>  enoxaparin Injectable 40 milliGRAM(s) SubCutaneous every 24 hours  glucagon  Injectable 1 milliGRAM(s) IntraMuscular once  insulin glargine Injectable (LANTUS) 30 Unit(s) SubCutaneous at bedtime  magnesium sulfate  IVPB 2 Gram(s) IV Intermittent once  nafcillin  IVPB 2 Gram(s) IV Intermittent every 4 hours  nicotine -  14 mG/24Hr(s) Patch 1 Patch Transdermal daily  pantoprazole    Tablet 40 milliGRAM(s) Oral before breakfast    MEDICATIONS  (PRN):  polyethylene glycol 3350 17 Gram(s) Oral daily PRN Constipation  senna 2 Tablet(s) Oral at bedtime PRN Constipation    Pertinent Labs: 01-30 Na131 mmol/L<L> Glu 166 mg/dL<H> K+ 4.2 mmol/L Cr  0.64 mg/dL BUN 10.6 mg/dL    Skin: Chest tube /L    Estimated Needs:   [x ] no change since previous assessment  [ ] recalculated:     Hospital Course:   Pt is a 58 year old M, PMHx of HTN, HLD, type 2 DM (on oral agents), active tobacco smoker (3/4PPD), with complaint of recent unintentional weight loss, dry cough, SOB, left sided pleuritic pain X 4-5 days, found with a loculated left sided pleural effusion, bilateral hilar and mediastinal lymphadenopathy, and a 1cm RLL irregular nodular consolidation with central round gas lucency on CTA chest, and +COVID-19 on RVP.  Patient admitted to Medicine with Thoracic Surgery consult called for Left pleural effusion s/p insertion Left pigtail catheter insertion 1/19/24 with purulent drainage noted and Pleural fluid culture + MSSA.  Patient since underwent Flexible bronchoscopy, Left VATS, total decortication of left lung, with multilevel intercostal nerve block on 1/25/24 with Dr. Pelaez for left empyema.       Current Nutrition Diagnosis:   Pt remains at high nutrition risk secondary to Increased Nutrient Needs (protein-energy) related to increased physiological demand for nutrient as evidenced by admission with empyema, s/p Left VATS, total decortication of left lung COVID+. Pt met at bedside and reports PO >100% and no issues with eating. Pt educated on diabetic diet and received paperwork on building a proper plate. Pt reported wife does most of the cooking and will pass along the information. Pt reported he will read over the paper work and reach out if he has any follow up questions. RD to remain available.     Recommendations:   1) Continue diet as tolerated.   2) Rx: MVI daily.   3) Continue to monitor blood sugars and correct as needed.  4) Obtain daily weights to monitor trends.    Monitoring and Evaluation:   [ x] PO intake [x ] Tolerance to diet prescription [X] Weights  [X] Follow up per protocol [X] Labs: Source: Patient [ x]  Family [ ]   other [ ]    Current Diet:  Diet, Consistent Carbohydrate/No Snacks (01-25-24 @ 14:02)    PO intake:  < 50% [ ]   50-75%  [ ]   %  [x ]  other :    Source for PO intake [ x] Patient [ ] family [ ] chart [ ] staff [ ] other    Current Weight:  (Pt endorses weight loss of 7lbs PTA)   1/18 177lbs     Pertinent Medications: MEDICATIONS  (STANDING):  dextrose 5%. 1000 milliLiter(s) (50 mL/Hr) IV Continuous <Continuous>  enoxaparin Injectable 40 milliGRAM(s) SubCutaneous every 24 hours  glucagon  Injectable 1 milliGRAM(s) IntraMuscular once  insulin glargine Injectable (LANTUS) 30 Unit(s) SubCutaneous at bedtime  magnesium sulfate  IVPB 2 Gram(s) IV Intermittent once  nafcillin  IVPB 2 Gram(s) IV Intermittent every 4 hours  nicotine -  14 mG/24Hr(s) Patch 1 Patch Transdermal daily  pantoprazole    Tablet 40 milliGRAM(s) Oral before breakfast    MEDICATIONS  (PRN):  polyethylene glycol 3350 17 Gram(s) Oral daily PRN Constipation  senna 2 Tablet(s) Oral at bedtime PRN Constipation    Pertinent Labs: 01-30 Na131 mmol/L<L> Glu 166 mg/dL<H> K+ 4.2 mmol/L Cr  0.64 mg/dL BUN 10.6 mg/dL    Skin: Chest tube /L    Estimated Needs:   [x ] no change since previous assessment  [ ] recalculated:     Hospital Course:   Pt is a 58 year old M, PMHx of HTN, HLD, type 2 DM (on oral agents), active tobacco smoker (3/4PPD), with complaint of recent unintentional weight loss, dry cough, SOB, left sided pleuritic pain X 4-5 days, found with a loculated left sided pleural effusion, bilateral hilar and mediastinal lymphadenopathy, and a 1cm RLL irregular nodular consolidation with central round gas lucency on CTA chest, and +COVID-19 on RVP.  Patient admitted to Medicine with Thoracic Surgery consult called for Left pleural effusion s/p insertion Left pigtail catheter insertion 1/19/24 with purulent drainage noted and Pleural fluid culture + MSSA.  Patient since underwent Flexible bronchoscopy, Left VATS, total decortication of left lung, with multilevel intercostal nerve block on 1/25/24 with Dr. Pelaez for left empyema.       Current Nutrition Diagnosis:   Pt remains at high nutrition risk secondary to moderate (acute) protein calorie malnutrition related to inadequate protein energy intake with decreased appetite (PTA) in setting of admission with empyema, s/p Left VATS, total decortication of left lung,  COVID+ as evidenced by 7lb (3.9%) weight loss x 2 weeks PTA, physical signs of mild muscle/fat loss, meeting < 75% est needs > 7days. Pt met at bedside and reports PO >100%, appetite much improved now that he is feeling better. Pt educated on diabetic diet and received paperwork on building a proper plate. Pt reported wife does most of the cooking and will pass along the information. Pt reported he will read over the paper work and reach out if he has any follow up questions. RD to remain available.     Recommendations:   1) Continue diet as tolerated.   2) Rx: MVI daily.   3) Continue to monitor blood sugars and correct as needed.  4) Obtain daily weights to monitor trends.  5) Glucerna shake BID (220kcal, 20gm protein per shake)     Monitoring and Evaluation:   [ x] PO intake [x ] Tolerance to diet prescription [X] Weights  [X] Follow up per protocol [X] Labs:

## 2024-01-30 NOTE — PROGRESS NOTE ADULT - PROBLEM SELECTOR PLAN 4
Lovenox and SCDs for DVT prophylaxis.  Protonix for GI prophylaxis.  Continue with bowel regimen for constipation.

## 2024-01-30 NOTE — PROGRESS NOTE ADULT - SUBJECTIVE AND OBJECTIVE BOX
POD 5 s/p FB LVATS total decortication intercostal nerve block    Subjective: c/o incisional pain denies CP, palpitations, SOB, cough, fever, chills, itchiness/rash, diaphoresis, vision changes, HA, dizziness/lightheadedness, numbness/tingling, abd pain, N/V     T(C): 37.3 (01-30-24 @ 00:08), Max: 37.4 (01-29-24 @ 16:38)  HR: 83 (01-30-24 @ 00:08) (75 - 92)  BP: 104/67 (01-30-24 @ 00:08) (104/67 - 126/74)  RR: 17 (01-30-24 @ 00:08) (17 - 18)  SpO2: 95% (01-30-24 @ 00:08) (95% - 98%)    01-29    130<L>  |  94<L>  |  10.1  ----------------------------<  161<H>  3.9   |  23.0  |  0.57    Ca    8.5      29 Jan 2024 06:10  Mg     1.6     01-29                                 10.7   6.91  )-----------( 387      ( 29 Jan 2024 06:10 )             31.6                    CAPILLARY BLOOD GLUCOSE  POCT Blood Glucose.: 205 mg/dL (29 Jan 2024 21:42)  POCT Blood Glucose.: 201 mg/dL (29 Jan 2024 17:15)  POCT Blood Glucose.: 153 mg/dL (29 Jan 2024 12:10)  POCT Blood Glucose.: 158 mg/dL (29 Jan 2024 08:25)    I&O's Detail    28 Jan 2024 07:01  -  29 Jan 2024 07:00  --------------------------------------------------------  IN:  Total IN: 0 mL    OUT:    Chest Tube (mL): 8 mL    Chest Tube (mL): 50 mL  Total OUT: 58 mL  Total NET: -58 mL    29 Jan 2024 07:01  -  30 Jan 2024 01:59  --------------------------------------------------------  IN:    Oral Fluid: 780 mL  Total IN: 780 mL    OUT:    Chest Tube (mL): 15 mL  Total OUT: 15 mL  Total NET: 765 mL    Drug Dosing Weight  Height (cm): 180.3 (18 Jan 2024 16:22)  Weight (kg): 80.6 (18 Jan 2024 16:22)  BMI (kg/m2): 24.8 (18 Jan 2024 16:22)  BSA (m2): 2.01 (18 Jan 2024 16:22)    MEDICATIONS  (STANDING):  dextrose 5%. 1000 milliLiter(s) (50 mL/Hr) IV Continuous <Continuous>  dextrose 5%. 1000 milliLiter(s) (100 mL/Hr) IV Continuous <Continuous>  dextrose 50% Injectable 25 Gram(s) IV Push once  dextrose 50% Injectable 12.5 Gram(s) IV Push once  enoxaparin Injectable 40 milliGRAM(s) SubCutaneous every 24 hours  glucagon  Injectable 1 milliGRAM(s) IntraMuscular once  insulin glargine Injectable (LANTUS) 30 Unit(s) SubCutaneous at bedtime  insulin lispro (ADMELOG) corrective regimen sliding scale   SubCutaneous Before meals and at bedtime  insulin lispro Injectable (ADMELOG) 12 Unit(s) SubCutaneous before dinner  insulin lispro Injectable (ADMELOG) 10 Unit(s) SubCutaneous before breakfast  insulin lispro Injectable (ADMELOG) 10 Unit(s) SubCutaneous before lunch  nafcillin  IVPB 2 Gram(s) IV Intermittent every 4 hours  nicotine -  14 mG/24Hr(s) Patch 1 Patch Transdermal daily  pantoprazole    Tablet 40 milliGRAM(s) Oral before breakfast    MEDICATIONS  (PRN):  acetaminophen     Tablet .. 650 milliGRAM(s) Oral every 6 hours PRN Temp greater or equal to 38C (100.4F), Mild Pain (1 - 3)  oxyCODONE    IR 5 milliGRAM(s) Oral every 6 hours PRN Moderate Pain (4 - 6)  oxyCODONE    IR 10 milliGRAM(s) Oral every 6 hours PRN Severe Pain (7 - 10)  polyethylene glycol 3350 17 Gram(s) Oral daily PRN Constipation  senna 2 Tablet(s) Oral at bedtime PRN Constipation    Physical Exam  Neuro: A+O x 3, non-focal, speech clear and intact  Pulm: decreased BS on left, clear on right no wheezing  CV: S1S2 RRR  Abd: +BS soft NT ND  Extrem/MS: no edema/cyanosis  inc: L VATS dsg intact  LCT x1 to waterseal no air leak

## 2024-01-30 NOTE — DISCHARGE NOTE NURSING/CASE MANAGEMENT/SOCIAL WORK - NSDCPEFALRISK_GEN_ALL_CORE
For information on Fall & Injury Prevention, visit: [email or web address] OR  [email or web address] OR  https://www.cdc.gov/steadi/patient.html

## 2024-01-30 NOTE — PROGRESS NOTE ADULT - PROBLEM SELECTOR PLAN 3
HA1c 14.4 on Metformin at home.  Continue fingersticks AC/HS with Lantus, premeal insulin, and sliding scale insulin for BG coverage.   Endocrine following.   Diabetic / consistent carb diet.  Diabetes education.  Will need Insulin on discharge.   insulin/glucometer teaching in progress

## 2024-01-30 NOTE — DISCHARGE NOTE NURSING/CASE MANAGEMENT/SOCIAL WORK - PATIENT PORTAL LINK FT
You can access the FollowMyHealth Patient Portal offered by [facility] by registering at the following website: [email or web address]. By joining [facility]’s FollowMyHealth portal, you will also be able to view your health information using other applications (apps) compatible with our system.

## 2024-01-30 NOTE — PROGRESS NOTE ADULT - SUBJECTIVE AND OBJECTIVE BOX
[facility]  INFECTIOUS DISEASES at [place] / [place] / [place]  =======================================================                               Raymond Dickinson MD#   Nat Ashraf MD*                             Holli Barba MD*   Wendi Rob MD*                              Professor Emeritus:  Dr Ranjith Alicia MD^            Diplomates American Board of Internal Medicine & Infectious Diseases                # [place] Office - Appt - Tel  [phone] Fax [phone]                * [place] Office - Appt - Tel [phone] Fax [phone]                      ^[place] Office - Tel  [phone] Fax [phone]                                  Hospital Consult line:  [phone]  =======================================================    NINFA BLANCHARD [id number]    Follow up: Staphylococcus aureus empyema     no complaints       Allergies:  No Known Allergies       REVIEW OF SYSTEMS:  CONSTITUTIONAL:  No Fever or chills  HEENT:  No diplopia or blurred vision.  No earache, sore throat or runny nose.  CARDIOVASCULAR:  No chest pain  RESPIRATORY:  No cough,. + shortness of breath  GASTROINTESTINAL:  No nausea, vomiting or diarrhea.  GENITOURINARY:  No dysuria, frequency or urgency. No Blood in urine  MUSCULOSKELETAL:  no joint aches, no muscle pain  SKIN:  No change in skin, hair or nails.  NEUROLOGIC:  No Headaches    Physical Exam:  GEN: NAD  HEENT: normocephalic and atraumatic. EOMI. PERRL.    NECK: Supple.   LUNGS: Decreased basal BS B/L   HEART: RRR  ABDOMEN: Soft, NT, ND.  +BS.    : No CVA tenderness  EXTREMITIES: Without  edema.  MSK: No joint swelling  NEUROLOGIC: No Focal Deficits   PSYCHIATRIC: Appropriate affect .  SKIN: No rash      Vitals:  T(F): 98.1 (30 Jan 2024 08:06), Max: 99.3 (29 Jan 2024 16:38)  HR: 77 (30 Jan 2024 08:06)  BP: 115/76 (30 Jan 2024 08:06)  RR: 16 (30 Jan 2024 08:06)  SpO2: 97% (30 Jan 2024 08:06) (94% - 97%)  temp max in last 48H T(F): , Max: 101 (01-28-24 @ 15:55)    Current Antibiotics:  nafcillin  IVPB 2 Gram(s) IV Intermittent every 4 hours    Other medications:  dextrose 5%. 1000 milliLiter(s) IV Continuous <Continuous>  dextrose 5%. 1000 milliLiter(s) IV Continuous <Continuous>  dextrose 50% Injectable 25 Gram(s) IV Push once  dextrose 50% Injectable 12.5 Gram(s) IV Push once  enoxaparin Injectable 40 milliGRAM(s) SubCutaneous every 24 hours  glucagon  Injectable 1 milliGRAM(s) IntraMuscular once  insulin glargine Injectable (LANTUS) 30 Unit(s) SubCutaneous at bedtime  insulin lispro (ADMELOG) corrective regimen sliding scale   SubCutaneous Before meals and at bedtime  insulin lispro Injectable (ADMELOG) 10 Unit(s) SubCutaneous before breakfast  insulin lispro Injectable (ADMELOG) 10 Unit(s) SubCutaneous before lunch  insulin lispro Injectable (ADMELOG) 12 Unit(s) SubCutaneous before dinner  magnesium sulfate  IVPB 2 Gram(s) IV Intermittent once  nicotine -  14 mG/24Hr(s) Patch 1 Patch Transdermal daily  pantoprazole    Tablet 40 milliGRAM(s) Oral before breakfast                            11.8   7.27  )-----------( 472      ( 30 Jan 2024 07:50 )             35.1     01-30    131<L>  |  93<L>  |  10.6  ----------------------------<  166<H>  4.2   |  26.0  |  0.64    Ca    9.0      30 Jan 2024 07:50  Mg     1.6     01-30      RECENT CULTURES:  01-25 @ 13:01 .Tissue products of decortication Staphylococcus aureus    Numerous Staphylococcus aureus  Moderate polymorphonuclear leukocytes seen per low power field  Numerous Gram positive cocci in pairs seen per oil power field    01-19 @ 16:23 Pleural Fl Pleural Fluid Staphylococcus aureus    Numerous Staphylococcus aureus  polymorphonuclear leukocytes seen  Gram positive cocci in pairs seen  by cytocentrifuge    01-19 @ 07:50 Clean Catch Clean Catch (Midstream)     <10,000 CFU/mL Normal Urogenital Louise    01-18 @ 18:15 .Blood Blood     No growth at 5 days    01-18 @ 18:00 .Blood Blood     No growth at 5 days    01-18 @ 17:50    RVP  Detected  SARS-CoV-2: Detected (01-18-24 @ 17:50)    WBC Count: 7.27 K/uL (01-30-24 @ 07:50)  WBC Count: 6.91 K/uL (01-29-24 @ 06:10)  WBC Count: 7.90 K/uL (01-28-24 @ 06:10)  WBC Count: 11.64 K/uL (01-27-24 @ 06:30)  WBC Count: 8.40 K/uL (01-25-24 @ 14:00)    Creatinine: 0.64 mg/dL (01-30-24 @ 07:50)  Creatinine: 0.57 mg/dL (01-29-24 @ 06:10)  Creatinine: 0.62 mg/dL (01-28-24 @ 06:10)  Creatinine: 0.64 mg/dL (01-27-24 @ 06:30)  Creatinine: 0.79 mg/dL (01-25-24 @ 14:00)    Procalcitonin, Serum: 0.07 ng/mL (01-24-24 @ 06:45)         < from: Xray Chest 1 View- PORTABLE-Urgent (Xray Chest 1 View- PORTABLE-Urgent .) (01.26.24 @ 05:12) >  ACC: [id number] EXAM:  XR CHEST PORTABLE URGENT 1V   ORDERED BY: VALENTIN CHIN     ACC: [id number] EXAM:  XR CHEST AP OR PA 1V   ORDERED BY: GERMAN CR     PROCEDURE DATE:  01/25/2024      INTERPRETATION:  AP chest on January 25,2024 at 2:19 PM. Patient had   left chest VATS surgery.    Heart normal for projection.    On January 24 there is a catheter left chest tube. This is been removed   and presently there are 2 left chest tubes impression. There are some   procedural changes concentrated at left base. No pneumothorax.    Follow-up AP chest on January 26, 2024 4:29 AM.    Slight increase in left base effusion.    IMPRESSION: Left chest surgery with 2 left chest tubes inserted. There is   a mild increase in left base effusion on the latest film.    --- End of Report ---    < end of copied text >

## 2024-01-31 VITALS
TEMPERATURE: 98 F | SYSTOLIC BLOOD PRESSURE: 112 MMHG | HEART RATE: 84 BPM | RESPIRATION RATE: 18 BRPM | DIASTOLIC BLOOD PRESSURE: 68 MMHG | OXYGEN SATURATION: 96 %

## 2024-01-31 LAB
ANION GAP SERPL CALC-SCNC: 12 MMOL/L — SIGNIFICANT CHANGE UP (ref 5–17)
BUN SERPL-MCNC: 11.3 MG/DL — SIGNIFICANT CHANGE UP (ref 8–20)
CALCIUM SERPL-MCNC: 8.7 MG/DL — SIGNIFICANT CHANGE UP (ref 8.4–10.5)
CHLORIDE SERPL-SCNC: 96 MMOL/L — SIGNIFICANT CHANGE UP (ref 96–108)
CO2 SERPL-SCNC: 24 MMOL/L — SIGNIFICANT CHANGE UP (ref 22–29)
CREAT SERPL-MCNC: 0.72 MG/DL — SIGNIFICANT CHANGE UP (ref 0.5–1.3)
CULTURE RESULTS: ABNORMAL
EGFR: 105 ML/MIN/1.73M2 — SIGNIFICANT CHANGE UP
GLUCOSE BLDC GLUCOMTR-MCNC: 163 MG/DL — HIGH (ref 70–99)
GLUCOSE BLDC GLUCOMTR-MCNC: 165 MG/DL — HIGH (ref 70–99)
GLUCOSE SERPL-MCNC: 175 MG/DL — HIGH (ref 70–99)
HCT VFR BLD CALC: 33.8 % — LOW (ref 39–50)
HGB BLD-MCNC: 11.6 G/DL — LOW (ref 13–17)
MAGNESIUM SERPL-MCNC: 1.8 MG/DL — SIGNIFICANT CHANGE UP (ref 1.6–2.6)
MCHC RBC-ENTMCNC: 31.4 PG — SIGNIFICANT CHANGE UP (ref 27–34)
MCHC RBC-ENTMCNC: 34.3 GM/DL — SIGNIFICANT CHANGE UP (ref 32–36)
MCV RBC AUTO: 91.6 FL — SIGNIFICANT CHANGE UP (ref 80–100)
ORGANISM # SPEC MICROSCOPIC CNT: ABNORMAL
ORGANISM # SPEC MICROSCOPIC CNT: SIGNIFICANT CHANGE UP
PLATELET # BLD AUTO: 398 K/UL — SIGNIFICANT CHANGE UP (ref 150–400)
POTASSIUM SERPL-MCNC: 4.1 MMOL/L — SIGNIFICANT CHANGE UP (ref 3.5–5.3)
POTASSIUM SERPL-SCNC: 4.1 MMOL/L — SIGNIFICANT CHANGE UP (ref 3.5–5.3)
RBC # BLD: 3.69 M/UL — LOW (ref 4.2–5.8)
RBC # FLD: 11.8 % — SIGNIFICANT CHANGE UP (ref 10.3–14.5)
SODIUM SERPL-SCNC: 132 MMOL/L — LOW (ref 135–145)
SPECIMEN SOURCE: SIGNIFICANT CHANGE UP
SURGICAL PATHOLOGY STUDY: SIGNIFICANT CHANGE UP
WBC # BLD: 7.44 K/UL — SIGNIFICANT CHANGE UP (ref 3.8–10.5)
WBC # FLD AUTO: 7.44 K/UL — SIGNIFICANT CHANGE UP (ref 3.8–10.5)

## 2024-01-31 PROCEDURE — 82962 GLUCOSE BLOOD TEST: CPT

## 2024-01-31 PROCEDURE — 83986 ASSAY PH BODY FLUID NOS: CPT

## 2024-01-31 PROCEDURE — 84681 ASSAY OF C-PEPTIDE: CPT

## 2024-01-31 PROCEDURE — 81001 URINALYSIS AUTO W/SCOPE: CPT

## 2024-01-31 PROCEDURE — 80053 COMPREHEN METABOLIC PANEL: CPT

## 2024-01-31 PROCEDURE — G1004: CPT

## 2024-01-31 PROCEDURE — 84145 PROCALCITONIN (PCT): CPT

## 2024-01-31 PROCEDURE — 86850 RBC ANTIBODY SCREEN: CPT

## 2024-01-31 PROCEDURE — C1889: CPT

## 2024-01-31 PROCEDURE — C1729: CPT

## 2024-01-31 PROCEDURE — 84132 ASSAY OF SERUM POTASSIUM: CPT

## 2024-01-31 PROCEDURE — 87206 SMEAR FLUORESCENT/ACID STAI: CPT

## 2024-01-31 PROCEDURE — 84484 ASSAY OF TROPONIN QUANT: CPT

## 2024-01-31 PROCEDURE — 83615 LACTATE (LD) (LDH) ENZYME: CPT

## 2024-01-31 PROCEDURE — 86341 ISLET CELL ANTIBODY: CPT

## 2024-01-31 PROCEDURE — 83935 ASSAY OF URINE OSMOLALITY: CPT

## 2024-01-31 PROCEDURE — 84157 ASSAY OF PROTEIN OTHER: CPT

## 2024-01-31 PROCEDURE — 87070 CULTURE OTHR SPECIMN AEROBIC: CPT

## 2024-01-31 PROCEDURE — 83735 ASSAY OF MAGNESIUM: CPT

## 2024-01-31 PROCEDURE — 85014 HEMATOCRIT: CPT

## 2024-01-31 PROCEDURE — 36415 COLL VENOUS BLD VENIPUNCTURE: CPT

## 2024-01-31 PROCEDURE — 86900 BLOOD TYPING SEROLOGIC ABO: CPT

## 2024-01-31 PROCEDURE — 71045 X-RAY EXAM CHEST 1 VIEW: CPT

## 2024-01-31 PROCEDURE — 82330 ASSAY OF CALCIUM: CPT

## 2024-01-31 PROCEDURE — 96374 THER/PROPH/DIAG INJ IV PUSH: CPT

## 2024-01-31 PROCEDURE — 89051 BODY FLUID CELL COUNT: CPT

## 2024-01-31 PROCEDURE — 87075 CULTR BACTERIA EXCEPT BLOOD: CPT

## 2024-01-31 PROCEDURE — 83880 ASSAY OF NATRIURETIC PEPTIDE: CPT

## 2024-01-31 PROCEDURE — 82042 OTHER SOURCE ALBUMIN QUAN EA: CPT

## 2024-01-31 PROCEDURE — 83930 ASSAY OF BLOOD OSMOLALITY: CPT

## 2024-01-31 PROCEDURE — 82945 GLUCOSE OTHER FLUID: CPT

## 2024-01-31 PROCEDURE — 87102 FUNGUS ISOLATION CULTURE: CPT

## 2024-01-31 PROCEDURE — 85025 COMPLETE CBC W/AUTO DIFF WBC: CPT

## 2024-01-31 PROCEDURE — C9399: CPT

## 2024-01-31 PROCEDURE — 88305 TISSUE EXAM BY PATHOLOGIST: CPT

## 2024-01-31 PROCEDURE — 87040 BLOOD CULTURE FOR BACTERIA: CPT

## 2024-01-31 PROCEDURE — 97163 PT EVAL HIGH COMPLEX 45 MIN: CPT

## 2024-01-31 PROCEDURE — 99232 SBSQ HOSP IP/OBS MODERATE 35: CPT

## 2024-01-31 PROCEDURE — 87015 SPECIMEN INFECT AGNT CONCNTJ: CPT

## 2024-01-31 PROCEDURE — 87086 URINE CULTURE/COLONY COUNT: CPT

## 2024-01-31 PROCEDURE — 84155 ASSAY OF PROTEIN SERUM: CPT

## 2024-01-31 PROCEDURE — 82947 ASSAY GLUCOSE BLOOD QUANT: CPT

## 2024-01-31 PROCEDURE — 71045 X-RAY EXAM CHEST 1 VIEW: CPT | Mod: 26

## 2024-01-31 PROCEDURE — 87116 MYCOBACTERIA CULTURE: CPT

## 2024-01-31 PROCEDURE — 71275 CT ANGIOGRAPHY CHEST: CPT | Mod: ME

## 2024-01-31 PROCEDURE — 93005 ELECTROCARDIOGRAM TRACING: CPT

## 2024-01-31 PROCEDURE — 85610 PROTHROMBIN TIME: CPT

## 2024-01-31 PROCEDURE — 83036 HEMOGLOBIN GLYCOSYLATED A1C: CPT

## 2024-01-31 PROCEDURE — 88112 CYTOPATH CELL ENHANCE TECH: CPT

## 2024-01-31 PROCEDURE — 83690 ASSAY OF LIPASE: CPT

## 2024-01-31 PROCEDURE — 84295 ASSAY OF SERUM SODIUM: CPT

## 2024-01-31 PROCEDURE — 85730 THROMBOPLASTIN TIME PARTIAL: CPT

## 2024-01-31 PROCEDURE — 71250 CT THORAX DX C-: CPT

## 2024-01-31 PROCEDURE — 87641 MR-STAPH DNA AMP PROBE: CPT

## 2024-01-31 PROCEDURE — 87205 SMEAR GRAM STAIN: CPT

## 2024-01-31 PROCEDURE — 87640 STAPH A DNA AMP PROBE: CPT

## 2024-01-31 PROCEDURE — 85027 COMPLETE CBC AUTOMATED: CPT

## 2024-01-31 PROCEDURE — 80048 BASIC METABOLIC PNL TOTAL CA: CPT

## 2024-01-31 PROCEDURE — 83605 ASSAY OF LACTIC ACID: CPT

## 2024-01-31 PROCEDURE — 71046 X-RAY EXAM CHEST 2 VIEWS: CPT

## 2024-01-31 PROCEDURE — 99285 EMERGENCY DEPT VISIT HI MDM: CPT | Mod: 25

## 2024-01-31 PROCEDURE — 87186 SC STD MICRODIL/AGAR DIL: CPT

## 2024-01-31 PROCEDURE — 84300 ASSAY OF URINE SODIUM: CPT

## 2024-01-31 PROCEDURE — 85018 HEMOGLOBIN: CPT

## 2024-01-31 PROCEDURE — 82435 ASSAY OF BLOOD CHLORIDE: CPT

## 2024-01-31 PROCEDURE — 82803 BLOOD GASES ANY COMBINATION: CPT

## 2024-01-31 PROCEDURE — 87077 CULTURE AEROBIC IDENTIFY: CPT

## 2024-01-31 PROCEDURE — 86901 BLOOD TYPING SEROLOGIC RH(D): CPT

## 2024-01-31 PROCEDURE — 99024 POSTOP FOLLOW-UP VISIT: CPT

## 2024-01-31 PROCEDURE — 86803 HEPATITIS C AB TEST: CPT

## 2024-01-31 PROCEDURE — 0225U NFCT DS DNA&RNA 21 SARSCOV2: CPT

## 2024-01-31 RX ORDER — ISOPROPYL ALCOHOL, BENZOCAINE .7; .06 ML/ML; ML/ML
0 SWAB TOPICAL
Qty: 100 | Refills: 1
Start: 2024-01-31

## 2024-01-31 RX ORDER — INSULIN GLARGINE 100 [IU]/ML
30 INJECTION, SOLUTION SUBCUTANEOUS
Qty: 1 | Refills: 2
Start: 2024-01-31 | End: 2024-04-29

## 2024-01-31 RX ORDER — MAGNESIUM SULFATE 500 MG/ML
2 VIAL (ML) INJECTION ONCE
Refills: 0 | Status: COMPLETED | OUTPATIENT
Start: 2024-01-31 | End: 2024-01-31

## 2024-01-31 RX ORDER — SENNA PLUS 8.6 MG/1
2 TABLET ORAL
Qty: 14 | Refills: 0
Start: 2024-01-31 | End: 2024-02-06

## 2024-01-31 RX ORDER — INSULIN LISPRO 100/ML
12 VIAL (ML) SUBCUTANEOUS
Qty: 1 | Refills: 2
Start: 2024-01-31 | End: 2024-04-29

## 2024-01-31 RX ORDER — METFORMIN HYDROCHLORIDE 850 MG/1
1 TABLET ORAL
Refills: 0 | DISCHARGE

## 2024-01-31 RX ORDER — OXYCODONE AND ACETAMINOPHEN 5; 325 MG/1; MG/1
1 TABLET ORAL
Qty: 20 | Refills: 0
Start: 2024-01-31 | End: 2024-02-04

## 2024-01-31 RX ADMIN — PANTOPRAZOLE SODIUM 40 MILLIGRAM(S): 20 TABLET, DELAYED RELEASE ORAL at 06:07

## 2024-01-31 RX ADMIN — Medication 25 GRAM(S): at 08:29

## 2024-01-31 RX ADMIN — NAFCILLIN 200 GRAM(S): 10 INJECTION, POWDER, FOR SOLUTION INTRAVENOUS at 10:24

## 2024-01-31 RX ADMIN — ENOXAPARIN SODIUM 40 MILLIGRAM(S): 100 INJECTION SUBCUTANEOUS at 06:07

## 2024-01-31 RX ADMIN — NAFCILLIN 200 GRAM(S): 10 INJECTION, POWDER, FOR SOLUTION INTRAVENOUS at 02:22

## 2024-01-31 RX ADMIN — Medication 12 UNIT(S): at 08:28

## 2024-01-31 RX ADMIN — Medication 1 PATCH: at 00:34

## 2024-01-31 RX ADMIN — Medication 1: at 08:28

## 2024-01-31 RX ADMIN — Medication 1: at 12:45

## 2024-01-31 RX ADMIN — NAFCILLIN 200 GRAM(S): 10 INJECTION, POWDER, FOR SOLUTION INTRAVENOUS at 06:06

## 2024-01-31 RX ADMIN — Medication 12 UNIT(S): at 12:45

## 2024-01-31 NOTE — DISCHARGE NOTE PROVIDER - NSDCCPTREATMENT_GEN_ALL_CORE_FT
PRINCIPAL PROCEDURE  Procedure: Decortication, lung, total, thoracoscopic  Findings and Treatment: Flex bronchoscopy, vats, total decortication left lung, multilevel intercostal nerve block

## 2024-01-31 NOTE — PROGRESS NOTE ADULT - SUBJECTIVE AND OBJECTIVE BOX
INTERVAL EVENTS:  Follow up diabetes management    ROS: Denies chest pain, sob, abd pain    MEDICATIONS  (STANDING):  dextrose 5%. 1000 milliLiter(s) (50 mL/Hr) IV Continuous <Continuous>  dextrose 5%. 1000 milliLiter(s) (100 mL/Hr) IV Continuous <Continuous>  dextrose 50% Injectable 25 Gram(s) IV Push once  dextrose 50% Injectable 12.5 Gram(s) IV Push once  enoxaparin Injectable 40 milliGRAM(s) SubCutaneous every 24 hours  glucagon  Injectable 1 milliGRAM(s) IntraMuscular once  insulin glargine Injectable (LANTUS) 30 Unit(s) SubCutaneous at bedtime  insulin lispro (ADMELOG) corrective regimen sliding scale   SubCutaneous Before meals and at bedtime  insulin lispro Injectable (ADMELOG) 12 Unit(s) SubCutaneous before lunch  insulin lispro Injectable (ADMELOG) 12 Unit(s) SubCutaneous before breakfast  insulin lispro Injectable (ADMELOG) 14 Unit(s) SubCutaneous before dinner  nafcillin  IVPB 2 Gram(s) IV Intermittent every 4 hours  nicotine -  14 mG/24Hr(s) Patch 1 Patch Transdermal daily  pantoprazole    Tablet 40 milliGRAM(s) Oral before breakfast    MEDICATIONS  (PRN):  acetaminophen     Tablet .. 650 milliGRAM(s) Oral every 6 hours PRN Temp greater or equal to 38C (100.4F), Mild Pain (1 - 3)  oxyCODONE    IR 5 milliGRAM(s) Oral every 6 hours PRN Moderate Pain (4 - 6)  oxyCODONE    IR 10 milliGRAM(s) Oral every 6 hours PRN Severe Pain (7 - 10)  polyethylene glycol 3350 17 Gram(s) Oral daily PRN Constipation  senna 2 Tablet(s) Oral at bedtime PRN Constipation    Allergies  No Known Allergies    Vital Signs Last 24 Hrs  T(C): 36.6 (31 Jan 2024 07:46), Max: 37 (30 Jan 2024 23:41)  T(F): 97.9 (31 Jan 2024 07:46), Max: 98.6 (30 Jan 2024 23:41)  HR: 79 (31 Jan 2024 07:46) (79 - 91)  BP: 131/77 (31 Jan 2024 07:46) (108/70 - 131/77)  BP(mean): --  RR: 18 (31 Jan 2024 07:46) (18 - 18)  SpO2: 95% (31 Jan 2024 07:46) (92% - 95%)    Parameters below as of 31 Jan 2024 07:46  Patient On (Oxygen Delivery Method): room air      PHYSICAL EXAM:  General: No apparent distress  Neck: Supple, trachea midline, no thyromegaly  Respiratory: Lungs clear bilaterally, normal rate, effort  Cardiac: +S1, S2, no m/r/g  GI: +BS, soft, non tender, non distended  Extremities: No peripheral edema, no pedal lesions  Neuro: A+O X3, no tremor      LABS:                        11.6   7.44  )-----------( 398      ( 31 Jan 2024 05:55 )             33.8     01-31    132<L>  |  96  |  11.3  ----------------------------<  175<H>  4.1   |  24.0  |  0.72    Ca    8.7      31 Jan 2024 05:55  Mg     1.8     01-31      Urinalysis Basic - ( 31 Jan 2024 05:55 )    Color: x / Appearance: x / SG: x / pH: x  Gluc: 175 mg/dL / Ketone: x  / Bili: x / Urobili: x   Blood: x / Protein: x / Nitrite: x   Leuk Esterase: x / RBC: x / WBC x   Sq Epi: x / Non Sq Epi: x / Bacteria: x    POCT Blood Glucose.: 165 mg/dL (01-31-24 @ 07:53)  POCT Blood Glucose.: 150 mg/dL (01-30-24 @ 21:12)  POCT Blood Glucose.: 160 mg/dL (01-30-24 @ 17:25)  POCT Blood Glucose.: 129 mg/dL (01-30-24 @ 11:54)

## 2024-01-31 NOTE — PROGRESS NOTE ADULT - NS ATTEND AMEND GEN_ALL_CORE FT
Lantus was increased to 30 last night, lower premeal to 10 TID. Continue sliding scale. Pending PHILL. Discharge on basal bolus regimen. Follow up scheduled
Sugar stable on the above regimen, can discharge on the above basal bolus regimen. Has a follow up appointment. No further recs.  Endocrine will sign off. Please recall if needed
The case was discussed with NP in details. Agree with above assessment and recommendations.
Still with hyperglycemia throughout. Increase basal bolus regimen as above
The case was reviewed and discussed with NP in details. Agree with above assessment and recommendations.
FSG reviewed, high at bedtime. Increase premeal before dinner, the rest of the insulin dosages as above
Agree with increasing the lantus as mentioned above. Basal bolus for now

## 2024-01-31 NOTE — DISCHARGE NOTE PROVIDER - NSDCMRMEDTOKEN_GEN_ALL_CORE_FT
Doctor Note: Mr Danielito Espino admitted to [facility] on 1/18 , 2024 currently needs to remain in the hospital in patient  lisinopril 10 mg oral tablet: 1 tab(s) orally once a day  metFORMIN 750 mg oral tablet, extended release: 1 tab(s) orally once a day   senna leaf extract oral tablet: 2 tab(s) orally once a day (at bedtime) as needed for Constipation Please take while taking narcotics

## 2024-01-31 NOTE — DISCHARGE NOTE PROVIDER - NSDCFUADDINST_GEN_ALL_CORE_FT
Please call the Cardiothoracic Surgery office at [phone] if you are experiencing any shortness of breath, chest pain, fevers or chills, drainage from the incisions, persistent nausea, vomiting or if you have any questions about your medications. If the symptoms are severe, call 911 and go to the nearest hospital.

## 2024-01-31 NOTE — PROGRESS NOTE ADULT - PROVIDER SPECIALTY LIST ADULT
Endocrinology
Hospitalist
Infectious Disease
Thoracic Surgery
Endocrinology
Hospitalist
Internal Medicine
Pulmonology
Thoracic Surgery
Endocrinology
Hospitalist
Hospitalist
Infectious Disease
Internal Medicine
Thoracic Surgery
Hospitalist
Infectious Disease
Pulmonology
Thoracic Surgery
Thoracic Surgery
Infectious Disease
Pulmonology
Thoracic Surgery

## 2024-01-31 NOTE — PROGRESS NOTE ADULT - TIME BILLING
This includes chart review, patient assessment, discussion with Dr Traore. Antibiotic dosing and management with clinical pharmacy.
reviewing labs, radiology, other provider's notes, d/w pt and family at bedside
This includes chart review, patient assessment, discussion with CT surgery. Antibiotic dosing and management with clinical pharmacy.
reviewing labs, radiology, other provider's notes
reviewing labs, radiology, other provider's notes; d/w Dr Mena and KATHRIN Albrecht NP

## 2024-01-31 NOTE — DISCHARGE NOTE PROVIDER - NSDCCPCAREPLAN_GEN_ALL_CORE_FT
PRINCIPAL DISCHARGE DIAGNOSIS  Diagnosis: Loculated pleural effusion  Assessment and Plan of Treatment: Please call Dr. Pelaez's office at [phone] tomorrow or the next business day to make a followup appointment.   Leave dressing intact until tomorrow evening, reinforcing with tape if necessary (36-48 hours from chest tube removal). At that time you may remove the dressing and take a shower. Place clean gauze over wound if continual drainage. No ointments or lotions on the incision.   Take all medications as ordered. Continue a stool softener as needed. Continue to use your incentive spirometer and increase your activity as tolerated. No baths or swimming. You may not return to work or drive until cleared by surgeon. Call the office if you experience any fevers, shortness of breath, chest pain or excessive drainage from the incision, day or night. Go to the emergency room if any of these symptoms are severe.     PRINCIPAL DISCHARGE DIAGNOSIS  Diagnosis: Loculated pleural effusion  Assessment and Plan of Treatment: You may take a shower tonight 1/31/24. Place clean gauze over wound if continual drainage. No ointments or lotions on the incision. You are being discharged with stitches in place that will be removed at your outpatient visit.   Take all medications as ordered. Continue a stool softener as needed. Continue to use your incentive spirometer and increase your activity as tolerated. No baths or swimming. You may return to work on 2/1/24 but no heaving lifiting for 4 weeks after surgery. Call the office if you experience any fevers, shortness of breath, chest pain or excessive drainage from the incision, day or night. Go to the emergency room if any of these symptoms are severe.      SECONDARY DISCHARGE DIAGNOSES  Diagnosis: Diabetes mellitus  Assessment and Plan of Treatment: It is extremely important to follow a diabetic (consistent carbohydrates, LOW/NO ADDED SUGAR) diet and monitor your glucose (sugar) levels. You have an increased risk of complications, including wound infections, due to the diabetes.  1. Check your glucoses 3-4 times daily before meals and bedtime.   2. Keep a log of your glucose levels every day.   3. Make an appointment to meet with your primary care provider or endocrinologist within a week.   4. Take ALL of your medications as prescribed. Only hold medications for low glucose levels (< 80) or if you are symptomatic (dizzy, sweating, lightheaded).  5. Ideally, we would like all of the glucose levels to be between .   Please continue to take the medications as prescribed and notify the endocrine office, nurse practitioner or your PCP if you have any concerns right away.

## 2024-01-31 NOTE — DISCHARGE NOTE PROVIDER - CARE PROVIDERS DIRECT ADDRESSES
,[email or web address]

## 2024-01-31 NOTE — DISCHARGE NOTE PROVIDER - PROVIDER TOKENS
PROVIDER:[TOKEN:[[id number]]] PROVIDER:[TOKEN:[[id number]],SCHEDULEDAPPT:[02/05/2024],SCHEDULEDAPPTTIME:[10:15 AM],ESTABLISHEDPATIENT:[T]],PROVIDER:[TOKEN:[[id number]],FOLLOWUP:[1 month],ESTABLISHEDPATIENT:[T]]

## 2024-01-31 NOTE — PROGRESS NOTE ADULT - ASSESSMENT
58M PMHx DM, HTN, active smoker, who was sent to the ED for abnormal outpatient CT chest. Patient reported left sided CP associated with dry cough and shortness of breath. CT chest showed L. sided pleural effusion and right sided lung nodules. Patient reported decrease appetite and weight loss (unintentional).    Consulted for diabetes management  Home regimen: Metformin (stopped it, resumed 2 weeks ago)  Current a1c: 14%    1. Uncontrolled DM2 with hyperglycemia  - Premeal admelog 12 units with breakfast and lunch, 14 units with dinner  - Lantus 30 units qhs  - Mild sliding scale coverage  - Recommend to discharge on current doses  - To be discharged on basal bolus insulin, please continue to educate on insulin pen administration, hypoglycemia and treatment, diet, exercise, and blood glucose monitoring  - C-peptide 1.2 with glucose 199/PHILL and islet cell ab negative  - F/u appt: 2/19 @ 1pm with Iris ([facility])    2. Pleural effusion  - S/p Flexible bronchoscopy, VATS, total decortication of left lung 1/25/24    3.  Covid with superimposed bacterial PNA  - Completed course of Remdesivir

## 2024-01-31 NOTE — PROGRESS NOTE ADULT - ASSESSMENT
58y  Male with h/o HTN, HLD, type 2 DM (on oral agents), active tobacco smoker (3/4PPD), was sent to the ED for abnormal outpatient CT chest. Patient reported pain on his left side x 4-5 days associated with dry cough and shortness of breath. Here he has been afebrile with leukocytosis to 12k. CT chest which showed pleural effusion and right sided lung nodules. Patient reported decrease appetite and weight loss (unintentional). He has no fever, night sweat, nausea, vomiting, abdominal pain, fall, trauma to the side, change in bowel/urinary habit, recent travel, sick contact. He was round to be COVID 19 positive. Started on Vancomycin, Zosyn. Pleural fluid with MSSA.      Antibiotics Course:  piperacillin/tazobactam 1/19 - 1/24  remdesivir 1/19 - 1/23  vancomycin  1/20 - 1/22  Nafcillin 1/24 - Present       Staphylococcus aureus empyema   Leukocytosis   Acute COVID 19  Active smoker   s/p Flexible bronchoscopy, VATS, total decortication of left lung 1/25/24      - OR cultures reporting Staphylococcus aureus  (MSSA)  - Blood cultures 1/18 no growth   - Pleural Fluid culture 1/19 reporting Staphylococcus aureus  (MSSA)  - RVP/COVID 19 PCR 1/18 + SARS-CoV-2  - Urine Cx 1/19 not significant   - CT Chest 1/21 reporting decreased loculated left pleural effusion. cavitary nodules within right upper and right lower lobe  - CTA Chest 1/18 reporting no PE and loculated left pleural effusion  - UA 1/19 not concerning for UTI  - LDH pleural fluid 7304  - Procalcitonin level 0.07  - Completed 5 days of Remdesivir  1/23/24   - Continue  Nafcillin   - On D/C can switch to Augmentin till 2/22/24  - Follow up cultures  - Trend Fever  - Trend WBC      Will Follow    Discussed treatment plan with: CT surgery

## 2024-01-31 NOTE — DISCHARGE NOTE PROVIDER - CARE PROVIDER_API CALL
Keanu Pelaez  Thoracic Surgery  [address] [place] [address]  Phone: [phone]  Fax: [phone]  Follow Up Time:    Keanu Pelaez  Thoracic Surgery  [address] [place] [address]  Phone: [phone]  Fax: [phone]  Established Patient  Scheduled Appointment: 02/05/2024 10:15 AM    Nat Ashraf  Infectious Disease  [address] [place] [address]  Phone: [phone]  Fax: [phone]  Established Patient  Follow Up Time: 1 month

## 2024-01-31 NOTE — DISCHARGE NOTE PROVIDER - HOSPITAL COURSE
58M with PMH HTN, DM2 (a1c 14.4) active smoker initially presented with LT chest wall pain and SOB. Found to have pleural effusion and RT sided lung nodule. Chest tube was placed which revealed evidence of empyema - fluid +MSSA. Hospital course noted for COVID19 infection, now resolved. PT underwent LT VATS, total decortication on 1/25. Postop course uncomplicated. Chest tubes removed. Patient safe for discharge per Dr. Pelaez. Uncontrolled diabetic, new to insulin - will be discharged on home insulin regiment with endocrinology followup. 58M with PMH HTN, DM2 (a1c 14.4) active smoker initially presented with LT chest wall pain and SOB. Found to have pleural effusion and RT sided lung nodule. Chest tube was placed which revealed evidence of empyema - fluid +MSSA. Hospital course noted for COVID19 infection, now resolved. PT underwent LT VATS, total decortication on 1/25. Postop course uncomplicated. Chest tubes removed. Uncontrolled diabetic, new to insulin - will be discharged on home insulin regiment with endocrinology followup. Patient will also be discharged on Augmentin through 2/22/24.    Patient safe for discharge per Dr. Pelaez.

## 2024-01-31 NOTE — PROGRESS NOTE ADULT - SUBJECTIVE AND OBJECTIVE BOX
[facility]  INFECTIOUS DISEASES at [place] / [place] / [place]  =======================================================                               Raymond Dickinson MD#   Nat Ashraf MD*                             Holli Barba MD*   Wendi Rob MD*                              Professor Emeritus:  Dr Ranjith Alicia MD^            Diplomates American Board of Internal Medicine & Infectious Diseases                # [place] Office - Appt - Tel  [phone] Fax [phone]                * [place] Office - Appt - Tel [phone] Fax [phone]                      ^[place] Office - Tel  [phone] Fax [phone]                                  Hospital Consult line:  [phone]  =======================================================    NINFA BLANCHARD [id number]    Follow up: Staphylococcus aureus empyema     no complaints       Allergies:  No Known Allergies       REVIEW OF SYSTEMS:  CONSTITUTIONAL:  No Fever or chills  HEENT:  No diplopia or blurred vision.  No earache, sore throat or runny nose.  CARDIOVASCULAR:  No chest pain  RESPIRATORY:  No cough,. + shortness of breath  GASTROINTESTINAL:  No nausea, vomiting or diarrhea.  GENITOURINARY:  No dysuria, frequency or urgency. No Blood in urine  MUSCULOSKELETAL:  no joint aches, no muscle pain  SKIN:  No change in skin, hair or nails.  NEUROLOGIC:  No Headaches    Physical Exam:  GEN: NAD  HEENT: normocephalic and atraumatic. EOMI. PERRL.    NECK: Supple.   LUNGS: Decreased basal BS B/L   HEART: RRR  ABDOMEN: Soft, NT, ND.  +BS.    : No CVA tenderness  EXTREMITIES: Without  edema.  MSK: No joint swelling  NEUROLOGIC: No Focal Deficits   PSYCHIATRIC: Appropriate affect .  SKIN: No rash      Vitals:  T(F): 97.9 (31 Jan 2024 07:46), Max: 98.6 (30 Jan 2024 23:41)  HR: 79 (31 Jan 2024 07:46)  BP: 131/77 (31 Jan 2024 07:46)  RR: 18 (31 Jan 2024 07:46)  SpO2: 95% (31 Jan 2024 07:46) (92% - 95%)  temp max in last 48H T(F): , Max: 99.3 (01-29-24 @ 16:38)    Current Antibiotics:  nafcillin  IVPB 2 Gram(s) IV Intermittent every 4 hours    Other medications:  dextrose 5%. 1000 milliLiter(s) IV Continuous <Continuous>  dextrose 5%. 1000 milliLiter(s) IV Continuous <Continuous>  dextrose 50% Injectable 25 Gram(s) IV Push once  dextrose 50% Injectable 12.5 Gram(s) IV Push once  enoxaparin Injectable 40 milliGRAM(s) SubCutaneous every 24 hours  glucagon  Injectable 1 milliGRAM(s) IntraMuscular once  insulin glargine Injectable (LANTUS) 30 Unit(s) SubCutaneous at bedtime  insulin lispro (ADMELOG) corrective regimen sliding scale   SubCutaneous Before meals and at bedtime  insulin lispro Injectable (ADMELOG) 12 Unit(s) SubCutaneous before breakfast  insulin lispro Injectable (ADMELOG) 14 Unit(s) SubCutaneous before dinner  insulin lispro Injectable (ADMELOG) 12 Unit(s) SubCutaneous before lunch  nicotine -  14 mG/24Hr(s) Patch 1 Patch Transdermal daily  pantoprazole    Tablet 40 milliGRAM(s) Oral before breakfast                            11.6   7.44  )-----------( 398      ( 31 Jan 2024 05:55 )             33.8     01-31    132<L>  |  96  |  11.3  ----------------------------<  175<H>  4.1   |  24.0  |  0.72    Ca    8.7      31 Jan 2024 05:55  Mg     1.8     01-31      RECENT CULTURES:  01-25 @ 13:01 .Tissue products of decortication Staphylococcus aureus    Numerous Staphylococcus aureus  Moderate polymorphonuclear leukocytes seen per low power field  Numerous Gram positive cocci in pairs seen per oil power field    01-19 @ 16:23 Pleural Fl Pleural Fluid Staphylococcus aureus    Numerous Staphylococcus aureus  polymorphonuclear leukocytes seen  Gram positive cocci in pairs seen  by cytocentrifuge    01-19 @ 07:50 Clean Catch Clean Catch (Midstream)     <10,000 CFU/mL Normal Urogenital Louise    01-18 @ 18:15 .Blood Blood     No growth at 5 days    01-18 @ 18:00 .Blood Blood     No growth at 5 days    01-18 @ 17:50      Detected  SARS-CoV-2: Detected (01-18-24 @ 17:50)    WBC Count: 7.44 K/uL (01-31-24 @ 05:55)  WBC Count: 7.27 K/uL (01-30-24 @ 07:50)  WBC Count: 6.91 K/uL (01-29-24 @ 06:10)  WBC Count: 7.90 K/uL (01-28-24 @ 06:10)  WBC Count: 11.64 K/uL (01-27-24 @ 06:30)    Creatinine: 0.72 mg/dL (01-31-24 @ 05:55)  Creatinine: 0.64 mg/dL (01-30-24 @ 07:50)  Creatinine: 0.57 mg/dL (01-29-24 @ 06:10)  Creatinine: 0.62 mg/dL (01-28-24 @ 06:10)  Creatinine: 0.64 mg/dL (01-27-24 @ 06:30)    Procalcitonin, Serum: 0.07 ng/mL (01-24-24 @ 06:45)                < from: Xray Chest 1 View- PORTABLE-Urgent (Xray Chest 1 View- PORTABLE-Urgent .) (01.26.24 @ 05:12) >  ACC: [id number] EXAM:  XR CHEST PORTABLE URGENT 1V   ORDERED BY: VALENTIN CHIN     ACC: [id number] EXAM:  XR CHEST AP OR PA 1V   ORDERED BY: GERMAN CR     PROCEDURE DATE:  01/25/2024      INTERPRETATION:  AP chest on January 25,2024 at 2:19 PM. Patient had   left chest VATS surgery.    Heart normal for projection.    On January 24 there is a catheter left chest tube. This is been removed   and presently there are 2 left chest tubes impression. There are some   procedural changes concentrated at left base. No pneumothorax.    Follow-up AP chest on January 26, 2024 4:29 AM.    Slight increase in left base effusion.    IMPRESSION: Left chest surgery with 2 left chest tubes inserted. There is   a mild increase in left base effusion on the latest film.    --- End of Report ---    < end of copied text >

## 2024-01-31 NOTE — DISCHARGE NOTE PROVIDER - NSDCFUADDAPPT_GEN_ALL_CORE_FT
Please follow up with Dr. Pelaez in CT surgery office on ________      The cardiac surgery office is located on the first floor of [facility] at [facility] [place], [place]. Please enter through the lobby. A [facility] employee will then direct you where to go.  Please follow up with Dr. Pelaez in CT surgery office on February 5, 2024 at 10:15 A.M.   The thoracic surgery office is located on the first floor of [facility] at [address] [place] [address]. Please enter through the lobby. A [facility] employee will then direct you where to go.

## 2024-01-31 NOTE — DISCHARGE NOTE PROVIDER - DETAILS OF MALNUTRITION DIAGNOSIS/DIAGNOSES
This patient has been assessed with a concern for Malnutrition and was treated during this hospitalization for the following Nutrition diagnosis/diagnoses:     -  01/30/2024: Moderate protein-calorie malnutrition

## 2024-02-02 DIAGNOSIS — Z98.890 OTHER SPECIFIED POSTPROCEDURAL STATES: ICD-10-CM

## 2024-02-12 ENCOUNTER — APPOINTMENT (OUTPATIENT)
Dept: THORACIC SURGERY | Facility: CLINIC | Age: 59
End: 2024-02-12
Payer: COMMERCIAL

## 2024-02-12 VITALS
WEIGHT: 202 LBS | SYSTOLIC BLOOD PRESSURE: 124 MMHG | HEART RATE: 109 BPM | OXYGEN SATURATION: 97 % | HEIGHT: 71 IN | BODY MASS INDEX: 28.28 KG/M2 | DIASTOLIC BLOOD PRESSURE: 86 MMHG | RESPIRATION RATE: 16 BRPM | TEMPERATURE: 98.5 F

## 2024-02-12 DIAGNOSIS — Z83.6 FAMILY HISTORY OF OTHER DISEASES OF THE RESPIRATORY SYSTEM: ICD-10-CM

## 2024-02-12 DIAGNOSIS — E11.9 TYPE 2 DIABETES MELLITUS W/OUT COMPLICATIONS: ICD-10-CM

## 2024-02-12 DIAGNOSIS — I10 ESSENTIAL (PRIMARY) HYPERTENSION: ICD-10-CM

## 2024-02-12 PROCEDURE — 99024 POSTOP FOLLOW-UP VISIT: CPT

## 2024-02-12 RX ORDER — AMOXICILLIN AND CLAVULANATE POTASSIUM 500; 125 MG/1; 1/1
TABLET, FILM COATED ORAL
Refills: 0 | Status: ACTIVE | COMMUNITY

## 2024-02-12 RX ORDER — MULTIVIT-MIN/IRON/FOLIC ACID/K 18-600-40
CAPSULE ORAL
Refills: 0 | Status: ACTIVE | COMMUNITY

## 2024-02-12 NOTE — PHYSICAL EXAM
[] : no respiratory distress [Auscultation Breath Sounds / Voice Sounds] : lungs were clear to auscultation bilaterally [Heart Rate And Rhythm] : heart rate was normal and rhythm regular [Heart Sounds] : normal S1 and S2 [Heart Sounds Gallop] : no gallops [Murmurs] : no murmurs [Heart Sounds Pericardial Friction Rub] : no pericardial rub [FreeTextEntry1] : Wounds are healing well [No Focal Deficits] : no focal deficits

## 2024-02-12 NOTE — HISTORY OF PRESENT ILLNESS
[FreeTextEntry1] : Mr. NINFA BLANCHARD is a 58-year-old male who presents for a postoperative follow up appointment. He is status post flexible bronchoscopy, left thoracoscopy, total lung decortication, and multilevel intercostal nerve blocks from 1/25/24.  Initially, he presented to [facility] with left chest wall pain and SOB. Found to have pleural effusion and RT sided lung nodule. Chest tube was placed which revealed evidence of empyema - fluid +MSSA. Hospital course noted for COVID19 infection, now resolved. PT underwent LT VATS, total decortication on 1/25. Postop course uncomplicated. Chest tubes removed. Uncontrolled diabetic, new to insulin - will be discharged on home insulin regiment with endocrinology followup. Patient will also be discharged on Augmentin through 2/22/24. Discharged to home on 1/31/24.   Past medical history includes HTN, DM2 (a1c 14.4), active smoker.  Today the patient reports that he is feeling well, other than mild discomfort to the left chest wall surgical site. He denies chest pain, palpitations, shortness of breath, cough, dysphagia, nausea/vomiting, fevers, chills, fatigue, unintentional weight loss or gain, and night sweats.  Patient has an upcoming appointment with endocrine on 1/19/24.

## 2024-02-12 NOTE — ASSESSMENT
[FreeTextEntry1] : Left chest wall suture removed.   Andrew is a 58-year-old male who recently underwent a left thoracoscopy and decortication is done well.  He does have small cavitary nodules on the right lung which can be followed with a CT scan in 2 months time.  I look forward to seeing him after that is complete.  Thank you for allowing me to participate in the care of your patient.  Keanu Pelaez MD Department of Cardiovascular and Thoracic Surgery  [employer] at [facility]

## 2024-02-12 NOTE — DATA REVIEWED
[FreeTextEntry1] : Accession:                             [id number] Collected Date/Time:                   1/25/2024 17:00 EST Received Date/Time:                    1/26/2024 11:29 EST  Surgical Pathology Report - Auth (Verified)  Specimen(s) Submitted 1  Products of decortication  Final Diagnosis Products of decortication acutely inflamed pleural tissue and fibrin.  Verified by: Chris Khalil MD (Electronic Signature) Reported on: 01/31/24 16:04 EST       Accession:                             [id number] Collected Date/Time:                   1/19/2024 22:15 EST Received Date/Time:                    1/19/2024 22:15 EST  Non-Gynecologic Report - Auth (Verified)  Specimen(s) Submitted PLEURAL FLUID, LEFT  Final Diagnosis PLEURAL FLUID, LEFT NEGATIVE FOR MALIGNANT CELLS.  The slides and cell block consist of purulent material, mesothelial cells and  histiocytes.  Screened by: Suzie MARIE (ASCP) Verified by: SAMUEL SAVAGE M.D. (Electronic Signature) Reported on: 01/23/24 16:15 EST

## 2024-02-17 LAB
CULTURE RESULTS: SIGNIFICANT CHANGE UP
SPECIMEN SOURCE: SIGNIFICANT CHANGE UP

## 2024-02-19 ENCOUNTER — APPOINTMENT (OUTPATIENT)
Dept: ENDOCRINOLOGY | Facility: CLINIC | Age: 59
End: 2024-02-19
Payer: COMMERCIAL

## 2024-02-19 VITALS
OXYGEN SATURATION: 99 % | SYSTOLIC BLOOD PRESSURE: 126 MMHG | BODY MASS INDEX: 28.42 KG/M2 | DIASTOLIC BLOOD PRESSURE: 84 MMHG | HEIGHT: 71 IN | HEART RATE: 106 BPM | WEIGHT: 203 LBS

## 2024-02-19 LAB — GLUCOSE BLDC GLUCOMTR-MCNC: 195

## 2024-02-19 PROCEDURE — 99215 OFFICE O/P EST HI 40 MIN: CPT

## 2024-02-19 PROCEDURE — 99406 BEHAV CHNG SMOKING 3-10 MIN: CPT

## 2024-02-19 PROCEDURE — 82962 GLUCOSE BLOOD TEST: CPT

## 2024-02-19 RX ORDER — BLOOD-GLUCOSE METER
70 EACH MISCELLANEOUS
Qty: 4 | Refills: 0 | Status: ACTIVE | COMMUNITY
Start: 2024-02-19 | End: 1900-01-01

## 2024-02-19 RX ORDER — BLOOD-GLUCOSE SENSOR
EACH MISCELLANEOUS
Qty: 2 | Refills: 6 | Status: ACTIVE | COMMUNITY
Start: 2024-02-19 | End: 1900-01-01

## 2024-02-19 RX ORDER — BLOOD SUGAR DIAGNOSTIC
STRIP MISCELLANEOUS
Qty: 2 | Refills: 0 | Status: ACTIVE | COMMUNITY
Start: 2024-02-19 | End: 1900-01-01

## 2024-02-19 NOTE — PHYSICAL EXAM
[Alert] : alert [Thyroid Not Enlarged] : the thyroid was not enlarged [No Thyroid Nodules] : no palpable thyroid nodules [No Accessory Muscle Use] : no accessory muscle use [Normal Rate and Effort] : normal respiratory rate and effort [Clear to Auscultation] : lungs were clear to auscultation bilaterally [Normal Rate] : heart rate was normal [Regular Rhythm] : with a regular rhythm [Oriented x3] : oriented to person, place, and time [Normal Affect] : the affect was normal [Normal Mood] : the mood was normal

## 2024-02-19 NOTE — REVIEW OF SYSTEMS
[Fatigue] : no fatigue [Recent Weight Gain (___ Lbs)] : no recent weight gain [Recent Weight Loss (___ Lbs)] : no recent weight loss [Chest Pain] : no chest pain [Palpitations] : no palpitations [Shortness Of Breath] : no shortness of breath [Nausea] : no nausea [Constipation] : no constipation [Vomiting] : no vomiting [Diarrhea] : no diarrhea [Polyuria] : no polyuria [Polydipsia] : no polydipsia

## 2024-02-19 NOTE — HISTORY OF PRESENT ILLNESS
[FreeTextEntry1] : HPI from hospital: 58 y.o. Male active smoker with PMH of DM, HTN who was sent to the ED for abnormal outpatient CT chest. Patient reported left sided CP associated with dry cough and shortness of breath. CT chest showed L. sided pleural effusion and right sided lung nodules. Patient reported decrease appetite and weight loss (unintentional). Denies fever or night sweats. COVID positive. Endocrinology consult requested for uncontrolled DM. Diagnosed ~ 6 months ago. Started on oral medication which he can not recall the name (home med list shows MF  mg daily). Self stopped it 1 month after starting with no clear reason and resume it 2 weeks ago. A1C 14%. Denies keeping any . Does not check SMBG. Does not have much knowledge of diabetes. Patient now presents to the office for a follow up visit.   Interval history since discharge: no changes in health since he has been discharged- he's feeling much better. Has not been taking Humalog during the week because it's difficult with his work schedule.  History of DM: Diagnosed: Summer of 2023 Severity: Uncontrolled Home regimen: Lantus 30 units QHS Metformin  mg daily Lispro 12 units TID AC- only taking on weekends, but even then not consistent   FS in office: 195 ate 4 hours ago- sausage and egg sandwich  SMB-3x per day Every day before bed: 100-130's, sometimes in 180-210 FB, 136, 122 Hypoglycemia: denies Eye doctor: more than a year ago Neuropathy: denies Kidney disease: denies   Smoking: current smoker, 10 cigarettes a day Exercise: active for work but otherwise sedentary Diet:  B: sandwich on "dark" bread L: sandwich D: omelet, rice, chicken Snacks: none Drinks: sugar free ginger ale and water   Labs from hospital: A1C 14.4% C-peptide 1.2, glucose 199 PHILL negative ICA negative Cr 0.72,

## 2024-02-19 NOTE — ASSESSMENT
[FreeTextEntry1] : T2DM- uncontrolled, goal A1C < 7 based on age - Likely due to dietary indiscretion and nature of disease - Check BG at least 2x per day - Educated on diet and exercise - Discussed long term complications of diabetes at length including MI, CVA, ESRD, Dialysis, Blindness, Amputations, Infections, Erectile dysfunction - Discussed the possible need for insulin in the future - Discussed the goals of diabetes: A1C, Fasting FS and post meal 1 hour FS - Discussed the need to see Ophthalmology and Podiatry yearly  We agreed on the following plan: - We placed a Pam sample on you today, this will allow you to see your blood sugar continuously throughout the day. While this is in placed, you only need to check your blood sugar if you get a high or low reading to confirm. - Patient wishes to pay for Pam out of pocket- will order and send to pharmacy -  If you do not have the Pam on, please continue to check blood sugar at least 2x per day, always first thing in the morning, the second check please rotate times you check (some times before lunch, before dinner and before bedtime) - Please bring a blood sugar log to all appointments - Please return in 2 weeks to have the data downloaded and we can adjust your medications at that time - For now, continue taking Lantus 30 units at bedtime - Continue the Metformin 750 mg ER daily - Since you have not been taking the insulin Lispro before meals and your blood sugar is rarely above 200, please do NOT continue to take this at this time - Please return to the office in ~ 2 weeks with the CDE - Please make an appointment to follow up with the MD in 3 months - Please see a foot doctor - Please see an eye doctor  Current smoker- ~ 10 cigarettes a day - Tried quitting in the past, wants to try again - States when he was in hospital the patches he had worked great, he "didn't think about smoking at all" - Will order the same dose nicotine patch now   Educated patient on the benefits of tobacco smoking cessation. Advised to quit smoking. Informed patient how smoking can impact their health. Discussed alternative methods and skills to cessation including oral medications, nicotine chewing gum, nicotine patch and support groups. Total time spent was 5 minutes discussing smoking cessation.   RTO in 2 weeks with CDE RTO in 3 months with MD, labs prior

## 2024-02-24 LAB
CULTURE RESULTS: SIGNIFICANT CHANGE UP
SPECIMEN SOURCE: SIGNIFICANT CHANGE UP

## 2024-03-07 ENCOUNTER — APPOINTMENT (OUTPATIENT)
Dept: ENDOCRINOLOGY | Facility: CLINIC | Age: 59
End: 2024-03-07
Payer: SELF-PAY

## 2024-03-07 PROCEDURE — G0108 DIAB MANAGE TRN  PER INDIV: CPT | Mod: GA

## 2024-03-07 RX ORDER — PEN NEEDLE, DIABETIC 32GX 5/32"
32G X 4 MM NEEDLE, DISPOSABLE MISCELLANEOUS
Qty: 4 | Refills: 1 | Status: ACTIVE | COMMUNITY
Start: 2024-03-07 | End: 1900-01-01

## 2024-03-07 RX ORDER — ISOPROPYL ALCOHOL 70 ML/100ML
SWAB TOPICAL
Qty: 1 | Refills: 2 | Status: ACTIVE | COMMUNITY
Start: 2024-03-07 | End: 1900-01-01

## 2024-03-07 RX ORDER — BLOOD SUGAR DIAGNOSTIC
STRIP MISCELLANEOUS
Qty: 1 | Refills: 1 | Status: ACTIVE | COMMUNITY
Start: 2024-03-07 | End: 1900-01-01

## 2024-03-13 LAB
CULTURE RESULTS: SIGNIFICANT CHANGE UP
SPECIMEN SOURCE: SIGNIFICANT CHANGE UP

## 2024-03-23 NOTE — PHYSICAL THERAPY INITIAL EVALUATION ADULT - TRANSFER SKILLS, REHAB EVAL
EMERGENCY DEPARTMENT NOTE      History and Physical     Patient: Charley Bethea      MRN: [id number]     YOB: 1949     Subjective:     CC: Abnormal Lab      HPI: Charley Bethea is a 74 year old female, with past medical, surgical, social, and family history reviewed as documented below     74-year-old with past medical history seen below presented to the emergency room with abnormal lab potassium reportedly 6.5.  Patient denies any shortness of breath, difficulty breathing, nausea, vomiting, diarrhea, constipation, palpitations.  Patient is not on dialysis.  Has no history of hyperkalemia.  Had once an erroneous hemolyzed result that reported hyperkalemia but has never had a true result    History:     Past Medical History:   Diagnosis Date    Anxiety disorder     Carcinoma of endometrium (CMD)     Colon polyps     Epiretinal membrane 01/27/2020    Follow up on, epiretinal membrane, macular hole, cataract, diabetes    Eye exam, routine 09/12/2018    Type ll Diabetes without complication    Eye exam, routine 06/17/2020    Fracture, femur (CMD) 01/12/2023    left    Hypercholesterolemia     Hypertension, essential     Hypothyroidism     Lung mass     Multiple fractures of ribs     Nonexudative age-related macular degeneration, right eye, intermediate dry stage 01/26/2022    Osteoporosis 10/2022    Pulmonary embolism (CMD)     Type 2 diabetes mellitus without complication (CMD) 09/21/2017    Type II or unspecified type diabetes mellitus without mention of complication, not stated as uncontrolled 10/23/2012     Past Surgical History:   Procedure Laterality Date    Breast biopsy      right, benign    Cataract extraction, bilateral      Colonoscopy  01/04/2023    diverticulosis    Colonoscopy diagnostic  07/18/2010    Colonoscopy, Dx    Dexa bone density axial skeleton  06/14/2008    Discission,2nd cataract,laser Left 04/05/2023    Eye surgery Left 10/2019    macular hole repair    Hand surgery   06/01/2010    left with tendon portion implanted left carpometacarpal first    Hernia repair  02/09/2019    Hysterectomy      uterine ca    Orif femur fracture Left 11/12/2023    Thoracotomy w diag bx(s) lung nod or mass uni  01/09/2013    benign nodule(inf)    Tonsillectomy and adenoidectomy      Total hip replacement      Wrist surgery  12/01/2005    right for arthritic changes with tendon transplant     Family History   Problem Relation Age of Onset    Osteoarthritis Mother     Cancer Mother     Memory loss Mother     Aneurysm Father         Past in 1994 age 75     Social History     Tobacco Use    Smoking status: Never    Smokeless tobacco: Never   Vaping Use    Vaping Use: never used   Substance Use Topics    Alcohol use: Yes     Alcohol/week: 2.0 standard drinks of alcohol     Types: 2 Glasses of wine per week     Current Outpatient Medications   Medication Instructions    alendronate (FOSAMAX) 70 mg, Oral, EVERY 7 DAYS    ASPIRIN 81 PO Oral, DAILY    atorvastatin (LIPITOR) 80 mg, Oral, DAILY    Basaglar KwikPen 85 Units, Subcutaneous, NIGHTLY, Prime 2 units before each dose.    BD Pen Needle Micro U/F 32G X 6 MM Misc USE TO INJECT INSULIN UNDER THE SKIN FOUR TIMES DAILY(REMOVE NEEDLE COVER(S) TO EXPOSE NEEDLE BEFORE INJECTING)    Biotin 76646 MCG Tab Oral, DAILY    blood glucose test strip One touch ultra 2 test strips  QID    Continuous Blood Gluc Sensor (FreeStyle Clarissa 14 Day Sensor) Misc 1 Device, Does not apply, 3 TIMES DAILY    desoximetasone 0.05 % ointment Apply daily on the lower legs and feet for 4 weeks. Stop when clear.    Dispense (CHECK, UNKNOWN CONCENTRATION) 3 TIMES DAILY    divalproex (DEPAKOTE ER) 500 mg, Oral, DAILY    DULoxetine (CYMBALTA) 60 mg, Oral, 2 TIMES DAILY    fosinopril (MONOPRIL) 20 mg, Oral, DAILY    furosemide (LASIX) 20 MG tablet One tablet 3 times a day    gabapentin (NEURONTIN) 600 mg, Oral, 2 TIMES DAILY    Insulin Lispro (1 Unit Dial) (HUMALOG KWIKPEN) 20 Units,  Subcutaneous, 3 TIMES DAILY BEFORE MEALS, Prime 2 units before each dose.    levothyroxine 137 MCG tablet One daily    lidocaine (Lidoderm) 5 % patch 1 patch, Transdermal, EVERY 24 HOURS, Remove patch 12 hours after applying    metFORMIN (GLUCOPHAGE-XR) 500 mg, Oral, DAILY WITH BREAKFAST    metFORMIN (GLUCOPHAGE-XR) 500 mg, Oral, DAILY WITH BREAKFAST    mometasone (ELOCON) 0.1 % cream APPLY A SMALL AMOUNT TOPICALLY TO THE AFFECTED AREA TWICE DAILY    nystatin (MYCOSTATIN) 500,000 Units, Oral, 4 TIMES DAILY    oxybutynin (DITROPAN) 5 mg, Oral, 3 TIMES DAILY, For urge incontinance    rOPINIRole (REQUIP) 2 mg, Oral, DAILY    triamcinolone (ARISTOCORT) 0.1 % ointment Topical, 2 TIMES DAILY, Apply sparingly to areas of lower legs with red itchy patches. STOP when clear    Victoza 1.2 mg, Subcutaneous, DAILY     ALLERGIES:   Allergen Reactions    Clindamycin/Lincomycin PRURITUS    Erythromycin PRURITUS     Patient stated she is allergic to all medication containing mycin.      Latex   (Environmental) Other (See Comments)    Tape [Adhesive   (Environmental)] RASH       Physical Exam:     Vitals:    03/23/24 0233 03/23/24 0500 03/23/24 0530 03/23/24 0537   BP: (!) 181/91 (!) 191/78 (!) 186/78 (!) 179/79   Pulse: 76 (!) 60 66 65   Resp: 18 14 15 16   Temp: 98.4 °F (36.9 °C)      SpO2: 100% 100% 99% 99%   Weight: 69.2 kg (152 lb 8.9 oz)        Nursing notes reviewed and agreed with.    Physical Exam  Constitutional:       General: She is not in acute distress.     Appearance: She is not toxic-appearing.   HENT:      Head: Normocephalic.      Nose: Nose normal.      Mouth/Throat:      Mouth: Mucous membranes are moist.   Eyes:      Extraocular Movements: Extraocular movements intact.      Pupils: Pupils are equal, round, and reactive to light.   Cardiovascular:      Rate and Rhythm: Normal rate and regular rhythm.      Pulses: Normal pulses.   Pulmonary:      Effort: Pulmonary effort is normal.   Abdominal:      General:  Abdomen is flat. There is no distension.      Tenderness: There is no abdominal tenderness. There is no guarding.   Musculoskeletal:         General: Normal range of motion.      Cervical back: Normal range of motion.   Skin:     General: Skin is warm and dry.   Neurological:      General: No focal deficit present.      Mental Status: She is alert and oriented to person, place, and time.         Orders placed:  No orders of the defined types were placed in this encounter.    Labs/Data Reviewed:  Results for orders placed or performed during the hospital encounter of 03/23/24   Comprehensive Metabolic Panel   Result Value Ref Range    Fasting Status      Sodium 127 (L) 135 - 145 mmol/L    Potassium 5.0 3.4 - 5.1 mmol/L    Chloride 95 (L) 97 - 110 mmol/L    Carbon Dioxide 27 21 - 32 mmol/L    Anion Gap 10 7 - 19 mmol/L    Glucose 764 (HH) 70 - 99 mg/dL    BUN 49 (H) 6 - 20 mg/dL    Creatinine 1.45 (H) 0.51 - 0.95 mg/dL    Glomerular Filtration Rate 38 (L) >=60    BUN/Cr 34 (H) 7 - 25    Calcium 8.8 8.4 - 10.2 mg/dL    Bilirubin, Total 0.7 0.2 - 1.0 mg/dL    GOT/AST 84 (H) <=37 Units/L    GPT/ (H) <64 Units/L    Alkaline Phosphatase 277 (H) 45 - 117 Units/L    Albumin 3.5 (L) 3.6 - 5.1 g/dL    Protein, Total 7.4 6.4 - 8.2 g/dL    Globulin 3.9 2.0 - 4.0 g/dL    A/G Ratio 0.9 (L) 1.0 - 2.4   CBC with Automated Differential (performable only)   Result Value Ref Range    WBC 9.8 4.2 - 11.0 K/mcL    RBC 4.16 4.00 - 5.20 mil/mcL    HGB 12.5 12.0 - 15.5 g/dL    HCT 37.3 36.0 - 46.5 %    MCV 89.7 78.0 - 100.0 fl    MCH 30.0 26.0 - 34.0 pg    MCHC 33.5 32.0 - 36.5 g/dL    RDW-CV 13.2 11.0 - 15.0 %    RDW-SD 43.4 39.0 - 50.0 fL     140 - 450 K/mcL    NRBC 0 <=0 /100 WBC    Neutrophil, Percent 50 %    Lymphocytes, Percent 42 %    Mono, Percent 6 %    Eosinophils, Percent 1 %    Basophils, Percent 1 %    Immature Granulocytes 0 %    Absolute Neutrophils 4.9 1.8 - 7.7 K/mcL    Absolute Lymphocytes 4.1 (H) 1.0 - 4.0  K/mcL    Absolute Monocytes 0.6 0.3 - 0.9 K/mcL    Absolute Eosinophils  0.1 0.0 - 0.5 K/mcL    Absolute Basophils 0.1 0.0 - 0.3 K/mcL    Absolute Immature Granulocytes 0.0 0.0 - 0.2 K/mcL   GLUCOSE, BEDSIDE - POINT OF CARE   Result Value Ref Range    GLUCOSE, BEDSIDE - POINT OF CARE >600 (HH) 70 - 99 mg/dL   GLUCOSE, BEDSIDE - POINT OF CARE   Result Value Ref Range    GLUCOSE, BEDSIDE - POINT OF CARE >600 (HH) 70 - 99 mg/dL   GLUCOSE, BEDSIDE - POINT OF CARE   Result Value Ref Range    GLUCOSE, BEDSIDE - POINT OF CARE 438 (H) 70 - 99 mg/dL       Imaging:    No orders to display       EKG with Rhythm Strip and Cardiac Monitor:     ECG/Rhythm Strip:   ECG: Rate: 71 bpm.  QRS 92ms. QTc 435ms.    NSR   No ischemic changes noted . Occasional PVCs    My interpretation is normal rate, normal rhythm,no ectopy    Pulse Ox was ordered and reviewed: 99% on RA. adequate    Procedures:   Procedures      Assessment / Plan / MDM:   Charley Bethea is a 74 year old female, with history, as above, who presents to ED with chief complaint of Abnormal Lab    After obtaining the patient's history, performing the physical exam, multiple initial diagnoses were considered   based on the presenting problem. Initial impressions/plan prior to diagnostics as follows:     Differential Diagnosis: Hyperglycemia without DKA additional diagnoses considered including     DKA, Hyperkalemia, HHS    MDM:  -Patient is hemodynamically stable, afebrile, and non-toxic      Patient's EKG had no evidence of changes of hyperkalemia    Thankfully it seems it was a erroneous lab result as her outpatient labs had resolved 6.5 but her potassium here was 5.  Her creatinine level is baseline.  Her sugar was 764.  She received 20 units of lispro and then additional 25 to bring her down to 438.  She is appropriate for outpatient follow-up and discharge.  She is to keep a closer eye on her sugars.  Advised her to follow-up with her primary doctor regarding her  elevated sugars.  Given return precautions and follow-up instructions.  Discharged      ED Course as of 03/23/24 0803   Sat Mar 23, 2024   0356 Sodium(!): 127  pseudohyponatremia [DG]      ED Course User Index  [DG] Frank Perez MD       Workup:  Orders Placed This Encounter    Comprehensive Metabolic Panel    CBC with Automated Differential    CBC with Automated Differential (performable only)    Extra Tubes    Light Blue Top    Gold Top    Pink Top Tube    Electrocardiogram 12-Lead    GLUCOSE, BEDSIDE - POINT OF CARE    GLUCOSE, BEDSIDE - POINT OF CARE    GLUCOSE, BEDSIDE - POINT OF CARE    insulin lispro (ADMELOG, HumaLOG) injection 20 Units    sodium chloride (NORMAL SALINE) 0.9 % bolus 1,000 mL    insulin lispro (ADMELOG, HumaLOG) injection 25 Units       Abnormal Labs: Ordered and interpreted by me  Labs Reviewed   COMPREHENSIVE METABOLIC PANEL - Abnormal; Notable for the following components:       Result Value    Sodium 127 (*)     Chloride 95 (*)     Glucose 764 (*)     BUN 49 (*)     Creatinine 1.45 (*)     Glomerular Filtration Rate 38 (*)     BUN/Cr 34 (*)     GOT/AST 84 (*)     GPT/ (*)     Alkaline Phosphatase 277 (*)     Albumin 3.5 (*)     A/G Ratio 0.9 (*)     All other components within normal limits   CBC WITH AUTOMATED DIFFERENTIAL (PERFORMABLE ONLY) - Abnormal; Notable for the following components:    Absolute Lymphocytes 4.1 (*)     All other components within normal limits   GLUCOSE, BEDSIDE - POINT OF CARE - Abnormal; Notable for the following components:    GLUCOSE, BEDSIDE - POINT OF CARE >600 (*)     All other components within normal limits   GLUCOSE, BEDSIDE - POINT OF CARE - Abnormal; Notable for the following components:    GLUCOSE, BEDSIDE - POINT OF CARE >600 (*)     All other components within normal limits   GLUCOSE, BEDSIDE - POINT OF CARE - Abnormal; Notable for the following components:    GLUCOSE, BEDSIDE - POINT OF CARE 438 (*)     All other components within  normal limits   CBC WITH DIFFERENTIAL    Narrative:     The following orders were created for panel order CBC with Automated Differential.  Procedure                               Abnormality         Status                     ---------                               -----------         ------                     CBC with Automated Dif...[[id number]]  Abnormal            Final result                 Please view results for these tests on the individual orders.   EXTRA TUBES    Narrative:     The following orders were created for panel order Extra Tubes.  Procedure                               Abnormality         Status                     ---------                               -----------         ------                     Light Blue Top[[id number]]                                 In process                 Gold Top[[id number]]                                       In process                 Pink Top Tube[[id number]]                                  In process                   Please view results for these tests on the individual orders.   LIGHT BLUE TOP   GOLD TOP   PINK TOP TUBE       Imaging Ordered: Images ordered and reviewed by me  No orders to display       Additional history obtained from:    Additional records reviewed:[facility], care [facility], outpatient hyperkalemia lab  Additional imaging considered:  Independent interpretation of tests: blood, ecg  Additional labs considered:  Chronic conditions that impact care: Diabetes causing her elevated sugars which required multiple rounds of insulin to improve  Social barriers to care:    DIAGNOSIS: After the evaluation in the ED, my clinical impression(s) are:  1. Hyperglycemia    2. Abnormal laboratory test result        Medications Given in ED   No current facility-administered medications for this encounter.       DISPOSITION:   discharged    New prescriptions:  Discharge Medication List as of 3/23/2024  6:35 AM        Follow up:  CHSAE Guthrie  MD [facility]ip[facility]  [address] [place] [address]  [phone]    In 3 days  As needed      Frank Perez MD  8:03 AM 03/23/24       Note to patient: The 21st Century Cures Act makes medical notes like these available to patients in the interest of transparency. Please be advised this is a medical document. It is intended for [id number] communication. It is written in medical language and may contain unfamiliar abbreviations or verbiage. Components may appear blunt or direct. Medical documents are intended to carry relevant information, facts as evident, and the clinical opinion of the practitioner at the time of the encounter.     This note was generated by the Epic EMR system/ Dragon speech recognition and may contain inherent errors or omissions not intended by the user. Grammatical errors, random word insertions, deletions, pronoun errors and incomplete sentences are occasional consequences of this technology. Not all errors are caught or corrected. If there are questions or concerns about the content of this note or information contained within the body of this dictation they should be addressed directly with the author for clarification      Frank Perez MD  03/23/24 0859     independent

## 2024-03-25 ENCOUNTER — NON-APPOINTMENT (OUTPATIENT)
Age: 59
End: 2024-03-25

## 2024-05-13 PROBLEM — F17.200 CURRENT SMOKER: Status: ACTIVE | Noted: 2024-02-12

## 2024-05-13 PROBLEM — R91.8 LUNG NODULES: Status: ACTIVE | Noted: 2024-02-12

## 2024-05-13 PROBLEM — J90 PLEURAL EFFUSION: Status: ACTIVE | Noted: 2024-05-13

## 2024-05-14 ENCOUNTER — APPOINTMENT (OUTPATIENT)
Dept: THORACIC SURGERY | Facility: CLINIC | Age: 59
End: 2024-05-14
Payer: COMMERCIAL

## 2024-05-14 DIAGNOSIS — J90 PLEURAL EFFUSION, NOT ELSEWHERE CLASSIFIED: ICD-10-CM

## 2024-05-14 DIAGNOSIS — R91.8 OTHER NONSPECIFIC ABNORMAL FINDING OF LUNG FIELD: ICD-10-CM

## 2024-05-14 DIAGNOSIS — F17.200 NICOTINE DEPENDENCE, UNSPECIFIED, UNCOMPLICATED: ICD-10-CM

## 2024-05-15 ENCOUNTER — APPOINTMENT (OUTPATIENT)
Dept: THORACIC SURGERY | Facility: CLINIC | Age: 59
End: 2024-05-15
Payer: COMMERCIAL

## 2024-05-15 PROCEDURE — 99443: CPT

## 2024-05-15 NOTE — DATA REVIEWED
[FreeTextEntry1] : CT-CHEST NON CONTRAST performed at [facility] on 4/20/24: HISTORY: TECHNIQUE: Noncontrast CT of the chest was performed. Axial, coronal and sagittal images were reconstructed using iterative reconstruction technique. This study was performed using automatic exposure control (radiation dose reduction software) to obtain a diagnostic image quality scan with patient dose as low as reasonably achievable. The mA and kV were adjusted according to patient size. The administered radiation dose was 2.772 mSv.  History of pneumonia, lung nodularity, pleural effusion. A smoking history is provided.  COMPARISON: Chest CT examination 1/18/2024 and prior chest x-rays, the last of which was performed 2/12/2024.  LUNGS AND AIRWAYS: There has been marked improvement and essentially resolution with respect to the previously described cavitating nodules in the right lung. There has been marked improvement with respect to compressive atelectatic changes in the left lung. Minor fibrotic/subsegmental atelectatic changes are now present. There is a suggestion of interval development of a 4 mm left upper lobe nodule (series 5, image 81).  PLEURA: There has been marked improvement with respect to the previously seen left pleural effusion with loculation. A trace effusion remains.  THYROID GLAND: Thyroid gland is unchanged in appearance.  MEDIASTINUM, OMA: Mild mediastinal lymph node prominence is unchanged. The hilar regions are difficult to fully evaluate on noncontrast enhanced study. There is narrowing of the transverse diameter to portions of the trachea, similar to the prior CT examination correlating with prior chest radiographs dating back to 2013, of questionable significance.  HEART AND VESSELS: There is no evidence of aneurysmal dilatation of the thoracic aorta. There is no evidence of a pericardial effusion.  CORONARY CALCIUM FINDINGS: Moderate calcium noted within coronary arteries.  CHEST WALL/SOFT TISSUES/AXILLAE: There is evidence of bilateral gynecomastia, more prominent than noted previously. Focal areas of calcification or possibly metallic densities are seen within the posterior soft tissues of the upper thorax subcutaneous fat of the upper anterior left hemithorax, unchanged from the prior study.  BONES: There is no evidence of an aggressive osseous lesion.  UPPER ABDOMEN (partially imaged): Unremarkable  IMPRESSION: There has been marked improvement and almost complete resolution with respect to the previously seen loculated left pleural effusion. A trace left pleural effusion remains. There has been associated resolution with respect to compressive atelectatic changes seen in the left lung.  There has been significant improvement and near resolution with respect to previously seen cavitary nodules in the right lung.  Interval development of a 4 mm left upper lobe nodule. Follow chest CT examination in 6 months time is suggested to assess for stability.  Bilateral gynecomastia Pleural effusion J90 Pulmonary nodules (multiple) R91.8  Signed by: Chandana Higgins MD Signed Date: 4/20/2024 9:53 AM EDT SIGNED BY: Chandana Higgins M.D., Ext. [phone] 04/20/2024 09:53 AM          Accession:                             [id number] Collected Date/Time:                   1/25/2024 17:00 EST Received Date/Time:                    1/26/2024 11:29 EST  Surgical Pathology Report - Auth (Verified)  Specimen(s) Submitted 1  Products of decortication  Final Diagnosis Products of decortication acutely inflamed pleural tissue and fibrin.  Verified by: Chris Khalil MD (Electronic Signature) Reported on: 01/31/24 16:04 EST       CT-CHEST POST IV CONTRAST performed at [facility] on 1/18/24: HISTORY: R05.1 Acute Cough CT scan of the chest was performed. 70cc Omnipaque 350 was administered intravenously. Axial sagittal and coronal images were reconstructed using iterative reconstruction technique. This study was performed using automatic exposure control (radiation dose reduction software) to obtain a diagnostic image quality scan with patient dose as low as reasonably achievable. The mA and kV were adjusted according to patient size. The administered radiation dose was 3.514 mSv.  Cough and left-sided rib pain. Smoking history is provided.  COMPARISON: Chest x-ray 1/16/2024  LUNGS AND AIRWAYS: There is evidence of a 7 mm cavitary nodule in the right middle lobe (series 6, image 232). There are 2 similar-appearing and similar size cavitating nodules in the right lower lobe (images 251, 277). Compressive atelectatic changes are seen within portions of the left upper lobe and left lower lobe as result of loculated fluid collections most consistent loculated pleural effusions.  PLEURA: There is a loculated pleural effusion in the left upper to mid hemithorax (series 4, images 14-40). There is an ovoid shaped fluid collection more inferiorly and posteriorly in left hemithorax also consistent with a loculated pleural effusion (images 30-86). A small free left pleural effusion is noted. The pulmonary arterial tree is not opacified well enough to exclude pathology such as pulmonary embolic disease.  THYROID GLAND: There is no evidence of thyroid gland enlargement.  MEDIASTINUM, OMA: There is evidence of mildly enlarged mediastinal lymph nodes, most prominent in the subcarinal region. There is also suggestion of mild bilateral hilar adenopathy.  HEART AND VESSELS: There is no evidence of aneurysmal dilatation of the thoracic aorta. There is no evidence of a pericardial effusion.  CHEST WALL/SOFT TISSUES/AXILLAE: There is no evidence of axillary lymphadenopathy.  BONES: There is no evidence of an aggressive osseous lesion.  UPPER ABDOMEN (partially imaged): There is no evidence of an adrenal mass. Findings of hepatic steatosis are noted.   IMPRESSION: Loculated left pleural effusion. In addition to the presence of a small free left pleural effusion, there is evidence of 2 large fluid containing structures in the left hemithorax most consistent areas of loculated pleural effusion. These findings are associated with compressive atelectasis involving portions of the left upper lobe and left lower lobe. CT-guided drainage/aspiration can be considered for further evaluation.  Right lung cavitary nodules. There is evidence of 3 subcentimeter cavitating nodules in the right lung, 2 in the right lower lobe and one in the right middle lobe. There is also suggestion of minor mediastinal and bilateral hilar lymph node prominence. Differential diagnosis includes both infectious and neoplastic etiologies. Findings such as this can also be seen with septic pulmonary emboli.  Hepatic steatosis Pulmonary nodules (multiple) R91.8 Pleural effusion J90  Signed by: Chandana Higgins MD Signed Date: 1/18/2024 2:17 PM EST SIGNED BY: Chandana Higgins M.D., Ext. [phone] 01/18/2024 02:17 PM

## 2024-05-15 NOTE — HISTORY OF PRESENT ILLNESS
[FreeTextEntry1] : Mr. NINFA BLANCHARD is a 59-year-old male who presents for a follow up appointment. He is status post flexible bronchoscopy, left thoracoscopy, total lung decortication, and multilevel intercostal nerve blocks from 1/25/24. He presents today to review surveillance CT imaging.  Initially, he presented to [facility] with left chest wall pain and SOB. Found to have pleural effusion and RT sided lung nodule. Chest tube was placed which revealed evidence of empyema - fluid +MSSA. Hospital course noted for COVID19 infection, now resolved. PT underwent LT VATS, total decortication on 1/25. Postop course uncomplicated. Chest tubes removed. Uncontrolled diabetic, new to insulin - will be discharged on home insulin regiment with endocrinology followup. Patient will also be discharged on Augmentin through 2/22/24. Discharged to home on 1/31/24.  Past medical history includes HTN, DM2 (a1c 14.4), active smoker.

## 2024-05-15 NOTE — ASSESSMENT
[FreeTextEntry1] : Andrew is a 59-year-old male with a history of a decortication and several small lung nodules who recently underwent a CT scan that showed significant improvement.  Radiology has recommended a CT in 6 months and I will be arranging that through my office.  Thank you for allowing me to participate in the care of your patient.  30 minutes was spent during this encounter.  eKanu Pelaez MD Department of Cardiovascular and Thoracic Surgery  [employer] at [facility]

## 2024-05-20 ENCOUNTER — APPOINTMENT (OUTPATIENT)
Dept: ENDOCRINOLOGY | Facility: CLINIC | Age: 59
End: 2024-05-20
Payer: COMMERCIAL

## 2024-05-20 VITALS
HEART RATE: 86 BPM | DIASTOLIC BLOOD PRESSURE: 90 MMHG | RESPIRATION RATE: 15 BRPM | WEIGHT: 209 LBS | HEIGHT: 71 IN | OXYGEN SATURATION: 97 % | SYSTOLIC BLOOD PRESSURE: 138 MMHG | BODY MASS INDEX: 29.26 KG/M2

## 2024-05-20 VITALS — DIASTOLIC BLOOD PRESSURE: 86 MMHG | SYSTOLIC BLOOD PRESSURE: 128 MMHG

## 2024-05-20 DIAGNOSIS — Z72.0 TOBACCO USE: ICD-10-CM

## 2024-05-20 LAB
BASOPHILS # BLD AUTO: 0.06 K/UL
BASOPHILS NFR BLD AUTO: 0.9 %
EOSINOPHIL # BLD AUTO: 0.27 K/UL
EOSINOPHIL NFR BLD AUTO: 4.1 %
GLUCOSE BLDC GLUCOMTR-MCNC: 137
HCT VFR BLD CALC: 49.6 %
HGB BLD-MCNC: 16.5 G/DL
IMM GRANULOCYTES NFR BLD AUTO: 0.3 %
LYMPHOCYTES # BLD AUTO: 2.03 K/UL
LYMPHOCYTES NFR BLD AUTO: 30.6 %
MAN DIFF?: NORMAL
MCHC RBC-ENTMCNC: 31.1 PG
MCHC RBC-ENTMCNC: 33.3 GM/DL
MCV RBC AUTO: 93.6 FL
MONOCYTES # BLD AUTO: 0.61 K/UL
MONOCYTES NFR BLD AUTO: 9.2 %
NEUTROPHILS # BLD AUTO: 3.64 K/UL
NEUTROPHILS NFR BLD AUTO: 54.9 %
PLATELET # BLD AUTO: 225 K/UL
RBC # BLD: 5.3 M/UL
RBC # FLD: 12.9 %
WBC # FLD AUTO: 6.63 K/UL

## 2024-05-20 PROCEDURE — 99214 OFFICE O/P EST MOD 30 MIN: CPT

## 2024-05-20 PROCEDURE — 95251 CONT GLUC MNTR ANALYSIS I&R: CPT

## 2024-05-20 PROCEDURE — 82962 GLUCOSE BLOOD TEST: CPT

## 2024-05-20 RX ORDER — INSULIN GLARGINE 100 [IU]/ML
100 INJECTION, SOLUTION SUBCUTANEOUS
Qty: 3 | Refills: 0 | Status: DISCONTINUED | COMMUNITY
Start: 2024-02-19 | End: 2024-05-20

## 2024-05-20 RX ORDER — NICOTINE 21 MG/24HR
14 PATCH, TRANSDERMAL 24 HOURS TRANSDERMAL DAILY
Qty: 3 | Refills: 0 | Status: ACTIVE | COMMUNITY
Start: 2024-02-19 | End: 1900-01-01

## 2024-05-20 NOTE — HISTORY OF PRESENT ILLNESS
[FreeTextEntry1] : interval hx:  Denies any changes in his health since last visit. Feeling well. No complaints. Sugar improved. Paying for Pam out of pocket.  Initial hx from hospital: 58 y.o. Male active smoker with PMH of DM, HTN who was sent to the ED for abnormal outpatient CT chest. Patient reported left sided CP associated with dry cough and shortness of breath. CT chest showed L. sided pleural effusion and right sided lung nodules. Patient reported decrease appetite and weight loss (unintentional). Denies fever or night sweats. COVID positive. Endocrinology consult requested for uncontrolled DM. Diagnosed ~ 6 months ago. Started on oral medication which he can not recall the name (home med list shows MF  mg daily). Self stopped it 1 month after starting with no clear reason and resume it 2 weeks ago. A1C 14%. Denies keeping any diet. Does not check SMBG. Does not have much knowledge of diabetes.    History of DM: Diagnosed: Summer of 2023 Severity: Uncontrolled Home regimen: Lantus 30 units QHS-> stopped taking because sugar was going too low Metformin  mg daily Lispro 8 units-> takes intermittently, sometimes only once a day before breakfast   SMBG: PAM Pam data reviewed Av Active CGM: 96% GMI: 6.9% Variability: 19.3% Very High: 0% High: 13% In Target: 87% Low: 0% Very Low: 0%   Hypoglycemia: Pam above Eye doctor: more than a year ago Neuropathy: denies Kidney disease: denies   Smoking: current smoker, down to 8 or less cigarettes a day Exercise: active for work but otherwise sedentary Diet:  B: sandwich on "dark" bread L: sandwich D: omelet, rice, chicken Snacks: none Drinks: sugar free ginger ale and water   Labs from hospital: A1C 14.4% C-peptide 1.2, glucose 199 PHILL negative ICA negative Cr 0.72,   No recent blood work  all other ROS reviewed and are negative

## 2024-05-20 NOTE — ASSESSMENT
[FreeTextEntry1] : T2DM- goal A1C < 7 based on age - Need updated blood work, GMI on Pam is 6.9% - Patient wishes to pay for Pam out of pocket- continue - Patient intermittently taking Lispro before breakfast- self stopped Lantus 2/2 hypoglycemia - Will stop Lispro and likely increase Metformin to 1000 mg BID, however need updated blood work prior, patient to go after appointment today as he is fasting - Please make an appointment to follow up with the MD in 3 months - Please see a foot doctor - Please see an eye doctor   Current smoker- ~ 8 or less cigarettes a day - Nicotine patches are helping, will re-order    Educated patient on the benefits of tobacco smoking cessation. Advised to quit smoking. Informed patient how smoking can impact their health. Discussed alternative methods and skills to cessation including oral medications, nicotine chewing gum, nicotine patch. Total time spent was 4 minutes discussing smoking cessation.   RTO in 3 months with MD, labs prior

## 2024-05-21 DIAGNOSIS — E78.00 PURE HYPERCHOLESTEROLEMIA, UNSPECIFIED: ICD-10-CM

## 2024-05-21 DIAGNOSIS — E11.65 TYPE 2 DIABETES MELLITUS WITH HYPERGLYCEMIA: ICD-10-CM

## 2024-05-21 LAB
ALBUMIN SERPL ELPH-MCNC: 4.6 G/DL
ALP BLD-CCNC: 74 U/L
ALT SERPL-CCNC: 22 U/L
ANION GAP SERPL CALC-SCNC: 12 MMOL/L
AST SERPL-CCNC: 22 U/L
BILIRUB SERPL-MCNC: 0.4 MG/DL
BUN SERPL-MCNC: 13 MG/DL
CALCIUM SERPL-MCNC: 9.3 MG/DL
CHLORIDE SERPL-SCNC: 102 MMOL/L
CHOLEST SERPL-MCNC: 194 MG/DL
CO2 SERPL-SCNC: 24 MMOL/L
CREAT SERPL-MCNC: 0.76 MG/DL
CREAT SPEC-SCNC: 164 MG/DL
EGFR: 103 ML/MIN/1.73M2
ESTIMATED AVERAGE GLUCOSE: 146 MG/DL
GLUCOSE SERPL-MCNC: 102 MG/DL
HBA1C MFR BLD HPLC: 6.7 %
HDLC SERPL-MCNC: 64 MG/DL
LDLC SERPL CALC-MCNC: 112 MG/DL
MICROALBUMIN 24H UR DL<=1MG/L-MCNC: 2.7 MG/DL
MICROALBUMIN/CREAT 24H UR-RTO: 16 MG/G
NONHDLC SERPL-MCNC: 130 MG/DL
POTASSIUM SERPL-SCNC: 4.1 MMOL/L
PROT SERPL-MCNC: 7 G/DL
SODIUM SERPL-SCNC: 138 MMOL/L
TRIGL SERPL-MCNC: 100 MG/DL

## 2024-05-21 RX ORDER — METFORMIN ER 500 MG 500 MG/1
500 TABLET ORAL
Qty: 360 | Refills: 1 | Status: ACTIVE | COMMUNITY
Start: 2024-05-21 | End: 1900-01-01

## 2024-05-21 RX ORDER — ATORVASTATIN CALCIUM 10 MG/1
10 TABLET, FILM COATED ORAL
Qty: 1 | Refills: 0 | Status: ACTIVE | COMMUNITY
Start: 2024-05-21 | End: 1900-01-01

## 2024-08-26 ENCOUNTER — RX RENEWAL (OUTPATIENT)
Age: 59
End: 2024-08-26

## 2024-10-03 ENCOUNTER — APPOINTMENT (OUTPATIENT)
Dept: ENDOCRINOLOGY | Facility: CLINIC | Age: 59
End: 2024-10-03
Payer: SELF-PAY

## 2024-10-03 VITALS
OXYGEN SATURATION: 97 % | HEIGHT: 71 IN | DIASTOLIC BLOOD PRESSURE: 80 MMHG | HEART RATE: 86 BPM | WEIGHT: 207 LBS | BODY MASS INDEX: 28.98 KG/M2 | SYSTOLIC BLOOD PRESSURE: 130 MMHG

## 2024-10-03 DIAGNOSIS — E78.00 PURE HYPERCHOLESTEROLEMIA, UNSPECIFIED: ICD-10-CM

## 2024-10-03 DIAGNOSIS — E11.65 TYPE 2 DIABETES MELLITUS WITH HYPERGLYCEMIA: ICD-10-CM

## 2024-10-03 DIAGNOSIS — I10 ESSENTIAL (PRIMARY) HYPERTENSION: ICD-10-CM

## 2024-10-03 PROCEDURE — 99214 OFFICE O/P EST MOD 30 MIN: CPT

## 2024-10-03 PROCEDURE — 99407 BEHAV CHNG SMOKING > 10 MIN: CPT

## 2024-10-03 PROCEDURE — G2211 COMPLEX E/M VISIT ADD ON: CPT

## 2024-10-03 NOTE — HISTORY OF PRESENT ILLNESS
[FreeTextEntry1] :  follow-up visit for DM Type 2.   History of DM: Diagnosed: Summer of 2023 Severity: Uncontrolled Home regimen: Was on insulin but stopped due to low sugars Metformin  two tab twice daily total of 2000 daily   Has pam 3+ Hypoglycemia: Pam above Eye doctor: due  Neuropathy: denies Kidney disease: denies  Smoking: current smoker, down to 10 or less cigarettes a day Exercise: active for work but otherwise sedentary   Labs from hospital: A1C 14.4% C-peptide 1.2, glucose 199 PHILL negative ICA negative Cr 0.72,   5/2024 A1c 6.7

## 2024-10-03 NOTE — ASSESSMENT
[FreeTextEntry1] : Type  2DM w   CGM interpretation    0%Very high    2%High  98 %TIR   0 %Low      0%very low   Interpretations:  Glucose data was reviewed. Very good throughout    We agreed on the following plan today. Continue Metformin 1000 xr bid  Check sugars with CGM  Low carb diet and exercise Please see an eye doctor Please see a foot doctor if you have tingling or numbness       HLD On statin Check LDL   HTN Bp acceptable   Counseled on cutting down on smoking    I spent 30 minutes discussing with patient face to face and non face to face reviewing documentations, labs, and/or imaging, also discussing the management plans.   RTC in 3 months for 30 min

## 2025-05-01 ENCOUNTER — RX RENEWAL (OUTPATIENT)
Age: 60
End: 2025-05-01

## 2025-06-20 ENCOUNTER — RX RENEWAL (OUTPATIENT)
Age: 60
End: 2025-06-20

## 2025-06-24 ENCOUNTER — RX RENEWAL (OUTPATIENT)
Age: 60
End: 2025-06-24

## 2025-09-03 DIAGNOSIS — E11.65 TYPE 2 DIABETES MELLITUS WITH HYPERGLYCEMIA: ICD-10-CM

## (undated) DEVICE — VENODYNE/SCD SLEEVE CALF MEDIUM

## (undated) DEVICE — DISSECTOR ENDOSCOPIC KITTNER SINGLE TIP

## (undated) DEVICE — DRAPE GENERAL ENDOSCOPY

## (undated) DEVICE — TRAP SPECIMEN SPUTUM 40CC

## (undated) DEVICE — SUCTION YANKAUER TAPERED BULBOUS NO VENT

## (undated) DEVICE — NDL SPINAL 22G X 3.5" (BLACK)

## (undated) DEVICE — PACK GENERAL LAPAROSCOPY

## (undated) DEVICE — CHEST DRAIN PLEUR-EVAC DRY/WET ADULT-PEDS SINGLE (QUICK)

## (undated) DEVICE — SOL IRR POUR NS 0.9% 1000ML

## (undated) DEVICE — VALVE BIOPSY BRONCHOVIDEOSCOPE

## (undated) DEVICE — DRAPE IOBAN 23" X 17"

## (undated) DEVICE — APPLICATOR FOR TISSEEL GASLESS SPRAY TIP

## (undated) DEVICE — WARMING BLANKET LOWER ADULT

## (undated) DEVICE — SUT VICRYL 0 27" UR-6

## (undated) DEVICE — VALVE SUCTION EVIS 160/200/240

## (undated) DEVICE — LIGASURE MARYLAND 37CM

## (undated) DEVICE — ELCTR GROUNDING PAD ADULT COVIDIEN

## (undated) DEVICE — DRSG DERMABOND 0.7ML

## (undated) DEVICE — SUT MONOCRYL 4-0 27" PS-2 UNDYED

## (undated) DEVICE — TUBING CONNECTING 6MM 20FT

## (undated) DEVICE — APPLICATOR FOR PROGEL EXTENDED SPRAY TIP 29CM

## (undated) DEVICE — ENDOCATCH 10MM SPECIMEN POUCH

## (undated) DEVICE — SOL IRR POUR H2O 1000ML

## (undated) DEVICE — TRAY IRRIGATION SYR BULB 60CC

## (undated) DEVICE — ELCTR ROCKER SWITCH PENCIL BLUE 10FT

## (undated) DEVICE — GLV 7.5 PROTEXIS (WHITE)

## (undated) DEVICE — STAPLER COVIDIEN ENDO GIA STANDARD HANDLE